# Patient Record
Sex: FEMALE | Race: WHITE
[De-identification: names, ages, dates, MRNs, and addresses within clinical notes are randomized per-mention and may not be internally consistent; named-entity substitution may affect disease eponyms.]

---

## 2018-03-28 ENCOUNTER — HOSPITAL ENCOUNTER (INPATIENT)
Dept: HOSPITAL 74 - JER | Age: 68
LOS: 8 days | Discharge: HOME HEALTH | DRG: 690 | End: 2018-04-05
Attending: INTERNAL MEDICINE | Admitting: INTERNAL MEDICINE
Payer: COMMERCIAL

## 2018-03-28 VITALS — BODY MASS INDEX: 21.9 KG/M2

## 2018-03-28 DIAGNOSIS — G30.9: ICD-10-CM

## 2018-03-28 DIAGNOSIS — E86.0: ICD-10-CM

## 2018-03-28 DIAGNOSIS — B95.1: ICD-10-CM

## 2018-03-28 DIAGNOSIS — Z87.891: ICD-10-CM

## 2018-03-28 DIAGNOSIS — Z85.72: ICD-10-CM

## 2018-03-28 DIAGNOSIS — E78.5: ICD-10-CM

## 2018-03-28 DIAGNOSIS — N17.9: ICD-10-CM

## 2018-03-28 DIAGNOSIS — N13.5: ICD-10-CM

## 2018-03-28 DIAGNOSIS — L02.224: ICD-10-CM

## 2018-03-28 DIAGNOSIS — F32.9: ICD-10-CM

## 2018-03-28 DIAGNOSIS — N39.0: Primary | ICD-10-CM

## 2018-03-28 DIAGNOSIS — R63.4: ICD-10-CM

## 2018-03-28 DIAGNOSIS — E11.9: ICD-10-CM

## 2018-03-28 DIAGNOSIS — I25.10: ICD-10-CM

## 2018-03-28 DIAGNOSIS — F02.80: ICD-10-CM

## 2018-03-28 DIAGNOSIS — G47.00: ICD-10-CM

## 2018-03-28 DIAGNOSIS — Z95.5: ICD-10-CM

## 2018-03-28 DIAGNOSIS — Z88.0: ICD-10-CM

## 2018-03-28 DIAGNOSIS — Q62.11: ICD-10-CM

## 2018-03-28 DIAGNOSIS — I10: ICD-10-CM

## 2018-03-28 DIAGNOSIS — R21: ICD-10-CM

## 2018-03-28 LAB
ALBUMIN SERPL-MCNC: 3.6 G/DL (ref 3.4–5)
ALP SERPL-CCNC: 99 U/L (ref 45–117)
ALT SERPL-CCNC: 19 U/L (ref 12–78)
ANION GAP SERPL CALC-SCNC: 12 MMOL/L (ref 8–16)
APPEARANCE UR: (no result)
AST SERPL-CCNC: 15 U/L (ref 15–37)
BACTERIA #/AREA URNS HPF: (no result) /HPF
BASOPHILS # BLD: 0.5 % (ref 0–2)
BILIRUB SERPL-MCNC: 0.5 MG/DL (ref 0.2–1)
BILIRUB UR STRIP.AUTO-MCNC: NEGATIVE MG/DL
BUN SERPL-MCNC: 16 MG/DL (ref 7–18)
CALCIUM SERPL-MCNC: 8.8 MG/DL (ref 8.5–10.1)
CHLORIDE SERPL-SCNC: 101 MMOL/L (ref 98–107)
CO2 SERPL-SCNC: 26 MMOL/L (ref 21–32)
COLOR UR: YELLOW
CREAT SERPL-MCNC: 0.8 MG/DL (ref 0.55–1.02)
DEPRECATED RDW RBC AUTO: 15.2 % (ref 11.6–15.6)
EOSINOPHIL # BLD: 1.9 % (ref 0–4.5)
EPITH CASTS URNS QL MICRO: (no result) /HPF
GLUCOSE SERPL-MCNC: 269 MG/DL (ref 74–106)
HCT VFR BLD CALC: 34.6 % (ref 32.4–45.2)
HGB BLD-MCNC: 11.7 GM/DL (ref 10.7–15.3)
KETONES UR QL STRIP: NEGATIVE
LEUKOCYTE ESTERASE UR QL STRIP.AUTO: (no result)
LYMPHOCYTES # BLD: 27.1 % (ref 8–40)
MCH RBC QN AUTO: 27.6 PG (ref 25.7–33.7)
MCHC RBC AUTO-ENTMCNC: 33.7 G/DL (ref 32–36)
MCV RBC: 82 FL (ref 80–96)
MONOCYTES # BLD AUTO: 7.9 % (ref 3.8–10.2)
MUCOUS THREADS URNS QL MICRO: (no result)
NEUTROPHILS # BLD: 62.6 % (ref 42.8–82.8)
NITRITE UR QL STRIP: POSITIVE
PH UR: 5 [PH] (ref 5–8)
PLATELET # BLD AUTO: 163 K/MM3 (ref 134–434)
PMV BLD: 8 FL (ref 7.5–11.1)
POTASSIUM SERPLBLD-SCNC: 4 MMOL/L (ref 3.5–5.1)
PROT SERPL-MCNC: 7.3 G/DL (ref 6.4–8.2)
PROT UR QL STRIP: (no result)
PROT UR QL STRIP: NEGATIVE
RBC # BLD AUTO: 4.22 M/MM3 (ref 3.6–5.2)
RBC # UR STRIP: (no result) /UL
SODIUM SERPL-SCNC: 139 MMOL/L (ref 136–145)
SP GR UR: 1.01 (ref 1–1.03)
UROBILINOGEN UR STRIP-MCNC: NEGATIVE MG/DL (ref 0.2–1)
WBC # BLD AUTO: 5.4 K/MM3 (ref 4–10)

## 2018-03-28 RX ADMIN — DONEPEZIL HYDROCHLORIDE SCH MG: 10 TABLET, FILM COATED ORAL at 21:46

## 2018-03-28 RX ADMIN — ATORVASTATIN CALCIUM SCH MG: 10 TABLET, FILM COATED ORAL at 23:07

## 2018-03-28 RX ADMIN — SODIUM CHLORIDE SCH MLS/HR: 9 INJECTION, SOLUTION INTRAVENOUS at 21:46

## 2018-03-28 RX ADMIN — AZTREONAM SCH MLS/HR: 1 INJECTION, POWDER, LYOPHILIZED, FOR SOLUTION INTRAMUSCULAR; INTRAVENOUS at 21:47

## 2018-03-28 RX ADMIN — NYSTATIN SCH APPLIC: 100000 POWDER TOPICAL at 21:47

## 2018-03-28 RX ADMIN — INSULIN ASPART SCH: 100 INJECTION, SOLUTION INTRAVENOUS; SUBCUTANEOUS at 23:20

## 2018-03-28 NOTE — PDOC
History of Present Illness





- General


History Source: Family, Old Records


Exam Limitations: Dementia





- History of Present Illness


Initial Comments: 





03/28/18 18:06


The patient is a 68 year old female presenting with her family, with a 

significant past medical history of DM, CAD s/p cardiac stents, hyperlipidemia, 

HTN (on metoprolol), alzhimers, non-hodgkins lymphoma s/p chemo 2013, s/p stent 

in L ureter 1/18, urinary incontinence, who presents to the emergency 

department complaining of persistent hematuria despite multiple outpatient oral 

antibiotics (Cipro and Avelox). The patient was sent for admission and IV 

antibiotics. Family notes that the patient had a low grade fever at 99.9 degree 

F, dry cough, rash on her groin (evaluated by PMD and prescribed steroid cream) 

and boil on her vagina.





Allergies: Penicillin 


Past surgical history: Kidney Stent placed 


Social History: No alcohol, tobacco or drug use reported 


PMD: Dr. Ziegler.


Urologist: Dr. Cervantes 








<Brant Sena - Last Filed: 03/28/18 18:06>





<Maria Esther Lam - Last Filed: 03/28/18 19:50>





- General


Chief Complaint: Hematuria


Stated Complaint: PCP SENT/HEMATURIA HAS BLADDER STENT


Time Seen by Provider: 03/28/18 14:35





Past History





<Brant Sena - Last Filed: 03/28/18 18:06>





- Past Medical History


Cancer: Yes (NON-HODGKINS)


COPD: No


Diabetes: Yes


Hypercholesterolemia: Yes


Other medical history: Alzheimer's disease





- Surgical History


Abdominal Surgery:  (Kidney stent)





- Suicide/Smoking/Psychosocial Hx


Smoking History: Never smoked


Have you smoked in the past 12 months: No


Number of Cigarettes Smoked Daily: 0


Information on smoking cessation initiated: No


Hx Alcohol Use: No


Drug/Substance Use Hx: No


Substance Use Type: None





<Maria Esther Lam - Last Filed: 03/28/18 19:50>





- Past Medical History


Allergies/Adverse Reactions: 


 Allergies











Allergy/AdvReac Type Severity Reaction Status Date / Time


 


Penicillins Allergy   Verified 03/28/18 14:39











Home Medications: 


Ambulatory Orders





Donepezil HCl [Aricept -] 5 mg PO DAILY 09/10/14 


Clopidogrel Bisulfate [Plavix -] 75 mg PO DAILY 09/25/14 


Metformin HCl 850 mg PO DAILY 09/25/14 


Metoprolol Tartrate [Lopressor -] 25 mg PO DAILY 09/25/14 











**Review of Systems





- Review of Systems


Able to Perform ROS?: No


Comments:: 





03/28/18 18:09


ROS unavailable due to patient medical condition. 





<Brant Sena - Last Filed: 03/28/18 18:06>





*Physical Exam





- Vital Signs


 Last Vital Signs











Temp Pulse Resp BP Pulse Ox


 


 98.9 F   76   19   118/70   99 


 


 03/28/18 14:35  03/28/18 14:35  03/28/18 14:35  03/28/18 14:35  03/28/18 14:35














- Physical Exam


Comments: 





03/28/18 18:10


GENERAL: Awake, alert, and fully oriented, in no acute distress


HEAD: No signs of trauma, normocephalic, atraumatic 


EYES: PERRLA, EOMI, sclera anicteric, conjunctiva clear


ENT: Auricles normal inspection, hearing grossly normal, nares patent, 

oropharynx clear without


exudates. Moist mucosa


NECK: Normal ROM, supple, no lymphadenopathy, JVD, or masses


LUNGS: No distress, speaks full sentences, clear to auscultation bilaterally 


HEART: Regular rate and rhythm, normal S1 and S2, no murmurs, rubs or gallops, 

peripheral pulses normal and equal bilaterally. 


ABDOMEN: Soft, nontender, normoactive bowel sounds.  No guarding, no rebound.  

No masses


EXTREMITIES : Normal inspection, Normal range of motion, no edema.  No clubbing 

or cyanosis. 


NEUROLOGICAL: Cranial nerves II through XII grossly intact.  Normal speech, 

normal gait, no focal sensorimotor deficits 


SKIN: Warm, Dry, normal turgor, no rashes or lesions noted








<Brant Sena - Last Filed: 03/28/18 18:06>





- Vital Signs


 Last Vital Signs











Temp Pulse Resp BP Pulse Ox


 


 98.9 F   76   19   118/70   99 


 


 03/28/18 14:35  03/28/18 14:35  03/28/18 14:35  03/28/18 14:35  03/28/18 14:35














<Maria Esther Lam - Last Filed: 03/28/18 19:50>





**Heart Score/ECG Review





- ECG Intrepretation


Comment:: 





03/28/18 19:27


sinus at 74, L axis, nl interval, t wave inversions avl, poor r wave progression





<Maria Esther Lam - Last Filed: 03/28/18 19:50>





ED Treatment Course





- LABORATORY


CBC & Chemistry Diagram: 


 03/28/18 14:45





 03/28/18 14:45





- ADDITIONAL ORDERS


Additional order review: 


 Laboratory  Results











  03/28/18





  14:45


 


Sodium  139


 


Potassium  4.0


 


Chloride  101


 


Carbon Dioxide  26


 


Anion Gap  12


 


BUN  16


 


Creatinine  0.8


 


Creat Clearance w eGFR  > 60


 


Random Glucose  269 H


 


Calcium  8.8


 


Total Bilirubin  0.5


 


AST  15


 


ALT  19


 


Alkaline Phosphatase  99


 


Total Protein  7.3


 


Albumin  3.6








 











  03/28/18





  14:45


 


RBC  4.22


 


MCV  82.0


 


MCHC  33.7


 


RDW  15.2  D


 


MPV  8.0


 


Neutrophils %  62.6


 


Lymphocytes %  27.1  D


 


Monocytes %  7.9


 


Eosinophils %  1.9  D


 


Basophils %  0.5














<Brant Sena - Last Filed: 03/28/18 18:06>





- LABORATORY


CBC & Chemistry Diagram: 


 03/28/18 14:45





 03/28/18 14:45





- ADDITIONAL ORDERS


Additional order review: 


 Laboratory  Results











  03/28/18





  14:45


 


Sodium  139


 


Potassium  4.0


 


Chloride  101


 


Carbon Dioxide  26


 


Anion Gap  12


 


BUN  16


 


Creatinine  0.8


 


Creat Clearance w eGFR  > 60


 


Random Glucose  269 H


 


Calcium  8.8


 


Total Bilirubin  0.5


 


AST  15


 


ALT  19


 


Alkaline Phosphatase  99


 


Total Protein  7.3


 


Albumin  3.6








 











  03/28/18





  14:45


 


RBC  4.22


 


MCV  82.0


 


MCHC  33.7


 


RDW  15.2  D


 


MPV  8.0


 


Neutrophils %  62.6


 


Lymphocytes %  27.1  D


 


Monocytes %  7.9


 


Eosinophils %  1.9  D


 


Basophils %  0.5














<Maria Esther Lam - Last Filed: 03/28/18 19:50>





Medical Decision Making





- Medical Decision Making





03/28/18 17:41


a/p: 69yo pleasantly demented female presents to the ED with her family for 

eval of persistent hematuria/uti that has failed 2 outpt abx treatments


-has been seen by DR. Reuben Younger - finished avelox on friday


-also with cough since last night


-no fevers


-will check labs, ua, ucx, ekg, cxr


-sent by PMD for admission


-Dr. Baer is urology


-will discuss with ID and Urology pending labs


-has a stent to ureter


-hematuria since thursday again after stopping hematuria for a week while on 

avelox





03/28/18 19:34


pt with yeast infection to groin area


has been treating with topical nystatin


will give fluconazole





03/28/18 19:35


pt with UTI on labs


has been on outpt abx x 2


will discuss with ID and UROLOGY


will be admitted to [facility] for IV abx


case discussed with Dr. Meza who accepts pt to service


03/28/18 19:49


case discussed with Dr. Blackmon who recommends Merrem therapy IV for the UTI





<Maria Esther Lam - Last Filed: 03/28/18 19:50>





*DC/Admit/Observation/Transfer





- Attestations


Scribe Attestion: 





03/28/18 18:10


Documentation prepared by Brant Sena, acting as medical scribe for 

Maria Esther Lam DO





<Brant Sena - Last Filed: 03/28/18 18:06>





- Discharge Dispostion


Admit: Yes





- Attestations


Physician Attestion: 





03/28/18 19:28








I, Dr. Maria Esther Lam DO, attest that this document has been prepared under 

my direction and personally reviewed by me in its entirety.   I further attest, 

that it accurately reflects all work, treatment, procedures and medical decision

-making performed by me.  





<Maria Esther Lam - Last Filed: 03/28/18 19:50>


Diagnosis at time of Disposition: 


 UTI (urinary tract infection)








- Discharge Dispostion


Condition at time of disposition: Fair





- Referrals


Referrals: 


Precious Ziegler [Primary Care Provider] - 





- Patient Instructions





- Post Discharge Activity

## 2018-03-28 NOTE — PDOC
Rapid Medical Evaluation


Chief Complaint: Hematuria


Time Seen by Provider: 03/28/18 14:35


Medical Evaluation: 


 Allergies











Allergy/AdvReac Type Severity Reaction Status Date / Time


 


Penicillins Allergy   Verified 09/25/14 23:05











03/28/18 14:35


I have performed a brief in-person evaluation of this patient.





The patient presents with a chief complaint of: persistent hematuria despite 

multiple abx. Sent in for admission for IV abx.  is Dr Quick. PMD is Dr Ziegler. H/o DM, CAD, Alzheimer, [place] lymphoma, s/p chemo 2013, s/p stent in L 

ureter 1/18, urinary incontinence





Pertinent physical exam findings:stable





I have ordered the following:labs





The patient will proceed to the ED for further evaluation.

## 2018-03-28 NOTE — PN
Teaching Attending Note


Name of Resident: Twila Lay





ATTENDING PHYSICIAN STATEMENT





I saw and evaluated the patient.


I reviewed the resident's note and discussed the case with the resident.


I agree with the resident's findings and plan as documented.








SUBJECTIVE:


67 yo F with pmhx. of DM, CAD s/p stents, hld, htn (on Metoprolol), htn, 

Alzheimers, Non-Hodgkins Lymphoma (s/p chemo 13'), L. Ureteral stent 1/18, 

urinary incontinence, who presents with hematouria. Family states she was on 

Cipro/Moxi prior. Pt. is non-verbal at baseline. As per family she has a groin 

rash and boil on labia. 





OBJECTIVE:


Physical:


Vs:


 Vital Signs











 Period  Temp  Pulse  Resp  BP Sys/Silver  Pulse Ox


 


 Last 24 Hr  98.9 F  76  19  118/70  99








GEN: NAD, Resting in bed, AA0X0


HEENT: NCAT, PERRL, Throat without erythema or exudates


CARD: RRR S1, S2


RESP: CTAB


ABD: Bsx4, NTD to palpation


EXT: - C/C/E


: Groin with erythmatous rash, boil difficult to examine, due to positioning.





 CBCD











WBC  5.4 K/mm3 (4.0-10.0)  D 03/28/18  14:45    


 


RBC  4.22 M/mm3 (3.60-5.2)   03/28/18  14:45    


 


Hgb  11.7 GM/dL (10.7-15.3)   03/28/18  14:45    


 


Hct  34.6 % (32.4-45.2)   03/28/18  14:45    


 


MCV  82.0 fl (80-96)   03/28/18  14:45    


 


MCHC  33.7 g/dl (32.0-36.0)   03/28/18  14:45    


 


RDW  15.2 % (11.6-15.6)  D 03/28/18  14:45    


 


Plt Count  163 K/MM3 (134-434)   03/28/18  14:45    


 


MPV  8.0 fl (7.5-11.1)   03/28/18  14:45    








 CMP











Sodium  139 mmol/L (136-145)   03/28/18  14:45    


 


Potassium  4.0 mmol/L (3.5-5.1)   03/28/18  14:45    


 


Chloride  101 mmol/L ()   03/28/18  14:45    


 


Carbon Dioxide  26 mmol/L (21-32)   03/28/18  14:45    


 


Anion Gap  12  (8-16)   03/28/18  14:45    


 


BUN  16 mg/dL (7-18)   03/28/18  14:45    


 


Creatinine  0.8 mg/dL (0.55-1.02)   03/28/18  14:45    


 


Creat Clearance w eGFR  > 60  (>60)   03/28/18  14:45    


 


Random Glucose  269 mg/dL ()  H  03/28/18  14:45    


 


Calcium  8.8 mg/dL (8.5-10.1)   03/28/18  14:45    


 


Total Bilirubin  0.5 mg/dL (0.2-1.0)   03/28/18  14:45    


 


AST  15 U/L (15-37)   03/28/18  14:45    


 


ALT  19 U/L (12-78)   03/28/18  14:45    


 


Alkaline Phosphatase  99 U/L ()   03/28/18  14:45    


 


Total Protein  7.3 g/dl (6.4-8.2)   03/28/18  14:45    


 


Albumin  3.6 g/dl (3.4-5.0)   03/28/18  14:45    











 Urine Test Results











Urine Color  Yellow   03/28/18  18:20    


 


Urine Appearance  Turbid   03/28/18  18:20    


 


Urine pH  5.0  (5.0-8.0)   03/28/18  18:20    


 


Ur Specific Gravity  1.011  (1.001-1.035)   03/28/18  18:20    


 


Urine Protein  2+  (NEGATIVE)  H  03/28/18  18:20    


 


Urine Glucose (UA)  Negative  (NEGATIVE)   03/28/18  18:20    


 


Urine Ketones  Negative  (NEGATIVE)   03/28/18  18:20    


 


Urine Blood  3+  (NEGATIVE)  H  03/28/18  18:20    


 


Urine Nitrite  Positive  (NEGATIVE)   03/28/18  18:20    


 


Urine Bilirubin  Negative  (<2.0 mg/dL)   03/28/18  18:20    


 


Ur Leukocyte Esterase  3+  (NEGATIVE)  H  03/28/18  18:20    


 


Ur Epithelial Cells  Rare /HPF (FEW)   03/28/18  18:20    


 


Urine Bacteria  Few /hpf (NONE SEEN)   03/28/18  18:20    


 


Urine Mucus  Rare   03/28/18  18:20    





Ambulatory Orders





Donepezil HCl [Aricept -] 5 mg PO DAILY 09/10/14 


Clopidogrel Bisulfate [Plavix -] 75 mg PO DAILY 09/25/14 


Metformin HCl 850 mg PO DAILY 09/25/14 


Metoprolol Tartrate [Lopressor -] 25 mg PO DAILY 09/25/14 














ASSESSMENT AND PLAN:


67 yo F with pmhx. of DM, CAD s/p stents, hld, htn (on Metoprolol), htn, 

Alzheimers, Non-Hodgkins Lymphoma (s/p chemo 13'), L. Ureteral stent 1/18, 

urinary incontinence, who presents with hematouria, being admitted for UTI, not 

responsing to abx.





1.) UTI


- UCx


- IVF gentle


- ID consult


- Aztreonam





2.) Rash- Candida/boil


- Monitor boil- if draining or worsens gyn


- Nystatin powder





3.) L. Utretral stent


- US


- Gu Consult





3.)  DM


- FS


- RAISS


- Hold Metformin





4.) CAD s/p Stents


- Hold Plavix if procedure tomorrow





5.) Dementia


- C/W Aricept





6.) Dvt Ppx


- Scds





Place in Meds sx

## 2018-03-28 NOTE — HP
CHIEF COMPLAINT: worsening hematuria x 1 week 





PCP: Dr. Benavides





HISTORY OF PRESENT ILLNESS:





69 y/o F with PMH HTN, HLD, DM, CAD s/p stent, Alzheimer's dz, NHL s/p chemo (

2013; currently on no agents), recent sepsis 2/2 blocked L ureteral stent (

[facility]), s/p L ureteral stent placement (1/2018), urinary 

incontinence, who presents to the ED c/o worsening hematuria over the past 

week. As per daughter, in January, pt had a L ureteral stent placed. It was 

supposed to be removed in February. Ever since its placement, pt has had 

intermittent hematuria. She saw her urologist, Dr. Baer and was 

subsequently found to have a positive urine cx, and was placed on PO cipro (1 

wk course), and moxi (3 wk course) by Dr. Blackmon (ID). However, pt's hematuria 

continued, with its frequency increasing to daily this wk. Pt also with vague 

abdominal discomfort during urination during this time, as well as groin rash (

over the past two weeks), and labial boil (over the past 3 days). She has also 

had a productive cough, low grade temp (99.8F Tmax), rigors, and 15 pound 

weight loss over the last three months. Daughter denies appetite changes, night 

sweats, HA, SOB, or changes in bowel function. 





Pt is taken care of at home by family members. She does not have a health aid. 





ER course was notable for:


(1) Meropenem IVPB 1 g x 1 


(2) Diflucan 150mg x 1 


(3) UA 3+ leuk esterase, 2203 WBCs


(4) 





Recent Travel: none 





PAST MEDICAL HISTORY: as above 





PAST SURGICAL HISTORY:  s/p L ureteral stent placement (1/2018), CAD s/p stent, 

lung bx (NHL went to lung), breast bx 





Social History: retired; lives at home with family. Used to work for [employer]

, past [place] 


Smoking: started at age 13.  smoked for 40-50 yrs, 1-1.5 ppd. stopped 1 yr ago 


Alcohol: socially in past 


Drugs: denies





Family History: father- leukemia, lung CA- sister, brothers- DM, mother- stroke 





Allergies





Penicillins Allergy (Verified 03/28/18 14:39) - rash, hives 


 








HOME MEDICATIONS:


 Home Medications











 Medication  Instructions  Recorded


 


Donepezil HCl [Aricept -] 5 mg PO DAILY 09/10/14


 


Clopidogrel Bisulfate [Plavix -] 75 mg PO DAILY 09/25/14


 


Metformin HCl 850 mg PO DAILY 09/25/14


 


Metoprolol Tartrate [Lopressor -] 25 mg PO DAILY 09/25/14


 


Pravastatin Sodium 10 mg PO DAILY 03/28/18


 


Trazodone HCl DAILY 03/28/18








REVIEW OF SYSTEMS


CONSTITUTIONAL: +weight change


Absent:  fever, chills, diaphoresis, generalized weakness, malaise, loss of 

appetite


HEENT: 


Absent:  rhinorrhea, nasal congestion, throat pain, throat swelling, difficulty 

swallowing, mouth swelling, ear pain, eye pain, visual changes


CARDIOVASCULAR: 


Absent: chest pain, syncope, palpitations, irregular heart rate, lightheadedness

, peripheral edema


RESPIRATORY: 


Absent: cough, shortness of breath, dyspnea with exertion, orthopnea, wheezing, 

stridor, hemoptysis


GASTROINTESTINAL:


Absent: abdominal pain, abdominal distension, nausea, vomiting, diarrhea, 

constipation, melena, hematochezia


GENITOURINARY: +hematuria 


Absent: dysuria, frequency, urgency, hesitancy, flank pain, genital pain


MUSCULOSKELETAL: 


Absent: myalgia, arthralgia, joint swelling, back pain, neck pain


SKIN: +groin rash 


Absent: itching, pallor


HEMATOLOGIC/IMMUNOLOGIC: 


Absent: easy bleeding, easy bruising, lymphadenopathy, frequent infections


ENDOCRINE:


Absent: unexplained weight gain, unexplained weight loss, heat intolerance, 

cold intolerance


NEUROLOGIC: +dementia, urinary incontinence 


Absent: headache, focal weakness or paresthesias, dizziness, unsteady gait, 

seizure, mental status changes, bladder or bowel incontinence


PSYCHIATRIC: 


Absent: anxiety, depression, suicidal or homicidal ideation, hallucinations.








PHYSICAL EXAMINATION


 Vital Signs











  03/28/18





  14:35


 


Temperature 98.9 F


 


Pulse Rate 76


 


Respiratory 19





Rate 


 


Blood Pressure 118/70


 


O2 Sat by Pulse 99





Oximetry (%) 











GENERAL: Sitting comfortably. With dementia, mostly non-verbal. AAO x 0. Cries 

out, however in no acute distress.


HEAD: Normal with no signs of trauma.


EYES: Pupils equal, round and reactive to light, extraocular movements intact, 

sclera anicteric, conjunctiva clear. 


EARS, NOSE, THROAT: Ears normal, nares patent, oropharynx clear without 

exudates. Moist mucous membranes.


NECK: Normal range of motion, supple 


LUNGS: Breath sounds equal. No accessory muscle use. Unable to adequately 

assess as pt with poor inspiratory effort, or response to commands 


HEART: Regular rate and rhythm, normal S1 and S2 without murmur, rub or gallop.


ABDOMEN: Soft, +mild TTP suprapubic area, not distended, normoactive bowel 

sounds, no guarding, no rebound, no masses.  


LOWER EXTREMITIES: 2+ posterior tibial pulses, warm, well-perfused. No calf 

tenderness. No peripheral edema. 


NEUROLOGICAL:  Cranial nerves II-XII appear to be grossly intact. Unable to 

adequately respond to commands 


PSYCHIATRIC: with dementia 


 Laboratory Results - last 24 hr











  03/28/18 03/28/18 03/28/18





  14:45 14:45 18:20


 


WBC  5.4  D  


 


RBC  4.22  


 


Hgb  11.7  


 


Hct  34.6  


 


MCV  82.0  


 


MCH  27.6  D  


 


MCHC  33.7  


 


RDW  15.2  D  


 


Plt Count  163  


 


MPV  8.0  


 


Neutrophils %  62.6  


 


Lymphocytes %  27.1  D  


 


Monocytes %  7.9  


 


Eosinophils %  1.9  D  


 


Basophils %  0.5  


 


Sodium   139 


 


Potassium   4.0 


 


Chloride   101 


 


Carbon Dioxide   26 


 


Anion Gap   12 


 


BUN   16 


 


Creatinine   0.8 


 


Creat Clearance w eGFR   > 60 


 


Random Glucose   269 H 


 


Calcium   8.8 


 


Total Bilirubin   0.5 


 


AST   15 


 


ALT   19 


 


Alkaline Phosphatase   99 


 


Total Protein   7.3 


 


Albumin   3.6 


 


Urine Color    Yellow


 


Urine Appearance    Turbid


 


Urine pH    5.0


 


Ur Specific [place]    1.011


 


Urine Protein    2+ H


 


Urine Glucose (UA)    Negative


 


Urine Ketones    Negative


 


Urine Blood    3+ H


 


Urine Nitrite    Positive


 


Urine Bilirubin    Negative


 


Urine Urobilinogen    Negative


 


Ur Leukocyte Esterase    3+ H


 


Urine WBC (Auto)    2203


 


Urine RBC (Auto)    734


 


Ur Epithelial Cells    Rare


 


Urine Bacteria    Few


 


Urine Mucus    Rare





TESTS





MICRO: Ucx - pending





EKG: NSR, L axis deviation, TWI avL, poor R wave progression. Qtc 463ms





CXR: without infiltrates, no pleural effusion 





ASSESSMENT/PLAN:





69 y/o F with PMH HTN, HLD, DM, CAD s/p stent, Alzheimer's dz, NHL s/p chemo (

2013; currently on no agents), recent sepsis 2/2 blocked L ureteral stent (

[facility]), s/p L ureteral stent placement (1/2018), urinary 

incontinence, who presents to the ED c/o worsening hematuria over the past 

week. Pt admitted to med-surg for resistant UTI.





#Resistant UTI, failed oral abx


-Without sepsis criteria in ED, afebrile, without white count. However low 

grade temps at home


-pt immunocompromised - with NHL, DM. increased risk infection


-UA: 3+ blood, +nitrite, 3+ leuk esterase, 2203 WBCs


-Increased frequency hematuria


-Received meropenem IVPB 1g x 1 in ED


-Will start on Azactam 1g IVPB q8h tomorrow. Pen allergic-rash


-ID consult-Dr. Bates


-Urology consult- Dr. Quick


-gentle IVF


-F/u blood cx, urine cx 





#Hx L ureteral stent


-Uro consult- Dr. Quick


-F/u renal sono- assess stent placement. 


As per ED staff, pt agitated with renal sono





#Yeast infection in groin area


-Received diflucan 150mg x 1 in ED 


-Continue nystatin powder use daily





#R labial boil


-Recommend gyn f/u if does not heal or abnormal d/c





#Hx 15 lb weight loss 


-without B sx, however sig weight loss


-15 lbs over last 3 months


-Recommend outpatient f/u


-Also has smoking hx


-?r/o bladder malignancy





#CAD s/p stent


-Hold plavix 75mg PO qd in case of procedure





#HTN- controlled


-Continue metoprolol succinate 50mg qd





#HLD


-Continue pravastatin 10mg PO qHS





#DM


-will hold home metformin


-ISS ACHS


-BGM





#Alzheimer's dz


-Continue aricept 10mg PO HS





#Insomnia


-Continue ambien 5mg PO qHS PRN





#Depression/pseudodementia


-Will hold Trazodone 50 PO qd


-Qtc 463ms borderline elevated


-Continue to monitor 





#F/E/N


-gentle fluids IV NS 75 cc/hr


-Continue to monitor electrolytes


-NPO until speech&swallow- avoid aspiration d/t dementia





#PPX


DVT: SCD's, will avoid a/c d/t hematuria


GI: on protonix at home, will continue





#Dispo


Monitoring on med-surg 























Visit type





- Emergency Visit


Emergency Visit: Yes


ED Registration Date: 03/28/18


Care time: The patient presented to the Emergency Department on the above date 

and was hospitalized for further evaluation of their emergent condition.





- New Patient


This patient is new to me today: Yes


Date on this admission: 03/29/18





- Critical Care


Critical Care patient: No





Hospitalist Screening





- Colonoscopy Questionnaire


Colonoscopy Questionnaire: 





Colonoscopy Questionnaire








-   Patient:


50 - 75 years old and never had a screening colonoscopy: Unknown


History of colon or rectal polyps, or CA: Unknown


History of IBD, Crohn's disease or UC: Unknown


History of abdominal radiation therapy as a child: Unknown





-   Relative:


1 with colon or rectal CA, or polyps at age 60 or younger: Unknown


Colon or rectal CA diagnosed at age 45 or younger: Unknown


Multiple relatives with colon or rectal CA: Unknown





-   Outcome:


Screening Result: Negative Screen

## 2018-03-29 LAB
ANION GAP SERPL CALC-SCNC: 14 MMOL/L (ref 8–16)
BASOPHILS # BLD: 0.5 % (ref 0–2)
BUN SERPL-MCNC: 13 MG/DL (ref 7–18)
CALCIUM SERPL-MCNC: 8.1 MG/DL (ref 8.5–10.1)
CHLORIDE SERPL-SCNC: 99 MMOL/L (ref 98–107)
CO2 SERPL-SCNC: 25 MMOL/L (ref 21–32)
CREAT SERPL-MCNC: 0.7 MG/DL (ref 0.55–1.02)
DEPRECATED RDW RBC AUTO: 15.3 % (ref 11.6–15.6)
EOSINOPHIL # BLD: 1.2 % (ref 0–4.5)
GLUCOSE SERPL-MCNC: 144 MG/DL (ref 74–106)
HCT VFR BLD CALC: 33.2 % (ref 32.4–45.2)
HGB BLD-MCNC: 11.4 GM/DL (ref 10.7–15.3)
LYMPHOCYTES # BLD: 20.5 % (ref 8–40)
MAGNESIUM SERPL-MCNC: 1.8 MG/DL (ref 1.8–2.4)
MCH RBC QN AUTO: 27.8 PG (ref 25.7–33.7)
MCHC RBC AUTO-ENTMCNC: 34.3 G/DL (ref 32–36)
MCV RBC: 80.9 FL (ref 80–96)
MONOCYTES # BLD AUTO: 7.9 % (ref 3.8–10.2)
NEUTROPHILS # BLD: 69.9 % (ref 42.8–82.8)
PHOSPHATE SERPL-MCNC: 3 MG/DL (ref 2.5–4.9)
PLATELET # BLD AUTO: 158 K/MM3 (ref 134–434)
PMV BLD: 8.4 FL (ref 7.5–11.1)
POTASSIUM SERPLBLD-SCNC: 3.6 MMOL/L (ref 3.5–5.1)
RBC # BLD AUTO: 4.11 M/MM3 (ref 3.6–5.2)
SODIUM SERPL-SCNC: 138 MMOL/L (ref 136–145)
WBC # BLD AUTO: 7.6 K/MM3 (ref 4–10)

## 2018-03-29 RX ADMIN — AZTREONAM SCH MLS/HR: 1 INJECTION, POWDER, LYOPHILIZED, FOR SOLUTION INTRAMUSCULAR; INTRAVENOUS at 05:09

## 2018-03-29 RX ADMIN — NYSTATIN SCH APPLIC: 100000 POWDER TOPICAL at 12:38

## 2018-03-29 RX ADMIN — PANTOPRAZOLE SODIUM SCH MG: 40 INJECTION, POWDER, FOR SOLUTION INTRAVENOUS at 12:37

## 2018-03-29 RX ADMIN — CLINDAMYCIN PHOSPHATE SCH MLS/HR: 600 INJECTION, SOLUTION INTRAVENOUS at 18:28

## 2018-03-29 RX ADMIN — SODIUM CHLORIDE SCH MLS/HR: 9 INJECTION, SOLUTION INTRAVENOUS at 21:59

## 2018-03-29 RX ADMIN — ATORVASTATIN CALCIUM SCH MG: 10 TABLET, FILM COATED ORAL at 21:59

## 2018-03-29 RX ADMIN — INSULIN ASPART SCH UNITS: 100 INJECTION, SOLUTION INTRAVENOUS; SUBCUTANEOUS at 18:28

## 2018-03-29 RX ADMIN — INSULIN ASPART SCH UNITS: 100 INJECTION, SOLUTION INTRAVENOUS; SUBCUTANEOUS at 21:59

## 2018-03-29 RX ADMIN — SODIUM CHLORIDE SCH MLS/HR: 9 INJECTION, SOLUTION INTRAVENOUS at 08:41

## 2018-03-29 RX ADMIN — CLINDAMYCIN PHOSPHATE SCH MLS/HR: 600 INJECTION, SOLUTION INTRAVENOUS at 12:37

## 2018-03-29 RX ADMIN — INSULIN ASPART SCH: 100 INJECTION, SOLUTION INTRAVENOUS; SUBCUTANEOUS at 12:20

## 2018-03-29 RX ADMIN — INSULIN ASPART SCH: 100 INJECTION, SOLUTION INTRAVENOUS; SUBCUTANEOUS at 08:41

## 2018-03-29 RX ADMIN — DONEPEZIL HYDROCHLORIDE SCH: 10 TABLET, FILM COATED ORAL at 15:44

## 2018-03-29 RX ADMIN — AZTREONAM SCH: 1 INJECTION, POWDER, LYOPHILIZED, FOR SOLUTION INTRAMUSCULAR; INTRAVENOUS at 20:01

## 2018-03-29 RX ADMIN — SODIUM CHLORIDE SCH: 9 INJECTION, SOLUTION INTRAVENOUS at 21:52

## 2018-03-29 NOTE — CONSULT
Admitting History and Physical





- Primary Care Physician


PCP: Johny Sethi





- Admission


History of Present Illness: 





67 yo F with pmhx. of DM, CAD s/p stents, hld, htn (on Metoprolol), htn, 

Alzheimers, Non-Hodgkins Lymphoma (s/p chemo 13'), L. Ureteral stent 1/18, 

urinary incontinence, who presents with hematouria. Family states she was on 

Cipro/Moxi prior. Pt. is mostly non-verbal at baseline. She speaks occasionally.








Pt is on a cut up, regular diet and thin liquid without difficulty.


History Source: Family Member, Medical Record


Limitations to Obtaining History: Clinical Condition, Dementia





- Past Medical History


Cardiovascular: Yes: HTN, Hyperlipdemia


Heme/Onc: Yes: Other (Non-hodgkins lymphoma s/p chemo last 3/2014)





- Smoking History


Smoking history: Never smoked


Have you smoked in the past 12 months: No


Aproximately how many cigarettes per day: 0





- Alcohol/Substance Use


Hx Alcohol Use: No


History of Substance Use: reports: None





- Social History


ADL: Independent


History of Recent Travel: No





History





- Admission


Reason For Visit: UTI





- Diagnostics


X-ray: Report Reviewed





- General


Mental Status: Awake and Alert, Flat Affect


Attention: Distractible, Moderate Impairment


Ability to Follow Directions: Poor





Speech Evaluation





- Communication


Primary Language: Chilean


Secondary Language: ENGLISH


Communication: Yes: Simple Responses (rare verbalizations "hello")





- Speech Production


Able to Make Needs Known: Yes: Severely Impaired


Intelligibility: Yes: WNL





- Speech Characteristics


Voice Loudness: Normal


Voice Pitch: Yes: Normal


Voice Phonatory-based Quality: Yes: Normal


Speech Clarity: < 100%


Nasal Resonance: Normal


Articulation: Yes: Precise





- Language/Auditory Comprehension


Observation: Able to respond to yes/no queries: No





- Language/Verbal Expression


Able to Respond to Simple Queries: Yes: Severely Impaired


Able to Communicate Wants and Needs: Yes: Severely Impaired


Functional Communication Status: Yes: Severely Impaired





- Memory/Perception


Long term Memory: Yes: Severely Impaired


Short Term Memory: Yes: Severely Impaired





- Swallow Evaluation/Bedside Assessment


Current Nutritional Intake: NPO


Oral Secretions: Yes: WFL


Dentition: Yes: Dental Appliance Upper, Dental Appliance Lower


Facial Symmetry at Rest: Facial Droop Left


Facial Symmetry on Retraction: Symmetrical


Lingual Movement: Symmetric


Lingual Speed of Movement: Normal


Laryngeal Movement: Able to Palpate


Labial Seal: WFL


Chewing: WFL


Oral Prep Time: WFL


A-P Transit: WFL


Pocketing: None


Timing of Swallow: WFL


Coughing/Throat Clear: No


Change in Voice: No





Recommendations





- Speech Evaluation, Impression/Plan


Impression: Rare verbalizations with normal intelligibility. Profound cognitive 

deficits. Swallowing overtly intact.





- Disposition


Discharge to: Home with Assist





- Dysphagia Impressions/Plan


Swallowing Skills: WFL


*Silent aspiration: cannot be R/O at bedside





- Recommendations


Diet Consistency: Regular (cut up by family)


Liquids: Thin Liquids

## 2018-03-29 NOTE — PN
<Grabiel Sung - Last Filed: 03/29/18 16:13>


Physical Exam: 


SUBJECTIVE: Patient seen and examined


No acute events overnight. Patient nonverbal.   





OBJECTIVE:





 Vital Signs











 Period  Temp  Pulse  Resp  BP Sys/Silver  Pulse Ox


 


 Last 24 Hr  98.2 F-98.9 F  72-79  18-19  118-169/70-95  96-99











GENERAL: Sitting comfortably. Non-verbal. AAO x 0. Cries out, however in no 

acute distress.


HEAD: Normal with no signs of trauma.


EYES: Pupils equal, round and reactive to light, extraocular movements intact, 

sclera anicteric, conjunctiva clear. 


EARS, NOSE, THROAT: Oropharynx clear without exudates. Dry mucous membranes


NECK: Normal range of motion, supple 


LUNGS: Breath sounds equal. No accessory muscle use. Unable to adequately 

assess as pt with poor inspiratory effort, or response to commands 


HEART: Regular rate and rhythm, normal S1 and S2 without murmur, rub or gallop.


ABDOMEN: Soft, nontender, not distended, normoactive bowel sounds, no guarding, 

no rebound, no masses.  


LOWER EXTREMITIES: 2+ posterior tibial pulses, warm, well-perfused. No calf 

tenderness. No peripheral edema. 


NEUROLOGICAL:  Unable to adequately respond to commands 


PSYCHIATRIC: with dementia 














 Laboratory Results - last 24 hr











  03/28/18 03/28/18 03/28/18





  14:45 14:45 18:20


 


WBC  5.4  D  


 


RBC  4.22  


 


Hgb  11.7  


 


Hct  34.6  


 


MCV  82.0  


 


MCH  27.6  D  


 


MCHC  33.7  


 


RDW  15.2  D  


 


Plt Count  163  


 


MPV  8.0  


 


Neutrophils %  62.6  


 


Lymphocytes %  27.1  D  


 


Monocytes %  7.9  


 


Eosinophils %  1.9  D  


 


Basophils %  0.5  


 


Sodium   139 


 


Potassium   4.0 


 


Chloride   101 


 


Carbon Dioxide   26 


 


Anion Gap   12 


 


BUN   16 


 


Creatinine   0.8 


 


Creat Clearance w eGFR   > 60 


 


POC Glucometer   


 


Random Glucose   269 H 


 


Calcium   8.8 


 


Phosphorus   


 


Magnesium   


 


Total Bilirubin   0.5 


 


AST   15 


 


ALT   19 


 


Alkaline Phosphatase   99 


 


Total Protein   7.3 


 


Albumin   3.6 


 


Urine Color    Yellow


 


Urine Appearance    Turbid


 


Urine pH    5.0


 


Ur Specific [place]    1.011


 


Urine Protein    2+ H


 


Urine Glucose (UA)    Negative


 


Urine Ketones    Negative


 


Urine Blood    3+ H


 


Urine Nitrite    Positive


 


Urine Bilirubin    Negative


 


Urine Urobilinogen    Negative


 


Ur Leukocyte Esterase    3+ H


 


Urine WBC (Auto)    2203


 


Urine RBC (Auto)    734


 


Ur Epithelial Cells    Rare


 


Urine Bacteria    Few


 


Urine Mucus    Rare














  03/28/18 03/29/18 03/29/18





  23:15 07:50 07:50


 


WBC   7.6  D 


 


RBC   4.11 


 


Hgb   11.4 


 


Hct   33.2 


 


MCV   80.9 


 


MCH   27.8 


 


MCHC   34.3 


 


RDW   15.3 


 


Plt Count   158 


 


MPV   8.4 


 


Neutrophils %   69.9 


 


Lymphocytes %   20.5  D 


 


Monocytes %   7.9 


 


Eosinophils %   1.2 


 


Basophils %   0.5 


 


Sodium    138


 


Potassium    3.6


 


Chloride    99


 


Carbon Dioxide    25


 


Anion Gap    14


 


BUN    13


 


Creatinine    0.7


 


Creat Clearance w eGFR   


 


POC Glucometer  120.99108  


 


Random Glucose    144 H


 


Calcium    8.1 L


 


Phosphorus    3.0


 


Magnesium    1.8


 


Total Bilirubin   


 


AST   


 


ALT   


 


Alkaline Phosphatase   


 


Total Protein   


 


Albumin   


 


Urine Color   


 


Urine Appearance   


 


Urine pH   


 


Ur Specific [place]   


 


Urine Protein   


 


Urine Glucose (UA)   


 


Urine Ketones   


 


Urine Blood   


 


Urine Nitrite   


 


Urine Bilirubin   


 


Urine Urobilinogen   


 


Ur Leukocyte Esterase   


 


Urine WBC (Auto)   


 


Urine RBC (Auto)   


 


Ur Epithelial Cells   


 


Urine Bacteria   


 


Urine Mucus   














  03/29/18





  08:37


 


WBC 


 


RBC 


 


Hgb 


 


Hct 


 


MCV 


 


MCH 


 


MCHC 


 


RDW 


 


Plt Count 


 


MPV 


 


Neutrophils % 


 


Lymphocytes % 


 


Monocytes % 


 


Eosinophils % 


 


Basophils % 


 


Sodium 


 


Potassium 


 


Chloride 


 


Carbon Dioxide 


 


Anion Gap 


 


BUN 


 


Creatinine 


 


Creat Clearance w eGFR 


 


POC Glucometer  156


 


Random Glucose 


 


Calcium 


 


Phosphorus 


 


Magnesium 


 


Total Bilirubin 


 


AST 


 


ALT 


 


Alkaline Phosphatase 


 


Total Protein 


 


Albumin 


 


Urine Color 


 


Urine Appearance 


 


Urine pH 


 


Ur Specific [place] 


 


Urine Protein 


 


Urine Glucose (UA) 


 


Urine Ketones 


 


Urine Blood 


 


Urine Nitrite 


 


Urine Bilirubin 


 


Urine Urobilinogen 


 


Ur Leukocyte Esterase 


 


Urine WBC (Auto) 


 


Urine RBC (Auto) 


 


Ur Epithelial Cells 


 


Urine Bacteria 


 


Urine Mucus 








Active Medications











Generic Name Dose Route Start Last Admin





  Trade Name Nicolle  PRN Reason Stop Dose Admin


 


Atorvastatin Calcium  10 mg  03/28/18 22:00  03/28/18 23:07





  Lipitor -  PO   10 mg





  HS FESTUS   Administration


 


Donepezil HCl  10 mg  03/28/18 20:30  03/28/18 21:46





  Aricept -  PO   10 mg





  DAILY FESTUS   Administration


 


Sodium Chloride  1,000 mls @ 75 mls/hr  03/28/18 20:30  03/29/18 08:41





  Normal Saline -  IV   75 mls/hr





  ASDIR FESTUS   Administration


 


Aztreonam 1 gm/ Dextrose  50 mls @ 100 mls/hr  03/28/18 20:30  





  IVPB   





  Q8H FESTUS   





  Protocol   


 


Clindamycin Phosphate  600 mg in 50 mls @ 100 mls/hr  03/29/18 11:00  





  Cleocin 600 Mg Premix Ivpb -  IVPB   





  Q8H-IV FESTUS   


 


Insulin Aspart  1 vial  03/28/18 22:00  03/29/18 08:41





  Novolog Vial Sliding Scale -  SQ   Not Given





  ACHS FESTUS   





  Protocol   


 


Metoprolol Succinate  50 mg  03/28/18 20:45  03/28/18 21:46





  Toprol Xl -  PO   50 mg





  DAILY FESTUS   Administration


 


Nystatin  1 applic  03/28/18 20:45  03/28/18 21:47





  Nystop Powder -  TP   1 applic





  DAILY FESTUS   Administration


 


Pantoprazole Sodium  40 mg  03/29/18 10:00  





  Protonix Iv  IVPUSH   





  DAILY FESTUS   


 


Zolpidem Tartrate  5 mg  03/28/18 20:35  03/28/18 23:19





  Ambien -  PO   5 mg





  HS PRN   Administration





  INSOMNIA   











ASSESSMENT/PLAN:


68 y.o. F with pmh HTN, HLD, DM, CAD s/p stent, Alzheimer's dz, NHL s/p chemo (

2013; currently on no agents), recent sepsis 2/2 blocked L ureteral stent (

[facility]), s/p L ureteral stent placement (1/2018), urinary 

incontinence, who presented with worsening hematuria and resistant UTI





#Resistant UTI, failed oral abx with hx of l ureteral stent


-UA: 3+ blood, +nitrite, 3+ leuk esterase, 2203 WBCs


-Increased frequency hematuria


-Received meropenem IVPB 1g x 1 in ED


-Continue Azactam, started on clindamycin


-ID consult-Dr. Bates


-Urology consult- Dr. Quick


-gentle IVF


-F/u blood cx, urine cx 





#Yeast infection in groin area


-Received diflucan 150mg x 1 in ED 


-Continue nystatin powder use daily





#Hx 15 lb weight loss 


-without B sx, however sig weight loss


-15 lbs over last 3 months


-Recommend outpatient f/u





#CAD s/p stent


-Hold plavix 75mg PO qd in case of procedure





#HTN- controlled


-Continue metoprolol succinate 50mg qd





#HLD


-Continue pravastatin 10mg PO qHS





#DM


-will hold home metformin


-ISS ACHS


-BGM





#Alzheimer's dz


-Continue aricept 10mg PO HS





#Insomnia


-Continue ambien 5mg PO qHS PRN





#Depression/pseudodementia


-Will hold Trazodone 50 PO qd


-Qtc 463ms borderline elevated


-Continue to monitor 





#F/E/N


-gentle fluids IV NS 75 cc/hr


-Continue to monitor electrolytes


-Diabetic/sodium diet





#PPX


DVT: SCD's, 


GI: Protonix





#Dispo


M/S





Visit type





- Emergency Visit


Emergency Visit: Yes


ED Registration Date: 03/28/18


Care time: The patient presented to the Emergency Department on the above date 

and was hospitalized for further evaluation of their emergent condition.





- New Patient


This patient is new to me today: Yes


Date on this admission: 03/29/18





- Critical Care


Critical Care patient: No





<AlonsoludmiladavidJohny - Last Filed: 03/29/18 18:36>


Physical Exam: 


 Patient has seen and examined. Patient has 5 yr hx of Alzhemeir's Dementia. As 

per Patient's daughter, she takes care of her at home. Patient had the stent 

placement 2 months ago but continued to have infection and was placed on long 

term po antibiotics. Admitted for acute UTI on IV Azactam and clindamycin since 

patient is allergic to PCN.





 Vital Signs











Temperature  98.2 F   03/29/18 13:41


 


Pulse Rate  74   03/29/18 13:41


 


Respiratory Rate  18   03/29/18 13:41


 


Blood Pressure  149/79   03/29/18 13:41


 


O2 Sat by Pulse Oximetry (%)  96   03/29/18 14:24








 CBCD











WBC  7.6 K/mm3 (4.0-10.0)  D 03/29/18  07:50    


 


RBC  4.11 M/mm3 (3.60-5.2)   03/29/18  07:50    


 


Hgb  11.4 GM/dL (10.7-15.3)   03/29/18  07:50    


 


Hct  33.2 % (32.4-45.2)   03/29/18  07:50    


 


MCV  80.9 fl (80-96)   03/29/18  07:50    


 


MCHC  34.3 g/dl (32.0-36.0)   03/29/18  07:50    


 


RDW  15.3 % (11.6-15.6)   03/29/18  07:50    


 


Plt Count  158 K/MM3 (134-434)   03/29/18  07:50    


 


MPV  8.4 fl (7.5-11.1)   03/29/18  07:50    








 CMP











Sodium  138 mmol/L (136-145)   03/29/18  07:50    


 


Potassium  3.6 mmol/L (3.5-5.1)   03/29/18  07:50    


 


Chloride  99 mmol/L ()   03/29/18  07:50    


 


Carbon Dioxide  25 mmol/L (21-32)   03/29/18  07:50    


 


Anion Gap  14  (8-16)   03/29/18  07:50    


 


BUN  13 mg/dL (7-18)   03/29/18  07:50    


 


Creatinine  0.7 mg/dL (0.55-1.02)   03/29/18  07:50    


 


Creat Clearance w eGFR  > 60  (>60)   03/28/18  14:45    


 


Random Glucose  144 mg/dL ()  H  03/29/18  07:50    


 


Calcium  8.1 mg/dL (8.5-10.1)  L  03/29/18  07:50    


 


Total Bilirubin  0.5 mg/dL (0.2-1.0)   03/28/18  14:45    


 


AST  15 U/L (15-37)   03/28/18  14:45    


 


ALT  19 U/L (12-78)   03/28/18  14:45    


 


Alkaline Phosphatase  99 U/L ()   03/28/18  14:45    


 


Total Protein  7.3 g/dl (6.4-8.2)   03/28/18  14:45    


 


Albumin  3.6 g/dl (3.4-5.0)   03/28/18  14:45    








 Current Medications











Generic Name Dose Route Start Last Admin





  Trade Name Freq  PRN Reason Stop Dose Admin


 


Atorvastatin Calcium  10 mg  03/28/18 22:00  03/28/18 23:07





  Lipitor -  PO   10 mg





  HS FESTUS   Administration


 


Donepezil HCl  10 mg  03/28/18 20:30  03/29/18 15:44





  Aricept -  PO   Not Given





  DAILY FESTUS   


 


Sodium Chloride  1,000 mls @ 75 mls/hr  03/28/18 20:30  03/29/18 08:41





  Normal Saline -  IV   75 mls/hr





  ASDIR FESTUS   Administration


 


Aztreonam 1 gm/ Dextrose  50 mls @ 100 mls/hr  03/28/18 20:30  





  IVPB   





  Q8H FESTUS   





  Protocol   


 


Clindamycin Phosphate  600 mg in 50 mls @ 100 mls/hr  03/29/18 11:00  03/29/18 

12:37





  Cleocin 600 Mg Premix Ivpb -  IVPB   100 mls/hr





  Q8H-IV FESTUS   Administration


 


Insulin Aspart  1 vial  03/28/18 22:00  03/29/18 12:20





  Novolog Vial Sliding Scale -  SQ   Not Given





  ACHS FESTUS   





  Protocol   


 


Metoprolol Succinate  50 mg  03/28/18 20:45  03/29/18 15:45





  Toprol Xl -  PO   Not Given





  DAILY FESTUS   


 


Nystatin  1 applic  03/28/18 20:45  03/29/18 12:38





  Nystop Powder -  TP   1 applic





  DAILY FESTUS   Administration


 


Pantoprazole Sodium  40 mg  03/29/18 10:00  03/29/18 12:37





  Protonix Iv  IVPUSH   40 mg





  DAILY FESTUS   Administration


 


Zolpidem Tartrate  5 mg  03/28/18 20:35  03/28/18 23:19





  Ambien -  PO   5 mg





  HS PRN   Administration





  INSOMNIA   








 Home Medications











 Medication  Instructions  Recorded


 


Metformin HCl 1,000 mg PO BIDAC 09/25/14


 


Metoprolol Tartrate [Lopressor -] 50 mg PO DAILY 09/25/14


 


Pravastatin Sodium 10 mg PO HS 03/28/18


 


Trazodone HCl 50 mg PO HS 03/28/18


 


Amlodipine Besylate [Norvasc -] 5 mg PO DAILY 03/29/18


 


Donepezil HCl 10 mg PO HS 03/29/18


 


Insulin Glargine,Hum.rec.anlog 35 unit SQ DAILY 03/29/18





[Lantus Solostar]  


 


Pantoprazole Sodium [Protonix] 40 mg PO DAILY 03/29/18


 


Zolpidem Tartrate [Ambien] 10 mg PO HS 03/29/18

## 2018-03-29 NOTE — EKG
Test Reason : 

Blood Pressure : ***/*** mmHG

Vent. Rate : 074 BPM     Atrial Rate : 074 BPM

   P-R Int : 172 ms          QRS Dur : 088 ms

    QT Int : 418 ms       P-R-T Axes : 062 -35 089 degrees

   QTc Int : 463 ms

 

*** POOR DATA QUALITY, INTERPRETATION MAY BE ADVERSELY AFFECTED

NORMAL SINUS RHYTHM

LEFT AXIS DEVIATION

MODERATE VOLTAGE CRITERIA FOR LVH, MAY BE NORMAL VARIANT

ABNORMAL ECG

WHEN COMPARED WITH ECG OF 26-SEP-2014 06:07,

NO SIGNIFICANT CHANGE WAS FOUND

Confirmed by MAEVE LARIOS MD ([id number]) on 3/29/2018 11:38:47 AM

 

Referred By:             Confirmed By:MAEVE LARIOS MD

## 2018-03-29 NOTE — CON.ID
Consult


Consult Specialty:: Infectious Diseases


Reason for Consultation:: Persistent UTI





- History of Present Illness


Chief Complaint: Hematuria


History of Present Illness: 





Ms. Trevizo is a 67 y/o F with PMH HTN, HLD, DM, CAD s/p stent, Alzheimer's dz, 

NHL s/p chemo (2013; currently on no agents), recent sepsis 2/2 blocked L 

ureteral stent ([facility]), s/p L ureteral stent placement (1/2018), 

urinary incontinence, who presents to the ED c/o worsening hematuria over the 

past week. As per daughter, in January, pt had a L ureteral stent placed. It 

was supposed to be removed in February. Ever since its placement, pt has had 

intermittent hematuria. She saw her urologist, Dr. Baer and was 

subsequently found to have a positive urine cx with pyuria (Beta Strep)and was 

placed on PO Cipro for a week. However, she had persistence of her UTI with (+) 

c/s and a longer course of Rx with Moxifloxacin was given. I had spoken to Dr. Quick regarding postponement of stent removal in lieu of persistent UTI. A 

few days ago, her daughter, reported increased hematuria and upon speaking with 

her PMD, Dr. Aponte, the decision was made to admit her. She has been having 

intermittent fever or chills.





- History Source


History Provided By: Family Member


Limitations to Obtaining History: No Limitations





- Past Medical History


CNS: Yes: Dementia


Cardio/Vascular: Yes: HTN, Hyperlipdemia





- Alcohol/Substance Use


Hx Alcohol Use: No


History of Substance Use: reports: None





- Smoking History


Smoking history: Never smoked


Have you smoked in the past 12 months: No


Aproximately how many cigarettes per day: 0





- Social History


Usual Living Arrangement: With Child


ADL: Independent


History of Recent Travel: No





Home Medications





- Allergies


Allergies/Adverse Reactions: 


 Allergies











Allergy/AdvReac Type Severity Reaction Status Date / Time


 


Penicillins Allergy   Verified 03/28/18 14:39














- Home Medications


Home Medications: 


Ambulatory Orders





Donepezil HCl [Aricept -] 5 mg PO DAILY 09/10/14 


Clopidogrel Bisulfate [Plavix -] 75 mg PO DAILY 09/25/14 


Metformin HCl 850 mg PO DAILY 09/25/14 


Metoprolol Tartrate [Lopressor -] 25 mg PO DAILY 09/25/14 


Pravastatin Sodium 10 mg PO DAILY 03/28/18 


Trazodone HCl DAILY 03/28/18 











Review of Systems


Unable to obtain ROS, reason: Dementia





Physical Exam


Vital Signs: 


 Vital Signs











Temperature  98.2 F   03/29/18 08:55


 


Pulse Rate  74   03/29/18 08:55


 


Respiratory Rate  18   03/29/18 09:00


 


Blood Pressure  149/79   03/29/18 08:55


 


O2 Sat by Pulse Oximetry (%)  96   03/29/18 09:00











Constitutional: Yes: Well Nourished


Eyes: Yes: Conjunctiva Clear


Neck: Yes: Supple


Cardiovascular: Yes: Regular Rate and Rhythm


Respiratory: Yes: Regular


Gastrointestinal: Yes: Normal Bowel Sounds


Neurological: Yes: Alert, Confusion


Labs: 


 CBC, BMP





 03/29/18 07:50 





 03/29/18 07:50 











Imaging





- Results


Chest X-ray: Report Reviewed





Assessment/Plan





Pt with Hematuria, UTI


PCN Allergy.





Await final cultures


Allergy evaluation for PCN allergy


Continue Azacatam 2 gr q 8


Add Clinda 600 mg q 8

## 2018-03-30 LAB
ANION GAP SERPL CALC-SCNC: 13 MMOL/L (ref 8–16)
BASOPHILS # BLD: 0.6 % (ref 0–2)
BUN SERPL-MCNC: 10 MG/DL (ref 7–18)
CALCIUM SERPL-MCNC: 8.4 MG/DL (ref 8.5–10.1)
CHLORIDE SERPL-SCNC: 104 MMOL/L (ref 98–107)
CO2 SERPL-SCNC: 23 MMOL/L (ref 21–32)
CREAT SERPL-MCNC: 0.7 MG/DL (ref 0.55–1.02)
DEPRECATED RDW RBC AUTO: 15.5 % (ref 11.6–15.6)
EOSINOPHIL # BLD: 2.1 % (ref 0–4.5)
GLUCOSE SERPL-MCNC: 180 MG/DL (ref 74–106)
HCT VFR BLD CALC: 33 % (ref 32.4–45.2)
HGB BLD-MCNC: 11.4 GM/DL (ref 10.7–15.3)
LYMPHOCYTES # BLD: 23.9 % (ref 8–40)
MCH RBC QN AUTO: 27.7 PG (ref 25.7–33.7)
MCHC RBC AUTO-ENTMCNC: 34.6 G/DL (ref 32–36)
MCV RBC: 80.2 FL (ref 80–96)
MONOCYTES # BLD AUTO: 9.3 % (ref 3.8–10.2)
NEUTROPHILS # BLD: 64.1 % (ref 42.8–82.8)
PLATELET # BLD AUTO: 164 K/MM3 (ref 134–434)
PMV BLD: 8.6 FL (ref 7.5–11.1)
POTASSIUM SERPLBLD-SCNC: 3.5 MMOL/L (ref 3.5–5.1)
RBC # BLD AUTO: 4.11 M/MM3 (ref 3.6–5.2)
SODIUM SERPL-SCNC: 140 MMOL/L (ref 136–145)
WBC # BLD AUTO: 5.1 K/MM3 (ref 4–10)

## 2018-03-30 RX ADMIN — INSULIN ASPART SCH UNITS: 100 INJECTION, SOLUTION INTRAVENOUS; SUBCUTANEOUS at 12:07

## 2018-03-30 RX ADMIN — ATORVASTATIN CALCIUM SCH MG: 10 TABLET, FILM COATED ORAL at 21:19

## 2018-03-30 RX ADMIN — INSULIN ASPART SCH UNITS: 100 INJECTION, SOLUTION INTRAVENOUS; SUBCUTANEOUS at 17:28

## 2018-03-30 RX ADMIN — CLINDAMYCIN PHOSPHATE SCH MLS/HR: 600 INJECTION, SOLUTION INTRAVENOUS at 10:36

## 2018-03-30 RX ADMIN — INSULIN ASPART SCH UNITS: 100 INJECTION, SOLUTION INTRAVENOUS; SUBCUTANEOUS at 21:19

## 2018-03-30 RX ADMIN — CLINDAMYCIN PHOSPHATE SCH MLS/HR: 600 INJECTION, SOLUTION INTRAVENOUS at 01:34

## 2018-03-30 RX ADMIN — INSULIN ASPART SCH UNITS: 100 INJECTION, SOLUTION INTRAVENOUS; SUBCUTANEOUS at 06:35

## 2018-03-30 RX ADMIN — DONEPEZIL HYDROCHLORIDE SCH MG: 10 TABLET, FILM COATED ORAL at 10:35

## 2018-03-30 RX ADMIN — PANTOPRAZOLE SODIUM SCH MG: 40 INJECTION, POWDER, FOR SOLUTION INTRAVENOUS at 10:35

## 2018-03-30 RX ADMIN — GUAIFENESIN PRN ML: 100 SOLUTION ORAL at 17:21

## 2018-03-30 RX ADMIN — CLINDAMYCIN PHOSPHATE SCH MLS/HR: 600 INJECTION, SOLUTION INTRAVENOUS at 17:23

## 2018-03-30 RX ADMIN — ZOLPIDEM TARTRATE PRN MG: 5 TABLET, COATED ORAL at 01:12

## 2018-03-30 RX ADMIN — NYSTATIN SCH APPLIC: 100000 POWDER TOPICAL at 10:36

## 2018-03-30 NOTE — CON.GU
Consult


Consult Specialty:: 


Referred by:: medicine


Reason for Consultation:: left hydronephrosis





- History of Present Illness


Chief Complaint: left hydronephrosis


History of Present Illness: 





68 year old female with a history of a left UPJ obstruction and recurrent UTIs.

  She reportedly had urosepsis at [facility] last year and a ureteral stent 

was placed. Attempts at cystoscopy and removal of the stent have been thwarted 

due to presistently positive urine culture.  She is admitted to [facility] for IV abx 

as per ID





- History Source


History Provided By: Medical Record


Limitations to Obtaining History: Dementia





- Past Medical History


CNS: Yes: Dementia


Cardio/Vascular: Yes: HTN, Hyperlipdemia


Renal/: Yes: UTI, Other (UPJ obstruction)





- Alcohol/Substance Use


Hx Alcohol Use: No


History of Substance Use: reports: None





- Smoking History


Smoking history: Former smoker


Have you smoked in the past 12 months: Yes


Aproximately how many cigarettes per day: 0


If you are a former smoker, when did you quit?: 6 months ago





- Social History


Usual Living Arrangement: With Child


ADL: Independent


History of Recent Travel: No





Home Medications





- Allergies


Allergies/Adverse Reactions: 


 Allergies











Allergy/AdvReac Type Severity Reaction Status Date / Time


 


Penicillins Allergy   Verified 03/28/18 14:39














- Home Medications


Home Medications: 


Ambulatory Orders





Metformin HCl 1,000 mg PO BIDAC 09/25/14 


Metoprolol Tartrate [Lopressor -] 50 mg PO DAILY 09/25/14 


Pravastatin Sodium 10 mg PO HS 03/28/18 


Trazodone HCl 50 mg PO HS 03/28/18 


Amlodipine Besylate [Norvasc -] 5 mg PO DAILY 03/29/18 


Donepezil HCl 10 mg PO HS 03/29/18 


Insulin Glargine,Hum.rec.anlog [Lantus Solostar] 35 unit SQ DAILY 03/29/18 


Pantoprazole Sodium [Protonix] 40 mg PO DAILY 03/29/18 


Zolpidem Tartrate [Ambien] 10 mg PO HS 03/29/18 











Review of Systems


Unable to obtain ROS, reason: dementia. non verbal, alh





Physical Exam-


Vital Signs: 


 Vital Signs











Temperature  98.1 F   03/29/18 18:56


 


Pulse Rate  82   03/29/18 18:56


 


Respiratory Rate  18   03/29/18 21:00


 


Blood Pressure  163/93   03/29/18 18:56


 


O2 Sat by Pulse Oximetry (%)  96   03/29/18 21:00











Renal/: No: CVA Tenderness - Left, CVA Tenderness - Right


Labs: 


 CBC, BMP





 03/30/18 07:00 





 03/30/18 07:00 











Problem List





- Problems


(1) UPJ obstruction, congenital


Assessment/Plan: 


once patient has a negative urine culture, can proceed with elective removal of 

stent. 


Code(s): Q62.11 - CONGENITAL OCCLUSION OF URETEROPELVIC JUNCTION   





(2) UTI (urinary tract infection)


Code(s): N39.0 - URINARY TRACT INFECTION, SITE NOT SPECIFIED

## 2018-03-30 NOTE — PN
<Grabiel Sung - Last Filed: 03/30/18 13:48>


Physical Exam: 


SUBJECTIVE: Patient seen and examined


No acute events overnight. Patient sleeping comfortably this AM.





OBJECTIVE:





 Vital Signs











 Period  Temp  Pulse  Resp  BP Sys/Silver  Pulse Ox


 


 Last 24 Hr  98.1 F-98.2 F  74-82  18-18  149-163/79-93  96-96











GENERAL: Comfortable. Non-verbal. AAO x 0. Cries out, however in no acute 

distress.


HEAD: Normal with no signs of trauma.


EYES: Pupils equal, round and reactive to light, extraocular movements intact, 

sclera anicteric, conjunctiva clear. 


EARS, NOSE, THROAT: Oropharynx clear without exudates. Dry mucous membranes


NECK: Normal range of motion, supple 


LUNGS: Breath sounds equal. No accessory muscle use. Unable to adequately 

assess as pt with poor inspiratory effort, or response to commands 


HEART: Regular rate and rhythm, normal S1 and S2 without murmur, rub or gallop.


ABDOMEN: Soft, nontender, not distended, normoactive bowel sounds, no guarding, 

no rebound, no masses.  


LOWER EXTREMITIES: 2+ posterior tibial pulses, warm, well-perfused. No calf 

tenderness. No peripheral edema. 


NEUROLOGICAL:  Unable to adequately respond to commands 


PSYCHIATRIC: with dementia 

















 Laboratory Results - last 24 hr











  03/29/18 03/29/18 03/29/18





  11:29 17:55 21:48


 


WBC   


 


RBC   


 


Hgb   


 


Hct   


 


MCV   


 


MCH   


 


MCHC   


 


RDW   


 


Plt Count   


 


MPV   


 


Neutrophils %   


 


Lymphocytes %   


 


Monocytes %   


 


Eosinophils %   


 


Basophils %   


 


Sodium   


 


Potassium   


 


Chloride   


 


Carbon Dioxide   


 


Anion Gap   


 


BUN   


 


Creatinine   


 


POC Glucometer  179  229  285


 


Random Glucose   


 


Calcium   














  03/30/18 03/30/18 03/30/18





  06:05 07:00 07:00


 


WBC   5.1  D 


 


RBC   4.11 


 


Hgb   11.4 


 


Hct   33.0 


 


MCV   80.2 


 


MCH   27.7 


 


MCHC   34.6 


 


RDW   15.5 


 


Plt Count   164 


 


MPV   8.6 


 


Neutrophils %   64.1 


 


Lymphocytes %   23.9 


 


Monocytes %   9.3 


 


Eosinophils %   2.1 


 


Basophils %   0.6 


 


Sodium    140


 


Potassium    3.5


 


Chloride    104


 


Carbon Dioxide    23


 


Anion Gap    13


 


BUN    10


 


Creatinine    0.7


 


POC Glucometer  191  


 


Random Glucose    180 H


 


Calcium    8.4 L








Active Medications











Generic Name Dose Route Start Last Admin





  Trade Name Freq  PRN Reason Stop Dose Admin


 


Atorvastatin Calcium  10 mg  03/28/18 22:00  03/29/18 21:59





  Lipitor -  PO   10 mg





  HS FESTUS   Administration


 


Donepezil HCl  10 mg  03/28/18 20:30  03/30/18 10:35





  Aricept -  PO   10 mg





  DAILY FESTUS   Administration


 


Sodium Chloride  1,000 mls @ 75 mls/hr  03/28/18 20:30  03/29/18 21:59





  Normal Saline -  IV   75 mls/hr





  ASDIR FESTUS   Administration


 


Clindamycin Phosphate  600 mg in 50 mls @ 100 mls/hr  03/29/18 11:00  03/30/18 

10:36





  Cleocin 600 Mg Premix Ivpb -  IVPB   100 mls/hr





  Q8H-IV FESTUS   Administration


 


Insulin Aspart  1 vial  03/28/18 22:00  03/30/18 06:35





  Novolog Vial Sliding Scale -  SQ   2 units





  ACHS FESTUS   Administration





  Protocol   


 


Metoprolol Succinate  50 mg  03/28/18 20:45  03/30/18 10:35





  Toprol Xl -  PO   50 mg





  DAILY FESTUS   Administration


 


Nystatin  1 applic  03/28/18 20:45  03/30/18 10:36





  Nystop Powder -  TP   1 applic





  DAILY FESTUS   Administration


 


Pantoprazole Sodium  40 mg  03/29/18 10:00  03/30/18 10:35





  Protonix Iv  IVPUSH   40 mg





  DAILY FESTUS   Administration


 


Zolpidem Tartrate  5 mg  03/29/18 20:09  03/30/18 01:12





  Ambien -  PO   5 mg





  HS PRN   Administration





  INSOMNIA   











ASSESSMENT/PLAN:


68 y.o. F with pmh HTN, HLD, DM, CAD s/p stent, Alzheimer's dz, NHL s/p chemo (

2013; currently on no agents), recent sepsis 2/2 blocked L ureteral stent (

[facility]), s/p L ureteral stent placement (1/2018), urinary 

incontinence, who presented with worsening hematuria and resistant UTI





#Resistant UTI, failed oral abx with hx of l ureteral stent


-UA: 3+ blood, +nitrite, 3+ leuk esterase, 2203 WBCs


-Increased frequency hematuria


-Received meropenem IVPB 1g x 1 in ED


-Continue Azactam and clindamycin, Patient has penicillin allergy


-ID consult-Dr. Bates


-Urology consult- Dr. Quick, will proceed with elective removal of stent 

after culture is negative


-gentle IVF @ 75 cc/hr


-F/u blood cx, urine cx 





#Yeast infection in groin area


-Received diflucan 150mg x 1 in ED 


-Continue nystatin powder use daily





#Hx 15 lb weight loss 


-without B sx, however sig weight loss


-15 lbs over last 3 months


-Recommend outpatient f/u





#CAD s/p stent


-Hold plavix 75mg PO qd in case of procedure





#HTN- controlled


-Continue metoprolol succinate 50mg qd





#HLD


-Continue pravastatin 10mg PO qHS





#DM


-will hold home metformin


-ISS ACHS


-BGM ACHS





#Alzheimer's dz


-Continue aricept 10mg PO HS





#Insomnia


-Continue ambien 5mg PO qHS PRN





#Depression/pseudodementia


-Will hold Trazodone 50 PO qd


-Qtc 463ms borderline elevated


-Continue to monitor 





#F/E/N


-gentle fluids IV NS 75 cc/hr


-Continue to monitor electrolytes


-Diabetic/sodium diet





#PPX


DVT: SCD's


GI: Protonix





#Dispo


M/S





Visit type





- Emergency Visit


Emergency Visit: Yes


ED Registration Date: 03/28/18


Care time: The patient presented to the Emergency Department on the above date 

and was hospitalized for further evaluation of their emergent condition.





- New Patient


This patient is new to me today: No





- Critical Care


Critical Care patient: No





<Johny Sethi - Last Filed: 03/30/18 18:51>


Physical Exam: 


Patient has no  fever or chills, daughter at bedside. Continue IV antibiotic 

for now. Urology on Board and ID on board.

## 2018-03-31 LAB
ANION GAP SERPL CALC-SCNC: 15 MMOL/L (ref 8–16)
BASOPHILS # BLD: 0.5 % (ref 0–2)
BUN SERPL-MCNC: 12 MG/DL (ref 7–18)
CALCIUM SERPL-MCNC: 8.3 MG/DL (ref 8.5–10.1)
CHLORIDE SERPL-SCNC: 103 MMOL/L (ref 98–107)
CO2 SERPL-SCNC: 21 MMOL/L (ref 21–32)
CREAT SERPL-MCNC: 0.9 MG/DL (ref 0.55–1.02)
DEPRECATED RDW RBC AUTO: 15.4 % (ref 11.6–15.6)
EOSINOPHIL # BLD: 3.3 % (ref 0–4.5)
GLUCOSE SERPL-MCNC: 209 MG/DL (ref 74–106)
HCT VFR BLD CALC: 33.4 % (ref 32.4–45.2)
HGB BLD-MCNC: 11.4 GM/DL (ref 10.7–15.3)
LYMPHOCYTES # BLD: 21.3 % (ref 8–40)
MCH RBC QN AUTO: 27.4 PG (ref 25.7–33.7)
MCHC RBC AUTO-ENTMCNC: 34 G/DL (ref 32–36)
MCV RBC: 80.5 FL (ref 80–96)
MONOCYTES # BLD AUTO: 8.5 % (ref 3.8–10.2)
NEUTROPHILS # BLD: 66.4 % (ref 42.8–82.8)
PLATELET # BLD AUTO: 154 K/MM3 (ref 134–434)
PMV BLD: 7.9 FL (ref 7.5–11.1)
POTASSIUM SERPLBLD-SCNC: 3.4 MMOL/L (ref 3.5–5.1)
RBC # BLD AUTO: 4.15 M/MM3 (ref 3.6–5.2)
SODIUM SERPL-SCNC: 139 MMOL/L (ref 136–145)
WBC # BLD AUTO: 5.4 K/MM3 (ref 4–10)

## 2018-03-31 RX ADMIN — NYSTATIN SCH APPLIC: 100000 POWDER TOPICAL at 11:06

## 2018-03-31 RX ADMIN — CLINDAMYCIN PHOSPHATE SCH MLS/HR: 600 INJECTION, SOLUTION INTRAVENOUS at 18:16

## 2018-03-31 RX ADMIN — INSULIN ASPART SCH UNITS: 100 INJECTION, SOLUTION INTRAVENOUS; SUBCUTANEOUS at 18:16

## 2018-03-31 RX ADMIN — DONEPEZIL HYDROCHLORIDE SCH MG: 10 TABLET, FILM COATED ORAL at 11:00

## 2018-03-31 RX ADMIN — CLINDAMYCIN PHOSPHATE SCH MLS/HR: 600 INJECTION, SOLUTION INTRAVENOUS at 01:35

## 2018-03-31 RX ADMIN — ZOLPIDEM TARTRATE PRN MG: 5 TABLET, COATED ORAL at 00:11

## 2018-03-31 RX ADMIN — ATORVASTATIN CALCIUM SCH MG: 10 TABLET, FILM COATED ORAL at 21:34

## 2018-03-31 RX ADMIN — INSULIN ASPART SCH UNITS: 100 INJECTION, SOLUTION INTRAVENOUS; SUBCUTANEOUS at 12:21

## 2018-03-31 RX ADMIN — GUAIFENESIN PRN ML: 100 SOLUTION ORAL at 16:11

## 2018-03-31 RX ADMIN — ZOLPIDEM TARTRATE PRN MG: 5 TABLET, COATED ORAL at 23:18

## 2018-03-31 RX ADMIN — INSULIN ASPART SCH UNITS: 100 INJECTION, SOLUTION INTRAVENOUS; SUBCUTANEOUS at 06:41

## 2018-03-31 RX ADMIN — INSULIN ASPART SCH UNITS: 100 INJECTION, SOLUTION INTRAVENOUS; SUBCUTANEOUS at 21:34

## 2018-03-31 RX ADMIN — PANTOPRAZOLE SODIUM SCH MG: 40 INJECTION, POWDER, FOR SOLUTION INTRAVENOUS at 11:10

## 2018-03-31 RX ADMIN — CLINDAMYCIN PHOSPHATE SCH MLS/HR: 600 INJECTION, SOLUTION INTRAVENOUS at 11:01

## 2018-03-31 NOTE — PN
Physical Exam: 


SUBJECTIVE: Patient seen and examined


Patient is comfortable with no acute distress. as per daughter patient is on 

Ambien 10mg po qhs. 





OBJECTIVE:








 Vital Signs











Temperature  97.7 F   03/31/18 18:23


 


Pulse Rate  71   03/31/18 18:23


 


Respiratory Rate  20   03/31/18 18:23


 


Blood Pressure  158/87   03/31/18 18:23


 


O2 Sat by Pulse Oximetry (%)  98   03/30/18 21:00








 





GENERAL: The patient is awake, but does not communicate just stares at you.


HEAD: Normal with no signs of trauma.


EYES: PERRL, extraocular movements intact, sclera anicteric, conjunctiva clear. 

No ptosis. 


ENT: Ears normal, nares patent, oropharynx clear without exudates, moist mucous 

membranes.


NECK: Trachea midline, full range of motion, supple. 


LUNGS: Breath sounds equal, clear to auscultation bilaterally, no wheezes, no 

crackles, no accessory muscle use. 


HEART: Regular rate and rhythm, S1, S2 without murmur, rub or gallop.


ABDOMEN: Soft, nontender, nondistended, normoactive bowel sounds, no guarding, 

no rebound, no hepatosplenomegaly, no masses.


EXTREMITIES: 2+ pulses, warm, well-perfused, no edema. 


NEUROLOGICAL: Cranial nerves II through XII grossly intact.  gait is stable 

with the help of the daughter .


SKIN: Warm, dry, normal turgor, no rashes or lesions noted





 CBCD











WBC  5.4 K/mm3 (4.0-10.0)   03/31/18  07:00    


 


RBC  4.15 M/mm3 (3.60-5.2)   03/31/18  07:00    


 


Hgb  11.4 GM/dL (10.7-15.3)   03/31/18  07:00    


 


Hct  33.4 % (32.4-45.2)   03/31/18  07:00    


 


MCV  80.5 fl (80-96)   03/31/18  07:00    


 


MCHC  34.0 g/dl (32.0-36.0)   03/31/18  07:00    


 


RDW  15.4 % (11.6-15.6)   03/31/18  07:00    


 


Plt Count  154 K/MM3 (134-434)   03/31/18  07:00    


 


MPV  7.9 fl (7.5-11.1)   03/31/18  07:00    








 CMP











Sodium  139 mmol/L (136-145)   03/31/18  07:00    


 


Potassium  3.4 mmol/L (3.5-5.1)  L  03/31/18  07:00    


 


Chloride  103 mmol/L ()   03/31/18  07:00    


 


Carbon Dioxide  21 mmol/L (21-32)   03/31/18  07:00    


 


Anion Gap  15  (8-16)   03/31/18  07:00    


 


BUN  12 mg/dL (7-18)   03/31/18  07:00    


 


Creatinine  0.9 mg/dL (0.55-1.02)   03/31/18  07:00    


 


Creat Clearance w eGFR  > 60  (>60)   03/28/18  14:45    


 


Random Glucose  209 mg/dL ()  H  03/31/18  07:00    


 


Calcium  8.3 mg/dL (8.5-10.1)  L  03/31/18  07:00    


 


Total Bilirubin  0.5 mg/dL (0.2-1.0)   03/28/18  14:45    


 


AST  15 U/L (15-37)   03/28/18  14:45    


 


ALT  19 U/L (12-78)   03/28/18  14:45    


 


Alkaline Phosphatase  99 U/L ()   03/28/18  14:45    


 


Total Protein  7.3 g/dl (6.4-8.2)   03/28/18  14:45    


 


Albumin  3.6 g/dl (3.4-5.0)   03/28/18  14:45    








 Current Medications











Generic Name Dose Route Start Last Admin





  Trade Name Freq  PRN Reason Stop Dose Admin


 


Atorvastatin Calcium  10 mg  03/28/18 22:00  03/30/18 21:19





  Lipitor -  PO   10 mg





  HS FESTUS   Administration


 


Donepezil HCl  10 mg  03/28/18 20:30  03/31/18 11:00





  Aricept -  PO   10 mg





  DAILY FESTUS   Administration


 


Guaifenesin  10 ml  03/30/18 12:15  03/31/18 16:11





  Robitussin -  PO   10 ml





  Q8H PRN   Administration





  COUGH   


 


Clindamycin Phosphate  600 mg in 50 mls @ 100 mls/hr  03/29/18 11:00  03/31/18 

18:16





  Cleocin 600 Mg Premix Ivpb -  IVPB   100 mls/hr





  Q8H-IV FESTUS   Administration


 


Insulin Aspart  1 vial  03/28/18 22:00  03/31/18 18:16





  Novolog Vial Sliding Scale -  SQ   8 units





  ACHS FESTUS   Administration





  Protocol   


 


Metoprolol Succinate  50 mg  03/28/18 20:45  03/31/18 11:00





  Toprol Xl -  PO   50 mg





  DAILY FESTUS   Administration


 


Nystatin  1 applic  03/28/18 20:45  03/31/18 11:06





  Nystop Powder -  TP   1 applic





  DAILY FESTUS   Administration


 


Pantoprazole Sodium  40 mg  03/29/18 10:00  03/31/18 11:10





  Protonix Iv  IVPUSH   40 mg





  DAILY FESTUS   Administration


 


Zolpidem Tartrate  10 mg  03/31/18 19:27  





  Ambien -  PO   





  HS PRN   





  INSOMNIA   








 Home Medications











 Medication  Instructions  Recorded


 


Metformin HCl 1,000 mg PO BIDAC 09/25/14


 


Metoprolol Tartrate [Lopressor -] 50 mg PO DAILY 09/25/14


 


Pravastatin Sodium 10 mg PO HS 03/28/18


 


Trazodone HCl 50 mg PO HS 03/28/18


 


Amlodipine Besylate [Norvasc -] 5 mg PO DAILY 03/29/18


 


Donepezil HCl 10 mg PO HS 03/29/18


 


Insulin Glargine,Hum.rec.anlog 35 unit SQ DAILY 03/29/18





[Lantus Solostar]  


 


Pantoprazole Sodium [Protonix] 40 mg PO DAILY 03/29/18


 


Zolpidem Tartrate [Ambien] 10 mg PO HS 03/29/18








ASSESSMENT/PLAN:


68 y.o. F with pmh HTN, HLD, DM, CAD s/p stent, Alzheimer's dz, NHL s/p chemo (

2013; currently on no agents), recent sepsis 2/2 blocked L ureteral stent (

[facility]), s/p L ureteral stent placement (1/2018), urinary 

incontinence, who presented with worsening hematuria and resistant UTI





#Resistant UTI, failed oral abx with hx of l ureteral stent, on IV antibiotic 

continue as per ID.


-Urology consult- Dr. Quick, will proceed with elective removal of stent 

after culture is negative





#Yeast infection in groin area s/p Received diflucan 150mg x 1 in ED, on 

nystatin powder use daily





#CAD s/p stent , plavix is on hold, once the culture is negative, uretral stent 

will be removed . 





#HTN- controlled Continue metoprolol succinate 50mg qd





#HLD Continue pravastatin 10mg PO qHS





#DM will hold home metformin; ISS ACHS; BGM ACHS





#Alzheimer's dz Continue aricept 10mg PO HS





#Insomnia Continue ambien 5mg PO qHS PRN





#Depression/pseudodementia Will hold Trazodone 50 PO qd, Qt 463ms borderline 

elevated , Continue to monitor 





#PPX


DVT: SCD's


GI: Protonix











 





Visit type





- Emergency Visit


Emergency Visit: Yes


ED Registration Date: 03/28/18


Care time: The patient presented to the Emergency Department on the above date 

and was hospitalized for further evaluation of their emergent condition.





- New Patient


This patient is new to me today: No





- Critical Care


Critical Care patient: No





- Discharge Referral


Referred to [facility] No

## 2018-03-31 NOTE — PN
Progress Note, Physician


Chief Complaint: 





Fever


History of Present Illness: 





Ms. Trevizo is a 69 y/o F with PMH HTN, HLD, DM, CAD s/p stent, Alzheimer's dz, 

NHL s/p chemo (2013; currently on no agents), recent sepsis 2/2 blocked L 

ureteral stent ([facility]), s/p L ureteral stent placement (1/2018), 

urinary incontinence, who presents to the ED c/o worsening hematuria over the 

past week. As per daughter, in January, pt had a L ureteral stent placed. It 

was supposed to be removed in February. Ever since its placement, pt has had 

intermittent hematuria. She saw her urologist, Dr. Baer and was 

subsequently found to have a positive urine cx with pyuria (Beta Strep)and was 

placed on PO Cipro for a week. However, she had persistence of her UTI with (+) 

c/s and a longer course of Rx with Moxifloxacin was given. Hospital course 

significant for (+) Strep in the urine ( same organism isolated as outpatient )





- Current Medication List


Current Medications: 


Active Medications





Atorvastatin Calcium (Lipitor -)  10 mg PO HS [facility]


   Last Admin: 03/30/18 21:19 Dose:  10 mg


Donepezil HCl (Aricept -)  10 mg PO DAILY FESTUS


   Last Admin: 03/31/18 11:00 Dose:  10 mg


Guaifenesin (Robitussin -)  10 ml PO Q8H PRN


   PRN Reason: COUGH


   Last Admin: 03/30/18 17:21 Dose:  10 ml


Clindamycin Phosphate (Cleocin 600 Mg Premix Ivpb -)  600 mg in 50 mls @ 100 mls

/hr IVPB Q8H-IV FESTUS


   Last Admin: 03/31/18 11:01 Dose:  100 mls/hr


Insulin Aspart (Novolog Vial Sliding Scale -)  1 vial SQ ACHS FESTUS


   PRN Reason: Protocol


   Last Admin: 03/31/18 06:41 Dose:  4 units


Metoprolol Succinate (Toprol Xl -)  50 mg PO DAILY [facility]


   Last Admin: 03/31/18 11:00 Dose:  50 mg


Nystatin (Nystop Powder -)  1 applic TP DAILY [facility]


   Last Admin: 03/31/18 11:06 Dose:  1 applic


Pantoprazole Sodium (Protonix Iv)  40 mg IVPUSH DAILY [facility]


   Last Admin: 03/31/18 11:10 Dose:  40 mg


Zolpidem Tartrate (Ambien -)  5 mg PO HS PRN


   PRN Reason: INSOMNIA


   Last Admin: 03/31/18 00:11 Dose:  5 mg











- Objective


Vital Signs: 


 Vital Signs











Temperature  98.7 F   03/31/18 09:45


 


Pulse Rate  66   03/31/18 09:45


 


Respiratory Rate  18   03/31/18 09:45


 


Blood Pressure  134/75   03/31/18 09:45


 


O2 Sat by Pulse Oximetry (%)  98   03/30/18 21:00











Constitutional: Yes: Well Nourished, Calm


Cardiovascular: Yes: Regular Rate and Rhythm


Respiratory: Yes: CTA Bilaterally


Gastrointestinal: Yes: Normal Bowel Sounds, Soft


Labs: 


 CBC, BMP





 03/31/18 07:00 





 03/31/18 07:00 











Assessment/Plan





Pt with Hematuria, UTI


PCN Allergy.





Continue Clinda 


DC Azactam


Repeat UA and c/s tomorrow

## 2018-04-01 RX ADMIN — ATORVASTATIN CALCIUM SCH MG: 10 TABLET, FILM COATED ORAL at 22:02

## 2018-04-01 RX ADMIN — GUAIFENESIN PRN ML: 100 SOLUTION ORAL at 12:41

## 2018-04-01 RX ADMIN — INSULIN ASPART SCH UNITS: 100 INJECTION, SOLUTION INTRAVENOUS; SUBCUTANEOUS at 17:24

## 2018-04-01 RX ADMIN — INSULIN ASPART SCH UNITS: 100 INJECTION, SOLUTION INTRAVENOUS; SUBCUTANEOUS at 12:41

## 2018-04-01 RX ADMIN — INSULIN ASPART SCH UNITS: 100 INJECTION, SOLUTION INTRAVENOUS; SUBCUTANEOUS at 22:02

## 2018-04-01 RX ADMIN — DONEPEZIL HYDROCHLORIDE SCH MG: 10 TABLET, FILM COATED ORAL at 11:11

## 2018-04-01 RX ADMIN — INSULIN ASPART SCH UNITS: 100 INJECTION, SOLUTION INTRAVENOUS; SUBCUTANEOUS at 06:29

## 2018-04-01 RX ADMIN — CLINDAMYCIN PHOSPHATE SCH MLS/HR: 600 INJECTION, SOLUTION INTRAVENOUS at 11:11

## 2018-04-01 RX ADMIN — CLINDAMYCIN PHOSPHATE SCH MLS/HR: 600 INJECTION, SOLUTION INTRAVENOUS at 19:05

## 2018-04-01 RX ADMIN — PANTOPRAZOLE SODIUM SCH MG: 40 INJECTION, POWDER, FOR SOLUTION INTRAVENOUS at 11:12

## 2018-04-01 RX ADMIN — ZOLPIDEM TARTRATE PRN MG: 5 TABLET, COATED ORAL at 23:14

## 2018-04-01 RX ADMIN — NYSTATIN SCH APPLIC: 100000 POWDER TOPICAL at 11:12

## 2018-04-01 RX ADMIN — CLINDAMYCIN PHOSPHATE SCH MLS/HR: 600 INJECTION, SOLUTION INTRAVENOUS at 01:51

## 2018-04-01 NOTE — PN
<Jeanie Weeks - Last Filed: 04/01/18 17:34>


Physical Exam: 


SUBJECTIVE: Patient seen and examined, family at bedside. No complaints; 








OBJECTIVE:





 Vital Signs











 Period  Temp  Pulse  Resp  BP Sys/Silver  Pulse Ox


 


 Last 24 Hr  97.4 F-98.0 F  66-73  14-20  137-184/70-95  93-95











GENERAL: baseline dementia; did not speak today;  


LUNGS: Breath sounds equal, clear to auscultation bilaterally, no wheezes, no 

crackles, no 


accessory muscle use. 


HEART: Regular rate and rhythm, S1, S2 without murmur, rub or gallop.


ABDOMEN: Soft, nontender, nondistended, normoactive bowel sounds, no guarding, 

no 


rebound, no hepatosplenomegaly, no masses.


EXTREMITIES: 2+ pulses, warm, well-perfused, no edema. 











 Laboratory Results - last 24 hr











  03/31/18 03/31/18 04/01/18





  17:25 21:31 06:26


 


POC Glucometer  328  282  229














  04/01/18





  11:20


 


POC Glucometer  358








Active Medications











Generic Name Dose Route Start Last Admin





  Trade Name Freq  PRN Reason Stop Dose Admin


 


Atorvastatin Calcium  10 mg  03/28/18 22:00  03/31/18 21:34





  Lipitor -  PO   10 mg





  HS FESTUS   Administration


 


Donepezil HCl  10 mg  03/28/18 20:30  04/01/18 11:11





  Aricept -  PO   10 mg





  DAILY FESTUS   Administration


 


Guaifenesin  10 ml  03/30/18 12:15  04/01/18 12:41





  Robitussin -  PO   10 ml





  Q8H PRN   Administration





  COUGH   


 


Clindamycin Phosphate  600 mg in 50 mls @ 100 mls/hr  03/29/18 11:00  04/01/18 

11:11





  Cleocin 600 Mg Premix Ivpb -  IVPB   100 mls/hr





  Q8H-IV FESTUS   Administration


 


Insulin Aspart  1 vial  03/28/18 22:00  04/01/18 17:24





  Novolog Vial Sliding Scale -  SQ   8 units





  ACHS FESTUS   Administration





  Protocol   


 


Metoprolol Succinate  50 mg  03/28/18 20:45  04/01/18 11:12





  Toprol Xl -  PO   50 mg





  DAILY FESTUS   Administration


 


Nystatin  1 applic  03/28/18 20:45  04/01/18 11:12





  Nystop Powder -  TP   1 applic





  DAILY FESTUS   Administration


 


Pantoprazole Sodium  40 mg  03/29/18 10:00  04/01/18 11:12





  Protonix Iv  IVPUSH   40 mg





  DAILY FESTUS   Administration


 


Zolpidem Tartrate  5 mg  03/31/18 19:27  03/31/18 23:18





  Ambien -  PO   5 mg





  HS PRN   Administration





  INSOMNIA   











ASSESSMENT/PLAN:


This is a 68 year old female with a medical history alzheimer's, NHL s/p chemo, 

DM, HTN, HLD, CAD s/p stents, hx recurrent UTIs s/p renal stent, who presents 

with repeat urinary tract infection and hematuria. 








#complicated resistant UTI with hematuria:


-currently on IV clindamycin 


-urine cx + for strep ag.


-will repeat ua and urine culture; if negative plan for stent removal


-appreciate ID and urology








#yeast infection groin


- nystatin powder qd


#Hx 15 lb weight loss 


-without B sx, however sig weight loss


-15 lbs over last 3 months


-Recommend outpatient f/u





#CAD s/p stent


-plavix currently held for potential procedure





#HTN- controlled


-metoprolol succinate 50mg qd





#HLD


-pravastatin 10mg PO qHS





#DM


-bgm ACHS; insulin ss





#Alzheimer's dz


-aricept 10mg PO HS








#Depression/pseudodementia


-hold Trazodone 50 PO qd due to qtc borderline








Disposition: pending urine culture and stent removal














Visit type





- Emergency Visit


Emergency Visit: Yes


ED Registration Date: 03/28/18


Care time: The patient presented to the Emergency Department on the above date 

and was hospitalized for further evaluation of their emergent condition.





- New Patient


This patient is new to me today: Yes


Date on this admission: 04/01/18





- Critical Care


Critical Care patient: No





<Johny Sethi - Last Filed: 04/01/18 19:33>


Physical Exam: 


Agree with the above.

## 2018-04-02 LAB
ANION GAP SERPL CALC-SCNC: 9 MMOL/L (ref 8–16)
BASOPHILS # BLD: 0.5 % (ref 0–2)
BUN SERPL-MCNC: 16 MG/DL (ref 7–18)
CALCIUM SERPL-MCNC: 8.6 MG/DL (ref 8.5–10.1)
CHLORIDE SERPL-SCNC: 104 MMOL/L (ref 98–107)
CO2 SERPL-SCNC: 25 MMOL/L (ref 21–32)
CREAT SERPL-MCNC: 0.8 MG/DL (ref 0.55–1.02)
DEPRECATED RDW RBC AUTO: 15.3 % (ref 11.6–15.6)
EOSINOPHIL # BLD: 2.8 % (ref 0–4.5)
GLUCOSE SERPL-MCNC: 273 MG/DL (ref 74–106)
HCT VFR BLD CALC: 32 % (ref 32.4–45.2)
HGB BLD-MCNC: 11 GM/DL (ref 10.7–15.3)
LYMPHOCYTES # BLD: 31.8 % (ref 8–40)
MCH RBC QN AUTO: 27.5 PG (ref 25.7–33.7)
MCHC RBC AUTO-ENTMCNC: 34.3 G/DL (ref 32–36)
MCV RBC: 80.1 FL (ref 80–96)
MONOCYTES # BLD AUTO: 7.9 % (ref 3.8–10.2)
NEUTROPHILS # BLD: 57 % (ref 42.8–82.8)
PLATELET # BLD AUTO: 188 K/MM3 (ref 134–434)
PMV BLD: 7.7 FL (ref 7.5–11.1)
POTASSIUM SERPLBLD-SCNC: 3.6 MMOL/L (ref 3.5–5.1)
RBC # BLD AUTO: 3.99 M/MM3 (ref 3.6–5.2)
SODIUM SERPL-SCNC: 138 MMOL/L (ref 136–145)
WBC # BLD AUTO: 5.3 K/MM3 (ref 4–10)

## 2018-04-02 RX ADMIN — CLINDAMYCIN PHOSPHATE SCH MLS/HR: 600 INJECTION, SOLUTION INTRAVENOUS at 01:57

## 2018-04-02 RX ADMIN — INSULIN ASPART SCH UNITS: 100 INJECTION, SOLUTION INTRAVENOUS; SUBCUTANEOUS at 12:28

## 2018-04-02 RX ADMIN — ATORVASTATIN CALCIUM SCH MG: 10 TABLET, FILM COATED ORAL at 22:19

## 2018-04-02 RX ADMIN — CLINDAMYCIN PHOSPHATE SCH MLS/HR: 600 INJECTION, SOLUTION INTRAVENOUS at 10:25

## 2018-04-02 RX ADMIN — DONEPEZIL HYDROCHLORIDE SCH MG: 10 TABLET, FILM COATED ORAL at 10:25

## 2018-04-02 RX ADMIN — ZOLPIDEM TARTRATE PRN MG: 5 TABLET, COATED ORAL at 23:16

## 2018-04-02 RX ADMIN — INSULIN ASPART SCH UNITS: 100 INJECTION, SOLUTION INTRAVENOUS; SUBCUTANEOUS at 22:20

## 2018-04-02 RX ADMIN — INSULIN ASPART SCH UNITS: 100 INJECTION, SOLUTION INTRAVENOUS; SUBCUTANEOUS at 17:49

## 2018-04-02 RX ADMIN — POLYETHYLENE GLYCOL 3350 SCH: 17 POWDER, FOR SOLUTION ORAL at 22:20

## 2018-04-02 RX ADMIN — INSULIN ASPART SCH UNITS: 100 INJECTION, SOLUTION INTRAVENOUS; SUBCUTANEOUS at 06:40

## 2018-04-02 RX ADMIN — NYSTATIN SCH APPLIC: 100000 POWDER TOPICAL at 10:25

## 2018-04-02 RX ADMIN — CLINDAMYCIN PHOSPHATE SCH MLS/HR: 600 INJECTION, SOLUTION INTRAVENOUS at 17:49

## 2018-04-02 RX ADMIN — PANTOPRAZOLE SODIUM SCH MG: 40 INJECTION, POWDER, FOR SOLUTION INTRAVENOUS at 10:26

## 2018-04-02 RX ADMIN — POLYETHYLENE GLYCOL 3350 SCH: 17 POWDER, FOR SOLUTION ORAL at 14:41

## 2018-04-02 NOTE — PN
Progress Note (short form)





- Note


Progress Note: 





afebrile


on abx for uti


will plan for cysto/stent removal in am

## 2018-04-02 NOTE — PN
<Grabiel uSng - Last Filed: 04/02/18 15:37>


Physical Exam: 


SUBJECTIVE: Patient seen and examined


No acute events overnight. Patient not speaking. Family stating she has mild 

cough that is new.





OBJECTIVE:





 Vital Signs











 Period  Temp  Pulse  Resp  BP Sys/Silver  Pulse Ox


 


 Last 24 Hr  97.7 F-98.2 F  63-68  15-18  146-184/78-98  











GENERAL: baseline dementia; did not speak today;  


LUNGS: Breath sounds equal, clear to auscultation bilaterally, no wheezes, no 

crackles, no 


accessory muscle use. 


HEART: Regular rate and rhythm, S1, S2 without murmur, rub or gallop.


ABDOMEN: Soft, nontender, nondistended, normoactive bowel sounds, no guarding, 

no 


rebound, no hepatosplenomegaly, no masses.


EXTREMITIES: 2+ pulses, warm, well-perfused, no edema.














 Laboratory Results - last 24 hr











  04/01/18 04/01/18 04/02/18





  17:23 21:07 06:13


 


WBC   


 


RBC   


 


Hgb   


 


Hct   


 


MCV   


 


MCH   


 


MCHC   


 


RDW   


 


Plt Count   


 


MPV   


 


Neutrophils %   


 


Lymphocytes %   


 


Monocytes %   


 


Eosinophils %   


 


Basophils %   


 


Sodium   


 


Potassium   


 


Chloride   


 


Carbon Dioxide   


 


Anion Gap   


 


BUN   


 


Creatinine   


 


POC Glucometer  318  370  297


 


Random Glucose   


 


Calcium   














  04/02/18 04/02/18 04/02/18





  07:46 07:46 11:55


 


WBC  5.3  


 


RBC  3.99  


 


Hgb  11.0  


 


Hct  32.0 L  


 


MCV  80.1  


 


MCH  27.5  


 


MCHC  34.3  


 


RDW  15.3  


 


Plt Count  188  D  


 


MPV  7.7  


 


Neutrophils %  57.0  


 


Lymphocytes %  31.8  D  


 


Monocytes %  7.9  


 


Eosinophils %  2.8  


 


Basophils %  0.5  


 


Sodium   138 


 


Potassium   3.6 


 


Chloride   104 


 


Carbon Dioxide   25 


 


Anion Gap   9 


 


BUN   16 


 


Creatinine   0.8 


 


POC Glucometer    401


 


Random Glucose   273 H 


 


Calcium   8.6 








Active Medications











Generic Name Dose Route Start Last Admin





  Trade Name Freq  PRN Reason Stop Dose Admin


 


Atorvastatin Calcium  10 mg  03/28/18 22:00  04/01/18 22:02





  Lipitor -  PO   10 mg





  HS FESTUS   Administration


 


Donepezil HCl  10 mg  03/28/18 20:30  04/02/18 10:25





  Aricept -  PO   10 mg





  DAILY FESTUS   Administration


 


Guaifenesin  10 ml  03/30/18 12:15  04/01/18 12:41





  Robitussin -  PO   10 ml





  Q8H PRN   Administration





  COUGH   


 


Clindamycin Phosphate  600 mg in 50 mls @ 100 mls/hr  03/29/18 11:00  04/02/18 

10:25





  Cleocin 600 Mg Premix Ivpb -  IVPB   100 mls/hr





  Q8H-IV FESTUS   Administration


 


Insulin Aspart  1 vial  03/28/18 22:00  04/02/18 12:28





  Novolog Vial Sliding Scale -  SQ   10 units





  ACHS FESTUS   Administration





  Protocol   


 


Insulin Detemir  20 units  04/02/18 22:00  





  Levemir Vial  SQ   





  HS FESTUS   


 


Metoprolol Succinate  50 mg  03/28/18 20:45  04/02/18 10:25





  Toprol Xl -  PO   50 mg





  DAILY FESTUS   Administration


 


Nystatin  1 applic  03/28/18 20:45  04/02/18 10:25





  Nystop Powder -  TP   1 applic





  DAILY FESTUS   Administration


 


Pantoprazole Sodium  40 mg  03/29/18 10:00  04/02/18 10:26





  Protonix Iv  IVPUSH   40 mg





  DAILY FESTUS   Administration


 


Polyethylene Glycol  17 gm  04/02/18 13:45  04/02/18 14:41





  Miralax (For Daily Use) -  PO   Not Given





  BID FESTUS   


 


Zolpidem Tartrate  5 mg  03/31/18 19:27  04/01/18 23:14





  Ambien -  PO   5 mg





  HS PRN   Administration





  INSOMNIA   











ASSESSMENT/PLAN:


This is a 68 year old female with a medical history alzheimer's, NHL s/p chemo, 

DM, HTN, HLD, CAD s/p stents, hx recurrent UTIs s/p renal stent, who presents 

with repeat urinary tract infection and hematuria. 








#complicated resistant UTI with hematuria:


-currently on IV clindamycin 600 mg q8h 


-urine cx + for strep ag.


-will repeat UCX +, call placed to urology regarding stent removal.. awaiting 

call back


-appreciate ID and urology





#New cough


-CXR pending, will continue to monitor





#yeast infection groin, improving


- nystatin powder qd





#Hx 15 lb weight loss 


-without B sx


-15 lbs over last 3 months


-Recommend outpatient f/u





#CAD s/p stent


-plavix currently held for potential procedure





#HTN- controlled


-metoprolol succinate 50mg qd





#HLD


-pravastatin 10mg PO qHS





#DM


-bgm ACHS; insulin ss





#Alzheimer's dz


-aricept 10mg PO HS





#Depression/pseudodementia


-hold Trazodone 50 PO qd due to qtc borderline








Disposition: pending stent removal





Visit type





- Emergency Visit


Emergency Visit: Yes


ED Registration Date: 03/28/18


Care time: The patient presented to the Emergency Department on the above date 

and was hospitalized for further evaluation of their emergent condition.





- New Patient


This patient is new to me today: No





- Critical Care


Critical Care patient: No





<Johny Sethi - Last Filed: 04/02/18 16:57>


Physical Exam: 


 Patient is comfortable, patient's family stating that the patient is coughing. 

No cough noted, will get CXr to r/o any infiltrate.





Continue antibiotic as per iD, urology on the case .  urinary stent removal in 

am/cysto.


BP is elevated will add Norvasc 5mg  to her regimen . 





 Vital Signs











Temperature  97.7 F   04/02/18 15:20


 


Pulse Rate  63   04/02/18 15:20


 


Respiratory Rate  18   04/02/18 15:20


 


Blood Pressure  184/83   04/02/18 15:20


 


O2 Sat by Pulse Oximetry (%)  93 L  04/01/18 09:00








 CBCD











WBC  5.3 K/mm3 (4.0-10.0)   04/02/18  07:46    


 


RBC  3.99 M/mm3 (3.60-5.2)   04/02/18  07:46    


 


Hgb  11.0 GM/dL (10.7-15.3)   04/02/18  07:46    


 


Hct  32.0 % (32.4-45.2)  L  04/02/18  07:46    


 


MCV  80.1 fl (80-96)   04/02/18  07:46    


 


MCHC  34.3 g/dl (32.0-36.0)   04/02/18  07:46    


 


RDW  15.3 % (11.6-15.6)   04/02/18  07:46    


 


Plt Count  188 K/MM3 (134-434)  D 04/02/18  07:46    


 


MPV  7.7 fl (7.5-11.1)   04/02/18  07:46    








 CMP











Sodium  138 mmol/L (136-145)   04/02/18  07:46    


 


Potassium  3.6 mmol/L (3.5-5.1)   04/02/18  07:46    


 


Chloride  104 mmol/L ()   04/02/18  07:46    


 


Carbon Dioxide  25 mmol/L (21-32)   04/02/18  07:46    


 


Anion Gap  9  (8-16)   04/02/18  07:46    


 


BUN  16 mg/dL (7-18)   04/02/18  07:46    


 


Creatinine  0.8 mg/dL (0.55-1.02)   04/02/18  07:46    


 


Creat Clearance w eGFR  > 60  (>60)   03/28/18  14:45    


 


Random Glucose  273 mg/dL ()  H  04/02/18  07:46    


 


Calcium  8.6 mg/dL (8.5-10.1)   04/02/18  07:46    


 


Total Bilirubin  0.5 mg/dL (0.2-1.0)   03/28/18  14:45    


 


AST  15 U/L (15-37)   03/28/18  14:45    


 


ALT  19 U/L (12-78)   03/28/18  14:45    


 


Alkaline Phosphatase  99 U/L ()   03/28/18  14:45    


 


Total Protein  7.3 g/dl (6.4-8.2)   03/28/18  14:45    


 


Albumin  3.6 g/dl (3.4-5.0)   03/28/18  14:45    








 Current Medications











Generic Name Dose Route Start Last Admin





  Trade Name Freq  PRN Reason Stop Dose Admin


 


Atorvastatin Calcium  10 mg  03/28/18 22:00  04/01/18 22:02





  Lipitor -  PO   10 mg





  HS FESTUS   Administration


 


Donepezil HCl  10 mg  03/28/18 20:30  04/02/18 10:25





  Aricept -  PO   10 mg





  DAILY FESTUS   Administration


 


Guaifenesin  10 ml  03/30/18 12:15  04/01/18 12:41





  Robitussin -  PO   10 ml





  Q8H PRN   Administration





  COUGH   


 


Clindamycin Phosphate  600 mg in 50 mls @ 100 mls/hr  03/29/18 11:00  04/02/18 

10:25





  Cleocin 600 Mg Premix Ivpb -  IVPB   100 mls/hr





  Q8H-IV FESTUS   Administration


 


Insulin Aspart  1 vial  03/28/18 22:00  04/02/18 12:28





  Novolog Vial Sliding Scale -  SQ   10 units





  ACHS FESTUS   Administration





  Protocol   


 


Insulin Detemir  20 units  04/02/18 22:00  





  Levemir Vial  SQ   





  HS [facility]   


 


Metoprolol Succinate  50 mg  03/28/18 20:45  04/02/18 10:25





  Toprol Xl -  PO   50 mg





  DAILY FESTUS   Administration


 


Nystatin  1 applic  03/28/18 20:45  04/02/18 10:25





  Nystop Powder -  TP   1 applic





  DAILY FESTUS   Administration


 


Pantoprazole Sodium  40 mg  03/29/18 10:00  04/02/18 10:26





  Protonix Iv  IVPUSH   40 mg





  DAILY FESTUS   Administration


 


Polyethylene Glycol  17 gm  04/02/18 13:45  04/02/18 14:41





  Miralax (For Daily Use) -  PO   Not Given





  BID FESTUS   


 


Zolpidem Tartrate  5 mg  03/31/18 19:27  04/01/18 23:14





  Ambien -  PO   5 mg





  HS PRN   Administration





  INSOMNIA   








 Home Medications











 Medication  Instructions  Recorded


 


Metformin HCl 1,000 mg PO BIDAC 09/25/14


 


Metoprolol Tartrate [Lopressor -] 50 mg PO DAILY 09/25/14


 


Pravastatin Sodium 10 mg PO HS 03/28/18


 


Trazodone HCl 50 mg PO HS 03/28/18


 


Amlodipine Besylate [Norvasc -] 5 mg PO DAILY 03/29/18


 


Donepezil HCl 10 mg PO HS 03/29/18


 


Insulin Glargine,Hum.rec.anlog 35 unit SQ DAILY 03/29/18





[Lantus Solostar]  


 


Pantoprazole Sodium [Protonix] 40 mg PO DAILY 03/29/18


 


Zolpidem Tartrate [Ambien] 10 mg PO HS 03/29/18

## 2018-04-02 NOTE — PN
Progress Note, Physician


History of Present Illness: 





Ms. Trevizo is a 67 y/o F with PMH HTN, HLD, DM, CAD s/p stent, Alzheimer's dz, 

NHL s/p chemo (2013; currently on no agents), recent sepsis 2/2 blocked L 

ureteral stent ([facility]), s/p L ureteral stent placement (1/2018), 

urinary incontinence, who presents to the ED c/o worsening hematuria over the 

past week. As per daughter, in January, pt had a L ureteral stent placed. It 

was supposed to be removed in February. Ever since its placement, pt has had 

intermittent hematuria. She saw her urologist, Dr. Baer and was 

subsequently found to have a positive urine cx with pyuria (Beta Strep)and was 

placed on PO Cipro for a week. However, she had persistence of her UTI with (+) 

c/s and a longer course of Rx with Moxifloxacin was given. Hospital course 

significant for (+) Strep in the urine ( same organism isolated as outpatient )

. Repeat C/S - CoNS.





- Current Medication List


Current Medications: 


Active Medications





Amlodipine Besylate (Norvasc -)  5 mg PO DAILY [facility]


   Last Admin: 04/02/18 17:49 Dose:  5 mg


Atorvastatin Calcium (Lipitor -)  10 mg PO HS [facility]


   Last Admin: 04/01/18 22:02 Dose:  10 mg


Donepezil HCl (Aricept -)  10 mg PO DAILY [facility]


   Last Admin: 04/02/18 10:25 Dose:  10 mg


Guaifenesin (Robitussin -)  10 ml PO Q8H PRN


   PRN Reason: COUGH


   Last Admin: 04/01/18 12:41 Dose:  10 ml


Clindamycin Phosphate (Cleocin 600 Mg Premix Ivpb -)  600 mg in 50 mls @ 100 mls

/hr IVPB Q8H-IV FESTUS


   Last Admin: 04/02/18 17:49 Dose:  100 mls/hr


Insulin Aspart (Novolog Vial Sliding Scale -)  1 vial SQ ACHS FESTUS


   PRN Reason: Protocol


   Last Admin: 04/02/18 17:49 Dose:  10 units


Insulin Detemir (Levemir Vial)  20 units SQ HS [facility]


Metoprolol Succinate (Toprol Xl -)  50 mg PO DAILY [facility]


   Last Admin: 04/02/18 10:25 Dose:  50 mg


Nystatin (Nystop Powder -)  1 applic TP DAILY [facility]


   Last Admin: 04/02/18 10:25 Dose:  1 applic


Pantoprazole Sodium (Protonix Iv)  40 mg IVPUSH DAILY [facility]


   Last Admin: 04/02/18 10:26 Dose:  40 mg


Polyethylene Glycol (Miralax (For Daily Use) -)  17 gm PO BID [facility]


   Last Admin: 04/02/18 14:41 Dose:  Not Given


Zolpidem Tartrate (Ambien -)  5 mg PO HS PRN


   PRN Reason: INSOMNIA


   Last Admin: 04/01/18 23:14 Dose:  5 mg











- Objective


Vital Signs: 


 Vital Signs











Temperature  97.7 F   04/02/18 15:20


 


Pulse Rate  63   04/02/18 15:20


 


Respiratory Rate  18   04/02/18 15:20


 


Blood Pressure  184/83   04/02/18 15:20


 


O2 Sat by Pulse Oximetry (%)  94 L  04/02/18 09:00











Constitutional: Yes: No Distress


Cardiovascular: Yes: Regular Rate and Rhythm


Respiratory: Yes: CTA Bilaterally


Gastrointestinal: Yes: Normal Bowel Sounds, Soft.  No: Tenderness


Labs: 


 CBC, BMP





 04/02/18 07:46 





 04/02/18 07:46 











Assessment/Plan





Pt with Hematuria, UTI


PCN Allergy.





Continue Clinda 


Repeat Urine c/s noted - CoNS likely contamination.


Appears Strep has been cleared.


OK for stent removal.

## 2018-04-03 LAB
ANION GAP SERPL CALC-SCNC: 9 MMOL/L (ref 8–16)
BASOPHILS # BLD: 0.4 % (ref 0–2)
BUN SERPL-MCNC: 14 MG/DL (ref 7–18)
CALCIUM SERPL-MCNC: 8.4 MG/DL (ref 8.5–10.1)
CHLORIDE SERPL-SCNC: 106 MMOL/L (ref 98–107)
CO2 SERPL-SCNC: 25 MMOL/L (ref 21–32)
CREAT SERPL-MCNC: 0.8 MG/DL (ref 0.55–1.02)
DEPRECATED RDW RBC AUTO: 15.3 % (ref 11.6–15.6)
EOSINOPHIL # BLD: 2.1 % (ref 0–4.5)
GLUCOSE SERPL-MCNC: 169 MG/DL (ref 74–106)
HCT VFR BLD CALC: 33.8 % (ref 32.4–45.2)
HGB BLD-MCNC: 11.7 GM/DL (ref 10.7–15.3)
LYMPHOCYTES # BLD: 26.4 % (ref 8–40)
MCH RBC QN AUTO: 27.4 PG (ref 25.7–33.7)
MCHC RBC AUTO-ENTMCNC: 34.5 G/DL (ref 32–36)
MCV RBC: 79.4 FL (ref 80–96)
MONOCYTES # BLD AUTO: 7.8 % (ref 3.8–10.2)
NEUTROPHILS # BLD: 63.3 % (ref 42.8–82.8)
PLATELET # BLD AUTO: 208 K/MM3 (ref 134–434)
PMV BLD: 7.6 FL (ref 7.5–11.1)
POTASSIUM SERPLBLD-SCNC: 3.5 MMOL/L (ref 3.5–5.1)
RBC # BLD AUTO: 4.26 M/MM3 (ref 3.6–5.2)
SODIUM SERPL-SCNC: 140 MMOL/L (ref 136–145)
WBC # BLD AUTO: 7.1 K/MM3 (ref 4–10)

## 2018-04-03 PROCEDURE — 0TP98DZ REMOVAL OF INTRALUMINAL DEVICE FROM URETER, VIA NATURAL OR ARTIFICIAL OPENING ENDOSCOPIC: ICD-10-PCS | Performed by: UROLOGY

## 2018-04-03 RX ADMIN — INSULIN ASPART SCH: 100 INJECTION, SOLUTION INTRAVENOUS; SUBCUTANEOUS at 06:47

## 2018-04-03 RX ADMIN — ZOLPIDEM TARTRATE PRN MG: 5 TABLET, COATED ORAL at 21:27

## 2018-04-03 RX ADMIN — POLYETHYLENE GLYCOL 3350 SCH GM: 17 POWDER, FOR SOLUTION ORAL at 21:34

## 2018-04-03 RX ADMIN — CLINDAMYCIN PHOSPHATE SCH MLS/HR: 600 INJECTION, SOLUTION INTRAVENOUS at 09:44

## 2018-04-03 RX ADMIN — NYSTATIN SCH APPLIC: 100000 POWDER TOPICAL at 11:34

## 2018-04-03 RX ADMIN — INSULIN ASPART SCH UNITS: 100 INJECTION, SOLUTION INTRAVENOUS; SUBCUTANEOUS at 16:37

## 2018-04-03 RX ADMIN — PANTOPRAZOLE SODIUM SCH MG: 40 INJECTION, POWDER, FOR SOLUTION INTRAVENOUS at 11:34

## 2018-04-03 RX ADMIN — INSULIN DETEMIR SCH UNITS: 100 INJECTION, SOLUTION SUBCUTANEOUS at 21:26

## 2018-04-03 RX ADMIN — INSULIN ASPART SCH UNITS: 100 INJECTION, SOLUTION INTRAVENOUS; SUBCUTANEOUS at 11:37

## 2018-04-03 RX ADMIN — SODIUM CHLORIDE, POTASSIUM CHLORIDE, SODIUM LACTATE AND CALCIUM CHLORIDE SCH MLS/HR: 600; 310; 30; 20 INJECTION, SOLUTION INTRAVENOUS at 15:04

## 2018-04-03 RX ADMIN — POLYETHYLENE GLYCOL 3350 SCH GM: 17 POWDER, FOR SOLUTION ORAL at 10:26

## 2018-04-03 RX ADMIN — AMLODIPINE BESYLATE SCH MG: 5 TABLET ORAL at 09:44

## 2018-04-03 RX ADMIN — ATORVASTATIN CALCIUM SCH MG: 10 TABLET, FILM COATED ORAL at 21:27

## 2018-04-03 RX ADMIN — CLINDAMYCIN PHOSPHATE SCH MLS/HR: 600 INJECTION, SOLUTION INTRAVENOUS at 01:58

## 2018-04-03 RX ADMIN — DONEPEZIL HYDROCHLORIDE SCH MG: 10 TABLET, FILM COATED ORAL at 10:29

## 2018-04-03 RX ADMIN — INSULIN ASPART SCH UNITS: 100 INJECTION, SOLUTION INTRAVENOUS; SUBCUTANEOUS at 21:29

## 2018-04-03 RX ADMIN — CLINDAMYCIN PHOSPHATE SCH MLS/HR: 600 INJECTION, SOLUTION INTRAVENOUS at 17:40

## 2018-04-03 RX ADMIN — SODIUM CHLORIDE, POTASSIUM CHLORIDE, SODIUM LACTATE AND CALCIUM CHLORIDE SCH: 600; 310; 30; 20 INJECTION, SOLUTION INTRAVENOUS at 10:27

## 2018-04-03 NOTE — PN
Progress Note, SLP





- Note


Progress Note: 





 Selected Entries











  04/02/18 04/02/18 04/02/18





  11:00 15:20 18:46


 


Breakfast 75%  


 


Lunch  50% 


 


Supper   75%


 


Temperature   














  04/03/18 04/03/18 04/03/18





  02:00 06:00 08:07


 


Breakfast   


 


Lunch   


 


Supper   


 


Temperature 97.0 F L 97.5 F L 97.6 F














  04/03/18





  09:05


 


Breakfast 


 


Lunch 


 


Supper 


 


Temperature 98.3 F








 Laboratory Tests











  04/03/18





  06:20


 


WBC  7.1  D








CXR (-)





Tolerating diet.

## 2018-04-03 NOTE — PN
Physical Exam: 


SUBJECTIVE: Patient seen and examined


Patient to the OR today for stent removal.





OBJECTIVE:





 Vital Signs











 Period  Temp  Pulse  Resp  BP Sys/Silver  Pulse Ox


 


 Last 24 Hr  97.0 F-97.7 F  62-76  14-18  115-184/58-91  95-98











GENERAL: baseline dementia; did not speak today;  


LUNGS: Breath sounds equal, clear to auscultation bilaterally, no wheezes, no 

crackles, no 


accessory muscle use. 


HEART: Regular rate and rhythm, S1, S2 without murmur, rub or gallop.


ABDOMEN: Soft, nontender, nondistended, normoactive bowel sounds, no guarding, 

no 


rebound, no hepatosplenomegaly, no masses.


EXTREMITIES: 2+ pulses, warm, well-perfused, no edema.

















 Laboratory Results - last 24 hr











  04/02/18 04/02/18 04/02/18





  07:46 11:55 17:07


 


WBC   


 


RBC   


 


Hgb   


 


Hct   


 


MCV   


 


MCH   


 


MCHC   


 


RDW   


 


Plt Count   


 


MPV   


 


Neutrophils %   


 


Lymphocytes %   


 


Monocytes %   


 


Eosinophils %   


 


Basophils %   


 


Sodium  138  


 


Potassium  3.6  


 


Chloride  104  


 


Carbon Dioxide  25  


 


Anion Gap  9  


 


BUN  16  


 


Creatinine  0.8  


 


POC Glucometer   401  373


 


Random Glucose  273 H  


 


Calcium  8.6  














  04/02/18 04/03/18 04/03/18





  22:18 06:20 06:20


 


WBC   7.1  D 


 


RBC   4.26 


 


Hgb   11.7 


 


Hct   33.8 


 


MCV   79.4 L 


 


MCH   27.4 


 


MCHC   34.5 


 


RDW   15.3 


 


Plt Count   208 


 


MPV   7.6 


 


Neutrophils %   63.3 


 


Lymphocytes %   26.4 


 


Monocytes %   7.8 


 


Eosinophils %   2.1 


 


Basophils %   0.4 


 


Sodium    140


 


Potassium    3.5


 


Chloride    106


 


Carbon Dioxide    25


 


Anion Gap    9


 


BUN    14


 


Creatinine    0.8


 


POC Glucometer  387  


 


Random Glucose    169 H


 


Calcium    8.4 L














  04/03/18





  06:47


 


WBC 


 


RBC 


 


Hgb 


 


Hct 


 


MCV 


 


MCH 


 


MCHC 


 


RDW 


 


Plt Count 


 


MPV 


 


Neutrophils % 


 


Lymphocytes % 


 


Monocytes % 


 


Eosinophils % 


 


Basophils % 


 


Sodium 


 


Potassium 


 


Chloride 


 


Carbon Dioxide 


 


Anion Gap 


 


BUN 


 


Creatinine 


 


POC Glucometer  172


 


Random Glucose 


 


Calcium 








Active Medications











Generic Name Dose Route Start Last Admin





  Trade Name Freq  PRN Reason Stop Dose Admin


 


Amlodipine Besylate  5 mg  04/03/18 10:00  





  Norvasc -  PO   





  DAILY FESTUS   


 


Atorvastatin Calcium  10 mg  04/03/18 22:00  





  Lipitor -  PO   





  HS FESTUS   


 


Donepezil HCl  10 mg  04/03/18 10:00  





  Aricept -  PO   





  DAILY [facility]   


 


Guaifenesin  10 ml  04/03/18 08:28  





  Robitussin -  PO   





  Q8H PRN   





  COUGH   


 


Lactated Ringer's  1,000 mls @ 75 mls/hr  04/03/18 08:30  





  Lactated Ringers Solution  IV   





  ASDIR [facility]   


 


Clindamycin Phosphate  600 mg in 50 mls @ 100 mls/hr  04/03/18 10:00  





  Cleocin 600 Mg Premix Ivpb -  IVPB   





  Q8H-IV FESTUS   


 


Insulin Aspart  1 vial  04/03/18 11:00  





  Novolog Vial Sliding Scale -  SQ   





  [facility] FESTUS   





  Protocol   


 


Insulin Detemir  20 units  04/03/18 22:00  





  Levemir Vial  SQ   





  HS [facility]   


 


Metoprolol Succinate  50 mg  04/03/18 10:00  





  Toprol Xl -  PO   





  DAILY FESTUS   


 


Nystatin  1 applic  04/03/18 10:00  





  Nystop Powder -  TP   





  DAILY [facility]   


 


Ondansetron HCl  4 mg  04/03/18 08:22  





  Zofran Injection  IVPUSH   





  Q6H PRN   





  NAUSEA AND/OR VOMITING   


 


Pantoprazole Sodium  40 mg  04/03/18 10:00  





  Protonix Iv  IVPUSH   





  DAILY [facility]   


 


Polyethylene Glycol  17 gm  04/03/18 10:00  





  Miralax (For Daily Use) -  PO   





  BID [facility]   


 


Zolpidem Tartrate  5 mg  04/03/18 08:28  





  Ambien -  PO   





  HS PRN   





  INSOMNIA   











ASSESSMENT/PLAN:


This is a 68 year old female with a medical history alzheimer's, NHL s/p chemo, 

DM, HTN, HLD, CAD s/p stents, hx recurrent UTIs s/p renal stent, who presents 

with repeat urinary tract infection and hematuria. 





#complicated resistant UTI with hematuria:


-currently on IV clindamycin 600 mg q8h 


-urine cx + for strep ag.


-Stent removed today


-appreciate ID and urology





#yeast infection groin, improving


- nystatin powder qd





#Hx 15 lb weight loss 


-without B sx


-15 lbs over last 3 months


-Recommend outpatient f/u





#CAD s/p stent


-plavix on hold, will discuss restarting with urology





#HTN- controlled


-metoprolol succinate 50mg qd





#HLD


-pravastatin 10mg PO qHS





#DM


-bgm ACHS; insulin ss





#Alzheimer's dz


-aricept 10mg PO HS





#Depression/pseudodementia


-hold Trazodone 50 PO qd due to qtc borderline





Visit type





- Emergency Visit


Emergency Visit: Yes


ED Registration Date: 03/28/18


Care time: The patient presented to the Emergency Department on the above date 

and was hospitalized for further evaluation of their emergent condition.





- New Patient


This patient is new to me today: No





- Critical Care


Critical Care patient: No

## 2018-04-03 NOTE — OP
DATE OF OPERATION:  04/03/2018

 

PREOPERATIVE DIAGNOSIS:  Left ureteropelvic junction obstruction.

 

POSTOPERATIVE DIAGNOSIS:  Left ureteropelvic junction obstruction.

 

PROCEDURE:  Cystoscopy, left ureteral stent removal.

 

SURGEON:  Aleks Flynn MD

 

INDICATION:  Patient is a 68-year-old female with a history of left UPJ obstruction

status post ureteroscopy and dilation and stent placement in the past, had been

scheduled for stent removal, but it was thwarted by persistent positive urine

culture.  She is now admitted for IV antibiotics and, during hospitalization, plan to

remove stent.

 

DESCRIPTION OF PROCEDURE:  After informed consent was obtained from the ,

patient was brought to the OR and placed supine on the operating table.  After

cardiac monitoring administered, IV sedation was then given.  The vagina was prepped

and draped in standard surgical fashion.  The rigid cystoscope was inserted into the

urethra without difficulty.  The bladder was visually normal.  The stent was seen

emanating from the left ureteral orifice.  This was grasped with a grasper and taken

out in its entirety and sent to Pathology for analysis.  Patient was awoke from

anesthesia and transferred to the recovery room in stable condition.  There were no

complications.  Estimated blood loss was zero.

 

 

ALEKS FLYNN M.D.

 

[id number]

DD: 04/03/2018 08:10

DT: 04/03/2018 09:07

Job #:  [id number]

## 2018-04-03 NOTE — PN
Teaching Attending Note


Name of Resident: Grabiel Sung





ATTENDING PHYSICIAN STATEMENT





I saw and evaluated the patient.


I reviewed the resident's note and discussed the case with the resident.


I agree with the resident's findings and plan as documented.








SUBJECTIVE:


Patient is comfortable s/p urethral stent removal.





OBJECTIVE:





 Vital Signs











Temperature  98.3 F   04/03/18 09:20


 


Pulse Rate  86   04/03/18 09:20


 


Respiratory Rate  18   04/03/18 09:20


 


Blood Pressure  160/80   04/03/18 09:20


 


O2 Sat by Pulse Oximetry (%)  96   04/03/18 09:05








GENERAL: The patient is awake, but does not communicate just stares at you.


HEAD: Normal with no signs of trauma.


EYES: PERRL, extraocular movements intact, sclera anicteric, conjunctiva clear. 

No ptosis. 


ENT: Ears normal, nares patent, oropharynx clear without exudates, moist mucous 

membranes.


NECK: Trachea midline, full range of motion, supple. 


LUNGS: Breath sounds equal, clear to auscultation bilaterally, no wheezes, no 

crackles, no accessory muscle use. 


HEART: Regular rate and rhythm, S1, S2 without murmur, rub or gallop.


ABDOMEN: Soft, nontender, nondistended, normoactive bowel sounds, no guarding, 

no rebound, no hepatosplenomegaly, no masses.


EXTREMITIES: 2+ pulses, warm, well-perfused, no edema. 


NEUROLOGICAL: Cranial nerves II through XII grossly intact.  gait is stable 

with the help of the daughter .


SKIN: Warm, dry, normal turgor, no rashes or lesions noted


 CBCD











WBC  7.1 K/mm3 (4.0-10.0)  D 04/03/18  06:20    


 


RBC  4.26 M/mm3 (3.60-5.2)   04/03/18  06:20    


 


Hgb  11.7 GM/dL (10.7-15.3)   04/03/18  06:20    


 


Hct  33.8 % (32.4-45.2)   04/03/18  06:20    


 


MCV  79.4 fl (80-96)  L  04/03/18  06:20    


 


MCHC  34.5 g/dl (32.0-36.0)   04/03/18  06:20    


 


RDW  15.3 % (11.6-15.6)   04/03/18  06:20    


 


Plt Count  208 K/MM3 (134-434)   04/03/18  06:20    


 


MPV  7.6 fl (7.5-11.1)   04/03/18  06:20    








 CMP











Sodium  140 mmol/L (136-145)   04/03/18  06:20    


 


Potassium  3.5 mmol/L (3.5-5.1)   04/03/18  06:20    


 


Chloride  106 mmol/L ()   04/03/18  06:20    


 


Carbon Dioxide  25 mmol/L (21-32)   04/03/18  06:20    


 


Anion Gap  9  (8-16)   04/03/18  06:20    


 


BUN  14 mg/dL (7-18)   04/03/18  06:20    


 


Creatinine  0.8 mg/dL (0.55-1.02)   04/03/18  06:20    


 


Creat Clearance w eGFR  > 60  (>60)   03/28/18  14:45    


 


Random Glucose  169 mg/dL ()  H  04/03/18  06:20    


 


Calcium  8.4 mg/dL (8.5-10.1)  L  04/03/18  06:20    


 


Total Bilirubin  0.5 mg/dL (0.2-1.0)   03/28/18  14:45    


 


AST  15 U/L (15-37)   03/28/18  14:45    


 


ALT  19 U/L (12-78)   03/28/18  14:45    


 


Alkaline Phosphatase  99 U/L ()   03/28/18  14:45    


 


Total Protein  7.3 g/dl (6.4-8.2)   03/28/18  14:45    


 


Albumin  3.6 g/dl (3.4-5.0)   03/28/18  14:45    








 Current Medications











Generic Name Dose Route Start Last Admin





  Trade Name Freq  PRN Reason Stop Dose Admin


 


Amlodipine Besylate  5 mg  04/03/18 10:00  04/03/18 09:44





  Norvasc -  PO   5 mg





  DAILY FESTUS   Administration


 


Atorvastatin Calcium  10 mg  04/03/18 22:00  





  Lipitor -  PO   





  HS FESTUS   


 


Donepezil HCl  10 mg  04/03/18 10:00  04/03/18 10:29





  Aricept -  PO   10 mg





  DAILY FESTUS   Administration


 


Guaifenesin  10 ml  04/03/18 08:28  





  Robitussin -  PO   





  Q8H PRN   





  COUGH   


 


Lactated Ringer's  1,000 mls @ 75 mls/hr  04/03/18 08:30  04/03/18 10:27





  Lactated Ringers Solution  IV   Not Given





  ASDIR FESTUS   


 


Clindamycin Phosphate  600 mg in 50 mls @ 100 mls/hr  04/03/18 10:00  04/03/18 

09:44





  Cleocin 600 Mg Premix Ivpb -  IVPB   100 mls/hr





  Q8H-IV FESTUS   Administration


 


Insulin Aspart  1 vial  04/03/18 11:00  04/03/18 11:37





  Novolog Vial Sliding Scale -  SQ   8 units





  ACHS FESTUS   Administration





  Protocol   


 


Insulin Detemir  20 units  04/03/18 22:00  





  Levemir Vial  SQ   





  HS [facility]   


 


Metoprolol Succinate  50 mg  04/03/18 10:00  04/03/18 09:44





  Toprol Xl -  PO   50 mg





  DAILY FESTUS   Administration


 


Nystatin  1 applic  04/03/18 10:00  04/03/18 11:34





  Nystop Powder -  TP   1 applic





  DAILY FESTUS   Administration


 


Ondansetron HCl  4 mg  04/03/18 08:22  





  Zofran Injection  IVPUSH   





  Q6H PRN   





  NAUSEA AND/OR VOMITING   


 


Pantoprazole Sodium  40 mg  04/03/18 10:00  04/03/18 11:34





  Protonix Iv  IVPUSH   40 mg





  DAILY FESTUS   Administration


 


Polyethylene Glycol  17 gm  04/03/18 10:00  04/03/18 10:26





  Miralax (For Daily Use) -  PO   17 gm





  BID FESTUS   Administration


 


Zolpidem Tartrate  5 mg  04/03/18 08:28  





  Ambien -  PO   





  HS PRN   





  INSOMNIA   








 Home Medications











 Medication  Instructions  Recorded


 


Metformin HCl 1,000 mg PO BIDAC 09/25/14


 


Metoprolol Tartrate [Lopressor -] 50 mg PO DAILY 09/25/14


 


Pravastatin Sodium 10 mg PO HS 03/28/18


 


Trazodone HCl 50 mg PO HS 03/28/18


 


Amlodipine Besylate [Norvasc -] 5 mg PO DAILY 03/29/18


 


Donepezil HCl 10 mg PO HS 03/29/18


 


Insulin Glargine,Hum.rec.anlog 35 unit SQ DAILY 03/29/18





[Lantus Solostar]  


 


Pantoprazole Sodium [Protonix] 40 mg PO DAILY 03/29/18


 


Zolpidem Tartrate [Ambien] 10 mg PO HS 03/29/18








 Microbiology





03/28/18 09:25   Blood - Peripheral Venous   Blood Culture - Final


                            NO GROWTH AFTER 5 DAYS INCUBATION


03/28/18 09:25   Blood - Peripheral Venous   Blood Culture - Final


                            NO GROWTH AFTER 5 DAYS INCUBATION


04/01/18 12:30   Urine - Urine Clean Catch   Urine Culture - Preliminary


                            Staphylococcus Coagulase Neg ( Only 50-60K) 


03/28/18 18:20   Urine - Urine Clean Catch   Urine Culture - Final


                            Strep Agalactiae Group B 








ASSESSMENT AND PLAN:


68 y.o. F with pmh HTN, HLD, DM, CAD s/p stent, Alzheimer's dz, NHL s/p chemo (

2013; currently on no agents), recent sepsis 2/2 blocked L ureteral stent (

[facility]), s/p L ureteral stent placement (1/2018), urinary 

incontinence, who presented with worsening hematuria and resistant UTI





# POD #0 urethral stent removal. on IV antibiotic continue as per ID





# Strep Agalactiae Group B  with hx of Resistant UTI, failed outpatient oral 

abx on IV antibiotic Clindamycin as  per ID. Patient went to OR today (4/3/2018

) for stent removal by Dr. Quick. ID on the case. 





#Yeast infection in groin area continue  nystatin powder use daily





#CAD s/p stent , plavix is on hold, once the culture is negative, urethral 

stent will be removed , can start Plavix in am. 





#HTN- controlled Continue metoprolol succinate 50mg qd





#HLD Continue pravastatin 10mg PO qHS





#DM will hold home metformin; ISS ACHS; BGM ACHS





#Alzheimer's dz Continue aricept 10mg PO HS





#Insomnia Continue ambien 5mg PO qHS PRN





#Depression/pseudodementia Will hold Trazodone 50 PO qd, Qt 463ms borderline 

elevated , Continue to monitor

## 2018-04-03 NOTE — OP
Operative Note





- Note:


Operative Date: 04/03/18


Pre-Operative Diagnosis: left upj obstruction


Operation: cysto/stent removal


Post-Operative Diagnosis: Same as Pre-op


Surgeon: Marlo Quick


Anesthesia: General


Specimens Removed: stent


Estimated Blood Loss (mls): 0


Operative Report Dictated: Yes

## 2018-04-04 LAB
ANION GAP SERPL CALC-SCNC: 12 MMOL/L (ref 8–16)
BASOPHILS # BLD: 0.5 % (ref 0–2)
BUN SERPL-MCNC: 24 MG/DL (ref 7–18)
CALCIUM SERPL-MCNC: 8.7 MG/DL (ref 8.5–10.1)
CHLORIDE SERPL-SCNC: 99 MMOL/L (ref 98–107)
CO2 SERPL-SCNC: 27 MMOL/L (ref 21–32)
CREAT SERPL-MCNC: 1.2 MG/DL (ref 0.55–1.02)
DEPRECATED RDW RBC AUTO: 15.2 % (ref 11.6–15.6)
EOSINOPHIL # BLD: 1.7 % (ref 0–4.5)
GLUCOSE SERPL-MCNC: 198 MG/DL (ref 74–106)
HCT VFR BLD CALC: 31.5 % (ref 32.4–45.2)
HGB BLD-MCNC: 11 GM/DL (ref 10.7–15.3)
LYMPHOCYTES # BLD: 27.3 % (ref 8–40)
MCH RBC QN AUTO: 27.7 PG (ref 25.7–33.7)
MCHC RBC AUTO-ENTMCNC: 35 G/DL (ref 32–36)
MCV RBC: 79.2 FL (ref 80–96)
MONOCYTES # BLD AUTO: 7 % (ref 3.8–10.2)
NEUTROPHILS # BLD: 63.5 % (ref 42.8–82.8)
PLATELET # BLD AUTO: 196 K/MM3 (ref 134–434)
PMV BLD: 7.7 FL (ref 7.5–11.1)
POTASSIUM SERPLBLD-SCNC: 3.7 MMOL/L (ref 3.5–5.1)
RBC # BLD AUTO: 3.98 M/MM3 (ref 3.6–5.2)
SODIUM SERPL-SCNC: 138 MMOL/L (ref 136–145)
WBC # BLD AUTO: 6.7 K/MM3 (ref 4–10)

## 2018-04-04 RX ADMIN — DONEPEZIL HYDROCHLORIDE SCH MG: 10 TABLET, FILM COATED ORAL at 11:37

## 2018-04-04 RX ADMIN — ATORVASTATIN CALCIUM SCH MG: 10 TABLET, FILM COATED ORAL at 21:23

## 2018-04-04 RX ADMIN — INSULIN ASPART SCH UNITS: 100 INJECTION, SOLUTION INTRAVENOUS; SUBCUTANEOUS at 06:29

## 2018-04-04 RX ADMIN — SODIUM CHLORIDE SCH MLS/HR: 9 INJECTION, SOLUTION INTRAVENOUS at 12:33

## 2018-04-04 RX ADMIN — INSULIN ASPART SCH UNITS: 100 INJECTION, SOLUTION INTRAVENOUS; SUBCUTANEOUS at 21:22

## 2018-04-04 RX ADMIN — NYSTATIN SCH APPLIC: 100000 POWDER TOPICAL at 11:11

## 2018-04-04 RX ADMIN — CLINDAMYCIN PHOSPHATE SCH MLS/HR: 600 INJECTION, SOLUTION INTRAVENOUS at 11:07

## 2018-04-04 RX ADMIN — INSULIN ASPART SCH UNITS: 100 INJECTION, SOLUTION INTRAVENOUS; SUBCUTANEOUS at 16:34

## 2018-04-04 RX ADMIN — CLINDAMYCIN PHOSPHATE SCH MLS/HR: 600 INJECTION, SOLUTION INTRAVENOUS at 02:25

## 2018-04-04 RX ADMIN — AMLODIPINE BESYLATE SCH MG: 5 TABLET ORAL at 12:36

## 2018-04-04 RX ADMIN — PANTOPRAZOLE SODIUM SCH MG: 40 INJECTION, POWDER, FOR SOLUTION INTRAVENOUS at 13:39

## 2018-04-04 RX ADMIN — POLYETHYLENE GLYCOL 3350 SCH: 17 POWDER, FOR SOLUTION ORAL at 21:12

## 2018-04-04 RX ADMIN — SODIUM CHLORIDE, POTASSIUM CHLORIDE, SODIUM LACTATE AND CALCIUM CHLORIDE SCH: 600; 310; 30; 20 INJECTION, SOLUTION INTRAVENOUS at 17:49

## 2018-04-04 RX ADMIN — POLYETHYLENE GLYCOL 3350 SCH GM: 17 POWDER, FOR SOLUTION ORAL at 11:34

## 2018-04-04 RX ADMIN — INSULIN DETEMIR SCH UNITS: 100 INJECTION, SOLUTION SUBCUTANEOUS at 21:21

## 2018-04-04 RX ADMIN — ZOLPIDEM TARTRATE PRN MG: 5 TABLET, COATED ORAL at 23:27

## 2018-04-04 RX ADMIN — INSULIN ASPART SCH: 100 INJECTION, SOLUTION INTRAVENOUS; SUBCUTANEOUS at 11:40

## 2018-04-04 RX ADMIN — CLINDAMYCIN PHOSPHATE SCH MLS/HR: 600 INJECTION, SOLUTION INTRAVENOUS at 17:48

## 2018-04-04 NOTE — PN
Physical Exam: 


SUBJECTIVE: Patient seen and examined


No acute events overnight. Patient pod#1 from stent removal





OBJECTIVE:





 Vital Signs











 Period  Temp  Pulse  Resp  BP Sys/Silver  Pulse Ox


 


 Last 24 Hr  97.3 F-98.7 F  54-85  18-22  126-168/52-87  98











GENERAL: baseline dementia; did not speak today;  


LUNGS: Breath sounds equal, clear to auscultation bilaterally, no wheezes, no 

crackles, no 


accessory muscle use. 


HEART: Regular rate and rhythm, S1, S2 without murmur, rub or gallop.


ABDOMEN: Soft, nontender, nondistended, normoactive bowel sounds, no guarding, 

no 


rebound, no hepatosplenomegaly, no masses.


EXTREMITIES: 2+ pulses, warm, well-perfused, no edema.

















 Laboratory Results - last 24 hr











  04/03/18 04/03/18 04/04/18





  16:35 21:26 06:28


 


WBC   


 


RBC   


 


Hgb   


 


Hct   


 


MCV   


 


MCH   


 


MCHC   


 


RDW   


 


Plt Count   


 


MPV   


 


Neutrophils %   


 


Lymphocytes %   


 


Monocytes %   


 


Eosinophils %   


 


Basophils %   


 


Sodium   


 


Potassium   


 


Chloride   


 


Carbon Dioxide   


 


Anion Gap   


 


BUN   


 


Creatinine   


 


POC Glucometer  375  281  199


 


Random Glucose   


 


Calcium   














  04/04/18 04/04/18 04/04/18





  07:00 07:00 11:28


 


WBC  6.7  


 


RBC  3.98  


 


Hgb  11.0  


 


Hct  31.5 L  


 


MCV  79.2 L  


 


MCH  27.7  


 


MCHC  35.0  


 


RDW  15.2  


 


Plt Count  196  


 


MPV  7.7  


 


Neutrophils %  63.5  


 


Lymphocytes %  27.3  


 


Monocytes %  7.0  


 


Eosinophils %  1.7  


 


Basophils %  0.5  


 


Sodium   138 


 


Potassium   3.7 


 


Chloride   99 


 


Carbon Dioxide   27 


 


Anion Gap   12 


 


BUN   24 H 


 


Creatinine   1.2 H 


 


POC Glucometer    113


 


Random Glucose   198 H 


 


Calcium   8.7 








Active Medications











Generic Name Dose Route Start Last Admin





  Trade Name Freq  PRN Reason Stop Dose Admin


 


Acetaminophen  650 mg  04/03/18 15:19  





  Tylenol -  PO   





  Q4H PRN   





  PAIN   


 


Amlodipine Besylate  5 mg  04/03/18 10:00  04/04/18 12:36





  Norvasc -  PO   5 mg





  DAILY FESTUS   Administration


 


Atorvastatin Calcium  10 mg  04/03/18 22:00  04/03/18 21:27





  Lipitor -  PO   10 mg





  HS FESTUS   Administration


 


Donepezil HCl  10 mg  04/03/18 10:00  04/04/18 11:37





  Aricept -  PO   10 mg





  DAILY FESTUS   Administration


 


Guaifenesin  10 ml  04/03/18 08:28  04/03/18 21:28





  Robitussin -  PO   10 ml





  Q8H PRN   Administration





  COUGH   


 


Clindamycin Phosphate  600 mg in 50 mls @ 100 mls/hr  04/03/18 10:00  04/04/18 

11:07





  Cleocin 600 Mg Premix Ivpb -  IVPB   100 mls/hr





  Q8H-IV FESTUS   Administration


 


Sodium Chloride  1,000 mls @ 75 mls/hr  04/04/18 10:30  04/04/18 12:33





  Normal Saline -  IV   75 mls/hr





  ASDIR FESTUS   Administration


 


Insulin Aspart  1 vial  04/03/18 11:00  04/04/18 11:40





  Novolog Vial Sliding Scale -  SQ   Not Given





  ACHS FESTUS   





  Protocol   


 


Insulin Detemir  20 units  04/03/18 22:00  04/03/18 21:26





  Levemir Vial  SQ   20 units





  HS FESTUS   Administration


 


Metoprolol Succinate  50 mg  04/03/18 10:00  04/04/18 12:36





  Toprol Xl -  PO   50 mg





  DAILY FESTUS   Administration


 


Nystatin  1 applic  04/03/18 10:00  04/04/18 11:11





  Nystop Powder -  TP   1 applic





  DAILY FESTUS   Administration


 


Ondansetron HCl  4 mg  04/03/18 08:22  





  Zofran Injection  IVPUSH   





  Q6H PRN   





  NAUSEA AND/OR VOMITING   


 


Pantoprazole Sodium  40 mg  04/03/18 10:00  04/03/18 11:34





  Protonix Iv  IVPUSH   40 mg





  DAILY FESTUS   Administration


 


Polyethylene Glycol  17 gm  04/03/18 10:00  04/04/18 11:34





  Miralax (For Daily Use) -  PO   17 gm





  BID FESTUS   Administration


 


Zolpidem Tartrate  5 mg  04/03/18 08:28  04/03/18 21:27





  Ambien -  PO   5 mg





  HS PRN   Administration





  INSOMNIA   











ASSESSMENT/PLAN:


This is a 68 year old female with a medical history alzheimer's, NHL s/p chemo, 

DM, HTN, HLD, CAD s/p stents, hx recurrent UTIs s/p renal stent, who presents 

with repeat urinary tract infection and hematuria. 





#POD 1 s/p stent removal


-on IV clindamycin 600 mg q8h





#complicated resistant UTI with hematuria:


-currently on IV clindamycin 600 mg q8h , will determine length of abx per ID


-urine cx + for strep ag.


-Stent removed


-appreciate ID and urology





#yeast infection groin, improving


- nystatin powder QD





#Hx 15 lb weight loss 


-without B sx


-15 lbs over last 3 months


-Recommend outpatient f/u





#CAD s/p stent


-Will restart plavix





#HTN- controlled


-metoprolol succinate 50mg qd





#HLD


-pravastatin 10mg PO qHS





#DM


-bgm ACHS; insulin ss





#Alzheimer's dz


-aricept 10mg PO HS





#Depression/pseudodementia


-hold Trazodone 50 PO qd due to qtc borderline





Visit type





- Emergency Visit


Emergency Visit: Yes


ED Registration Date: 03/28/18


Care time: The patient presented to the Emergency Department on the above date 

and was hospitalized for further evaluation of their emergent condition.





- New Patient


This patient is new to me today: No





- Critical Care


Critical Care patient: No

## 2018-04-04 NOTE — PATH
Surgical Pathology Report



Patient Name:  BOLIVAR JAMES

Accession #:  [id number]

Med. Rec. #:  [id number]                                                        

   /Age/Gender:  1950 (Age: 68) / F

Account:  [id number]                                                          

             Location: [address] [place] [address]/[facility]

Taken:  4/3/2018

Received:  4/3/2018

Reported:  2018

Physicians:  Marlo Quick M.D.

  



Specimen(s) Received

 LEFT STENT 





Clinical History

UTI







Final Diagnosis

MEDICAL DEVICE, LEFT URETER, REMOVAL:

URETERAL STENT (GROSS ONLY).





***Electronically Signed***

Juan Carlos Tuttle M.D.





Gross Description

Received fresh labeled "left stent," is a 38 cm in length yellow-green, coiled

portion of tubing, consistent with a ureteral stent. No soft tissue is present.

No sections are submitted, gross only. 

/4/3/2018



saudi/4/3/2018

## 2018-04-04 NOTE — PN
Progress Note, SLP





- Note


Progress Note: 





 Selected Entries











  04/03/18 04/03/18 04/03/18





  02:00 06:00 08:07


 


Temperature 97.0 F L 97.5 F L 97.6 F














  04/03/18 04/03/18 04/03/18





  09:05 09:20 14:10


 


Temperature 98.3 F 98.3 F 97.4 F L














  04/03/18 04/03/18 04/04/18





  18:00 22:00 02:00


 


Temperature 97.6 F 98.5 F 98.7 F














  04/04/18





  06:00


 


Temperature 98.3 F








 Laboratory Tests











  04/04/18





  07:00


 


WBC  6.7








 Meals are cut up and fed by family; d/w daughter- pt is eating well without 

difficulty


 Tolerating  % meals, 





No further f/u indicated at this time.

## 2018-04-04 NOTE — PN
Progress Note, Physician


History of Present Illness: 





Admittyed with hematuria and persistent UTI - Streptococcus.


She was started on IV Clindamycin with resultant clearing od Strep in the urine.


The CoNS isolated is a contaminant. Her stents were successfully removed 

yesterday.





- Current Medication List


Current Medications: 


Active Medications





Acetaminophen (Tylenol -)  650 mg PO Q4H PRN


   PRN Reason: PAIN


Amlodipine Besylate (Norvasc -)  5 mg PO DAILY [facility]


   Last Admin: 04/04/18 12:36 Dose:  5 mg


Atorvastatin Calcium (Lipitor -)  10 mg PO HS [facility]


   Last Admin: 04/03/18 21:27 Dose:  10 mg


Donepezil HCl (Aricept -)  10 mg PO DAILY [facility]


   Last Admin: 04/04/18 11:37 Dose:  10 mg


Guaifenesin (Robitussin -)  10 ml PO Q8H PRN


   PRN Reason: COUGH


   Last Admin: 04/03/18 21:28 Dose:  10 ml


Clindamycin Phosphate (Cleocin 600 Mg Premix Ivpb -)  600 mg in 50 mls @ 100 mls

/hr IVPB Q8H-IV [facility]


   Last Admin: 04/04/18 11:07 Dose:  100 mls/hr


Sodium Chloride (Normal Saline -)  1,000 mls @ 75 mls/hr IV ASDIR [facility]


   Last Admin: 04/04/18 12:33 Dose:  75 mls/hr


Insulin Aspart (Novolog Vial Sliding Scale -)  1 vial SQ ACHS [facility]


   PRN Reason: Protocol


   Last Admin: 04/04/18 11:40 Dose:  Not Given


Insulin Detemir (Levemir Vial)  20 units SQ HS [facility]


   Last Admin: 04/03/18 21:26 Dose:  20 units


Metoprolol Succinate (Toprol Xl -)  50 mg PO DAILY [facility]


   Last Admin: 04/04/18 12:36 Dose:  50 mg


Nystatin (Nystop Powder -)  1 applic TP DAILY [facility]


   Last Admin: 04/04/18 11:11 Dose:  1 applic


Ondansetron HCl (Zofran Injection)  4 mg IVPUSH Q6H PRN


   PRN Reason: NAUSEA AND/OR VOMITING


Pantoprazole Sodium (Protonix Iv)  40 mg IVPUSH DAILY [facility]


   Last Admin: 04/04/18 13:39 Dose:  40 mg


Polyethylene Glycol (Miralax (For Daily Use) -)  17 gm PO BID [facility]


   Last Admin: 04/04/18 11:34 Dose:  17 gm


Zolpidem Tartrate (Ambien -)  5 mg PO HS PRN


   PRN Reason: INSOMNIA


   Last Admin: 04/03/18 21:27 Dose:  5 mg











- Objective


Vital Signs: 


 Vital Signs











Temperature  97.3 F L  04/04/18 10:00


 


Pulse Rate  72   04/04/18 12:44


 


Respiratory Rate  18   04/04/18 12:44


 


Blood Pressure  159/73   04/04/18 12:44


 


O2 Sat by Pulse Oximetry (%)  98   04/04/18 09:00











Constitutional: Yes: Well Nourished


Neck: Yes: Supple


Cardiovascular: Yes: Regular Rate and Rhythm


Respiratory: Yes: Regular, CTA Bilaterally


Gastrointestinal: Yes: Normal Bowel Sounds, Soft.  No: Tenderness


Labs: 


 CBC, BMP





 04/04/18 07:00 





 04/04/18 07:00 











Assessment/Plan





Pt with Hematuria, UTI


PCN Allergy.








Can DC Clinda in AM.


Repeat UA and c/s in one week.

## 2018-04-04 NOTE — PN
Progress Note, Physician


Chief Complaint: 





s/p removal ureteral stents, general anesthesia


History of Present Illness: 





post op day one





- Current Medication List


Current Medications: 


Active Medications





Acetaminophen (Tylenol -)  650 mg PO Q4H PRN


   PRN Reason: PAIN


Amlodipine Besylate (Norvasc -)  5 mg PO DAILY [facility]


   Last Admin: 04/04/18 12:36 Dose:  5 mg


Atorvastatin Calcium (Lipitor -)  10 mg PO HS [facility]


   Last Admin: 04/03/18 21:27 Dose:  10 mg


Donepezil HCl (Aricept -)  10 mg PO DAILY [facility]


   Last Admin: 04/04/18 11:37 Dose:  10 mg


Guaifenesin (Robitussin -)  10 ml PO Q8H PRN


   PRN Reason: COUGH


   Last Admin: 04/03/18 21:28 Dose:  10 ml


Clindamycin Phosphate (Cleocin 600 Mg Premix Ivpb -)  600 mg in 50 mls @ 100 mls

/hr IVPB Q8H-IV [facility]


   Last Admin: 04/04/18 11:07 Dose:  100 mls/hr


Sodium Chloride (Normal Saline -)  1,000 mls @ 75 mls/hr IV ASDIR [facility]


   Last Admin: 04/04/18 12:33 Dose:  75 mls/hr


Insulin Aspart (Novolog Vial Sliding Scale -)  1 vial SQ ACHS [facility]


   PRN Reason: Protocol


   Last Admin: 04/04/18 11:40 Dose:  Not Given


Insulin Detemir (Levemir Vial)  20 units SQ HS [facility]


   Last Admin: 04/03/18 21:26 Dose:  20 units


Metoprolol Succinate (Toprol Xl -)  50 mg PO DAILY [facility]


   Last Admin: 04/04/18 12:36 Dose:  50 mg


Nystatin (Nystop Powder -)  1 applic TP DAILY [facility]


   Last Admin: 04/04/18 11:11 Dose:  1 applic


Ondansetron HCl (Zofran Injection)  4 mg IVPUSH Q6H PRN


   PRN Reason: NAUSEA AND/OR VOMITING


Pantoprazole Sodium (Protonix Iv)  40 mg IVPUSH DAILY [facility]


   Last Admin: 04/04/18 13:39 Dose:  40 mg


Polyethylene Glycol (Miralax (For Daily Use) -)  17 gm PO BID [facility]


   Last Admin: 04/04/18 11:34 Dose:  17 gm


Zolpidem Tartrate (Ambien -)  5 mg PO HS PRN


   PRN Reason: INSOMNIA


   Last Admin: 04/03/18 21:27 Dose:  5 mg











- Objective


Vital Signs: 


 Vital Signs











Temperature  97.3 F L  04/04/18 10:00


 


Pulse Rate  72   04/04/18 12:44


 


Respiratory Rate  18   04/04/18 12:44


 


Blood Pressure  159/73   04/04/18 12:44


 


O2 Sat by Pulse Oximetry (%)  98   04/03/18 21:00











Constitutional: Yes: Well Nourished


Cardiovascular: Yes: WNL


Respiratory: Yes: WNL


Gastrointestinal: Yes: WNL


Labs: 


 CBC, BMP





 04/04/18 07:00 





 04/04/18 07:00 











Assessment/Plan





patient non verbal, no apparent complaint, no nausea or vomiting overnight.  

Dept of anesthesia will sign off care at this time

## 2018-04-04 NOTE — PN
Teaching Attending Note


Name of Resident: Grabiel Sung





ATTENDING PHYSICIAN STATEMENT





I saw and evaluated the patient.


I reviewed the resident's note and discussed the case with the resident.


I agree with the resident's findings and plan as documented.








SUBJECTIVE: Patient appears comfortable.








OBJECTIVE:


 Vital Signs











 Period  Temp  Pulse  Resp  BP Sys/Silver  Pulse Ox


 


 Last 24 Hr  97.3 F-98.7 F  54-85  18-22  126-168/52-87  98








HEART: S1S2, RRR


LUNGS: Clear


ABDOMEN: Soft, non-distended, normal BS


EXTREMITIES: No edema








 Laboratory Results - last 24 hr











  04/03/18 04/03/18 04/04/18





  16:35 21:26 06:28


 


WBC   


 


RBC   


 


Hgb   


 


Hct   


 


MCV   


 


MCH   


 


MCHC   


 


RDW   


 


Plt Count   


 


MPV   


 


Neutrophils %   


 


Lymphocytes %   


 


Monocytes %   


 


Eosinophils %   


 


Basophils %   


 


Sodium   


 


Potassium   


 


Chloride   


 


Carbon Dioxide   


 


Anion Gap   


 


BUN   


 


Creatinine   


 


POC Glucometer  375  281  199


 


Random Glucose   


 


Calcium   














  04/04/18 04/04/18 04/04/18





  07:00 07:00 11:28


 


WBC  6.7  


 


RBC  3.98  


 


Hgb  11.0  


 


Hct  31.5 L  


 


MCV  79.2 L  


 


MCH  27.7  


 


MCHC  35.0  


 


RDW  15.2  


 


Plt Count  196  


 


MPV  7.7  


 


Neutrophils %  63.5  


 


Lymphocytes %  27.3  


 


Monocytes %  7.0  


 


Eosinophils %  1.7  


 


Basophils %  0.5  


 


Sodium   138 


 


Potassium   3.7 


 


Chloride   99 


 


Carbon Dioxide   27 


 


Anion Gap   12 


 


BUN   24 H 


 


Creatinine   1.2 H 


 


POC Glucometer    113


 


Random Glucose   198 H 


 


Calcium   8.7 








 Current Medications











Generic Name Dose Route Start Last Admin





  Trade Name Freq  PRN Reason Stop Dose Admin


 


Acetaminophen  650 mg  04/03/18 15:19  





  Tylenol -  PO   





  Q4H PRN   





  PAIN   


 


Amlodipine Besylate  5 mg  04/03/18 10:00  04/04/18 12:36





  Norvasc -  PO   5 mg





  DAILY FESTUS   Administration


 


Atorvastatin Calcium  10 mg  04/03/18 22:00  04/03/18 21:27





  Lipitor -  PO   10 mg





  HS FESTUS   Administration


 


Donepezil HCl  10 mg  04/03/18 10:00  04/04/18 11:37





  Aricept -  PO   10 mg





  DAILY FESTUS   Administration


 


Guaifenesin  10 ml  04/03/18 08:28  04/03/18 21:28





  Robitussin -  PO   10 ml





  Q8H PRN   Administration





  COUGH   


 


Clindamycin Phosphate  600 mg in 50 mls @ 100 mls/hr  04/03/18 10:00  04/04/18 

11:07





  Cleocin 600 Mg Premix Ivpb -  IVPB   100 mls/hr





  Q8H-IV FESTUS   Administration


 


Sodium Chloride  1,000 mls @ 75 mls/hr  04/04/18 10:30  04/04/18 12:33





  Normal Saline -  IV   75 mls/hr





  ASDIR FESTUS   Administration


 


Insulin Aspart  1 vial  04/03/18 11:00  04/04/18 11:40





  Novolog Vial Sliding Scale -  SQ   Not Given





  ACHS FESTUS   





  Protocol   


 


Insulin Detemir  20 units  04/03/18 22:00  04/03/18 21:26





  Levemir Vial  SQ   20 units





  HS FESTUS   Administration


 


Metoprolol Succinate  50 mg  04/03/18 10:00  04/04/18 12:36





  Toprol Xl -  PO   50 mg





  DAILY FESTUS   Administration


 


Nystatin  1 applic  04/03/18 10:00  04/04/18 11:11





  Nystop Powder -  TP   1 applic





  DAILY FESTUS   Administration


 


Ondansetron HCl  4 mg  04/03/18 08:22  





  Zofran Injection  IVPUSH   





  Q6H PRN   





  NAUSEA AND/OR VOMITING   


 


Pantoprazole Sodium  40 mg  04/03/18 10:00  04/04/18 13:39





  Protonix Iv  IVPUSH   40 mg





  DAILY FESTUS   Administration


 


Polyethylene Glycol  17 gm  04/03/18 10:00  04/04/18 11:34





  Miralax (For Daily Use) -  PO   17 gm





  BID FESTUS   Administration


 


Zolpidem Tartrate  5 mg  04/03/18 08:28  04/03/18 21:27





  Ambien -  PO   5 mg





  HS PRN   Administration





  INSOMNIA   














ASSESSMENT AND PLAN:


This is a 68 year old woman with a history of HTN, hyperlipidemia, type 2 DM, 

CAD, history of stent, Alzheimer dementia, NHL who presented to the ED with 

hematuria.





1. Left UPJ obstruction


   - s/p stent removal 4/3


2. Group B Strep UTI


   - Continue Clindamycin


3. CAD, history of stent


   - Continue Toprol XL, Lipitor


   - Plavix held for procedure - will restart


4. HTN


   - Continue Norvasc, Toprol XL


5. Hyperlipidemia


   - Continue Lipitor


6. Type 2 DM


   - Continue Levemir, Novolog sliding scale


   - Metformin held


7. Alzheimer dementia


   - Continue Aricept 


   - Trazodone held secondary to borderline QTc

## 2018-04-05 VITALS — TEMPERATURE: 97.8 F | HEART RATE: 70 BPM | SYSTOLIC BLOOD PRESSURE: 163 MMHG | DIASTOLIC BLOOD PRESSURE: 88 MMHG

## 2018-04-05 LAB
ANION GAP SERPL CALC-SCNC: 13 MMOL/L (ref 8–16)
BUN SERPL-MCNC: 17 MG/DL (ref 7–18)
CALCIUM SERPL-MCNC: 8.4 MG/DL (ref 8.5–10.1)
CHLORIDE SERPL-SCNC: 103 MMOL/L (ref 98–107)
CO2 SERPL-SCNC: 26 MMOL/L (ref 21–32)
CREAT SERPL-MCNC: 0.9 MG/DL (ref 0.55–1.02)
GLUCOSE SERPL-MCNC: 121 MG/DL (ref 74–106)
POTASSIUM SERPLBLD-SCNC: 3.8 MMOL/L (ref 3.5–5.1)
SODIUM SERPL-SCNC: 142 MMOL/L (ref 136–145)

## 2018-04-05 RX ADMIN — NYSTATIN SCH APPLIC: 100000 POWDER TOPICAL at 09:42

## 2018-04-05 RX ADMIN — INSULIN ASPART SCH UNITS: 100 INJECTION, SOLUTION INTRAVENOUS; SUBCUTANEOUS at 11:22

## 2018-04-05 RX ADMIN — SODIUM CHLORIDE SCH: 9 INJECTION, SOLUTION INTRAVENOUS at 11:22

## 2018-04-05 RX ADMIN — SODIUM CHLORIDE SCH MLS/HR: 9 INJECTION, SOLUTION INTRAVENOUS at 01:32

## 2018-04-05 RX ADMIN — DONEPEZIL HYDROCHLORIDE SCH MG: 10 TABLET, FILM COATED ORAL at 09:41

## 2018-04-05 RX ADMIN — AMLODIPINE BESYLATE SCH MG: 5 TABLET ORAL at 09:41

## 2018-04-05 RX ADMIN — INSULIN ASPART SCH UNITS: 100 INJECTION, SOLUTION INTRAVENOUS; SUBCUTANEOUS at 06:03

## 2018-04-05 RX ADMIN — POLYETHYLENE GLYCOL 3350 SCH: 17 POWDER, FOR SOLUTION ORAL at 09:44

## 2018-04-05 RX ADMIN — CLINDAMYCIN PHOSPHATE SCH MLS/HR: 600 INJECTION, SOLUTION INTRAVENOUS at 01:32

## 2018-04-05 RX ADMIN — PANTOPRAZOLE SODIUM SCH: 40 INJECTION, POWDER, FOR SOLUTION INTRAVENOUS at 09:45

## 2018-04-05 NOTE — EKG
Test Reason : 

Blood Pressure : ***/*** mmHG

Vent. Rate : 065 BPM     Atrial Rate : 065 BPM

   P-R Int : 182 ms          QRS Dur : 090 ms

    QT Int : 472 ms       P-R-T Axes : 047 -49 094 degrees

   QTc Int : 490 ms

 

NORMAL SINUS RHYTHM

LEFT AXIS DEVIATION

MODERATE VOLTAGE CRITERIA FOR LVH, MAY BE NORMAL VARIANT

CANNOT RULE OUT SEPTAL INFARCT , AGE UNDETERMINED

T WAVE ABNORMALITY, CONSIDER LATERAL ISCHEMIA

ABNORMAL ECG

WHEN COMPARED WITH ECG OF 28-MAR-2018 19:18,

MINIMAL CRITERIA FOR SEPTAL INFARCT ARE NOW PRESENT

T WAVE INVERSION MORE EVIDENT IN LATERAL LEADS

Confirmed by MAEVE LARIOS MD ([id number]) on 4/5/2018 1:23:09 PM

 

Referred By:             Confirmed By:MAEVE LARIOS MD

## 2018-04-05 NOTE — PN
Teaching Attending Note


Name of Resident: Grabiel Sung





ATTENDING PHYSICIAN STATEMENT





I saw and evaluated the patient.


I reviewed the resident's note and discussed the case with the resident.


I agree with the resident's findings and plan as documented.








SUBJECTIVE: Patient appears comfortable. She is awake and alert but non-verbal.








OBJECTIVE:


 Vital Signs











 Period  Temp  Pulse  Resp  BP Sys/Silver  Pulse Ox


 


 Last 24 Hr  97.1 F-97.9 F  54-79  18-20  112-163/55-88  98








HEART: S1S2, RRR


LUNGS: Clear


ABDOMEN: Soft, non-distended, normal BS


EXTREMITIES: No edema





 Laboratory Results - last 24 hr











  04/04/18 04/04/18 04/05/18





  16:16 21:19 06:00


 


Sodium   


 


Potassium   


 


Chloride   


 


Carbon Dioxide   


 


Anion Gap   


 


BUN   


 


Creatinine   


 


POC Glucometer  336  357  169


 


Random Glucose   


 


Calcium   














  04/05/18 04/05/18





  08:00 10:56


 


Sodium  142 


 


Potassium  3.8 


 


Chloride  103 


 


Carbon Dioxide  26 


 


Anion Gap  13 


 


BUN  17 


 


Creatinine  0.9 


 


POC Glucometer   218


 


Random Glucose  121 H 


 


Calcium  8.4 L 








 Current Medications











Generic Name Dose Route Start Last Admin





  Trade Name Freq  PRN Reason Stop Dose Admin


 


Acetaminophen  650 mg  04/03/18 15:19  04/05/18 00:53





  Tylenol -  PO   650 mg





  Q4H PRN   Administration





  PAIN   


 


Amlodipine Besylate  5 mg  04/03/18 10:00  04/05/18 09:41





  Norvasc -  PO   5 mg





  DAILY FESTUS   Administration


 


Atorvastatin Calcium  10 mg  04/03/18 22:00  04/04/18 21:23





  Lipitor -  PO   10 mg





  HS FESUTS   Administration


 


Donepezil HCl  10 mg  04/03/18 10:00  04/05/18 09:41





  Aricept -  PO   10 mg





  DAILY FESTUS   Administration


 


Guaifenesin  10 ml  04/03/18 08:28  04/03/18 21:28





  Robitussin -  PO   10 ml





  Q8H PRN   Administration





  COUGH   


 


Sodium Chloride  1,000 mls @ 75 mls/hr  04/04/18 10:30  04/05/18 11:22





  Normal Saline -  IV   Not Given





  ASDIR FESTUS   


 


Insulin Aspart  1 vial  04/03/18 11:00  04/05/18 11:22





  Novolog Vial Sliding Scale -  SQ   4 units





  ACHS FESTUS   Administration





  Protocol   


 


Insulin Detemir  20 units  04/03/18 22:00  04/04/18 21:21





  Levemir Vial  SQ   20 units





  HS FESTUS   Administration


 


Metoprolol Succinate  50 mg  04/03/18 10:00  04/05/18 09:41





  Toprol Xl -  PO   50 mg





  DAILY FESTUS   Administration


 


Nystatin  1 applic  04/03/18 10:00  04/05/18 09:42





  Nystop Powder -  TP   1 applic





  DAILY FESTUS   Administration


 


Ondansetron HCl  4 mg  04/03/18 08:22  





  Zofran Injection  IVPUSH   





  Q6H PRN   





  NAUSEA AND/OR VOMITING   


 


Pantoprazole Sodium  40 mg  04/03/18 10:00  04/05/18 09:45





  Protonix Iv  IVPUSH   Not Given





  DAILY FESTUS   


 


Polyethylene Glycol  17 gm  04/03/18 10:00  04/05/18 09:44





  Miralax (For Daily Use) -  PO   Not Given





  BID FESTUS   


 


Zolpidem Tartrate  5 mg  04/03/18 08:28  04/04/18 23:27





  Ambien -  PO   5 mg





  HS PRN   Administration





  INSOMNIA   














ASSESSMENT AND PLAN:


This is a 68 year old woman with a history of HTN, hyperlipidemia, type 2 DM, 

CAD, history of stent, Alzheimer dementia, NHL who presented to the ED with 

hematuria.





1. Left UPJ obstruction


   - s/p stent removal 4/3


2. Group B Strep UTI


   - Completed course of Clindamycin


3. Acute kidney injury


   - Improved with IV fluid


4. CAD, history of stent


   - Continue Toprol XL, Lipitor


   - Patient was no longer taking Plavix


5. HTN


   - Continue Norvasc, Toprol XL


6. Hyperlipidemia


   - Continue Lipitor


7. Type 2 DM


   - Continue Levemir, Novolog sliding scale


   - Metformin held


8. Alzheimer dementia


   - Continue Aricept 


   - Trazodone held secondary to borderline QTc


9. Disposition


   - Discharge home today

## 2018-04-05 NOTE — DS
Physical Exam: 





Microbiology





03/28/18 09:25   Blood - Peripheral Venous   Blood Culture - Preliminary


                              NO GROWTH OBTAINED AFTER 96 HOURS, INCUBATION TO 

CONTINUE


                              FOR 1 DAYS.





 Selected Entries











  04/04/18 04/05/18





  21:00 09:00


 


Temperature  97.8 F


 


Pulse Rate  70


 


Respiratory  18





Rate  


 


Blood Pressure  163/88


 


O2 Sat by Pulse 98 





Oximetry (%)  


 


Oxygen Delivery Room Air 





Method  








 Laboratory Tests











  03/28/18 04/03/18 04/04/18





  18:20 06:20 07:00


 


WBC    6.7


 


Hgb    11.0


 


Hct    31.5 L


 


Plt Count    196


 


Sodium   


 


Potassium   


 


Chloride   


 


Carbon Dioxide   


 


Anion Gap   


 


BUN   14 


 


Creatinine   0.8 


 


Random Glucose   


 


Urine Color  Yellow  


 


Urine Appearance  Turbid  


 


Urine pH  5.0  


 


Ur Specific [place]  1.011  


 


Urine Protein  2+ H  


 


Urine Glucose (UA)  Negative  


 


Urine Ketones  Negative  


 


Urine Blood  3+ H  


 


Urine Nitrite  Positive  


 


Urine Bilirubin  Negative  


 


Urine Urobilinogen  Negative  


 


Ur Leukocyte Esterase  3+ H  


 


Urine WBC (Auto)  2203  


 


Urine RBC (Auto)  734  


 


Ur Epithelial Cells  Rare  


 


Urine Bacteria  Few  


 


Urine Mucus  Rare  














  04/04/18 04/05/18





  07:00 08:00


 


WBC  


 


Hgb  


 


Hct  


 


Plt Count  


 


Sodium   142


 


Potassium   3.8


 


Chloride   103


 


Carbon Dioxide   26


 


Anion Gap   13


 


BUN  24 H  17


 


Creatinine  1.2 H  0.9


 


Random Glucose   121 H


 


Urine Color  


 


Urine Appearance  


 


Urine pH  


 


Ur Specific Gravity  


 


Urine Protein  


 


Urine Glucose (UA)  


 


Urine Ketones  


 


Urine Blood  


 


Urine Nitrite  


 


Urine Bilirubin  


 


Urine Urobilinogen  


 


Ur Leukocyte Esterase  


 


Urine WBC (Auto)  


 


Urine RBC (Auto)  


 


Ur Epithelial Cells  


 


Urine Bacteria  


 


Urine Mucus  








Microbiology





04/01/18 12:30   Urine - Urine Clean Catch   Urine Culture - Final


                              Staphylococcus Coagulase Neg


03/28/18 18:20   Urine - Urine Clean Catch   Urine Culture - Final


                              Strep Agalactiae Group B


03/28/18 09:25   Blood - Peripheral Venous   Blood Culture - Final


                              NO GROWTH AFTER 5 DAYS INCUBATION


03/28/18 09:25   Blood - Peripheral Venous   Blood Culture - Final


                              NO GROWTH AFTER 5 DAYS INCUBATION








CXR--no evidence of infiltrate/effusion





Renal U/s- Mild left hydro, stent in place





CXR-- no acute pathology





HOSPITAL COURSE:





Date of Admission:03/28/18





Date of Discharge: 04/05/18








67 y/o F with PMH HTN, HLD, DM, CAD s/p stent, Alzheimer's dz, NHL s/p chemo (

2013; currently on no agents), recent sepsis 2/2 blocked L ureteral stent (

[facility]), s/p L ureteral stent placement (1/2018), urinary 

incontinence, who presented to the ED c/o worsening hematuria x 1 week. She had 

outpatient positive urine cx, and was placed on PO cipro (1 wk course), and 

moxi (3 wk course) by Dr. Blackmon (ID). However, pt's hematuria continued, with 

its frequency increased. Patient found to have GBS urine culture in patient. 

Patient treated with IV clindamycin x 1 week. Repeat bcx negative. Patient's 

trazadone held 2/2 to prolonged qtc. Will hold on d/c. Patient will be sent 

home with close f/u with urology and pcp. PATIENT WILL NEED REPEAT UA AND C/S 

IN 1 WEEK.


Minutes to complete discharge: 45





Discharge Summary


Reason For Visit: UTI


Current Active Problems





MAUREEN (acute kidney injury) (Acute)


UPJ obstruction, congenital (Acute)


UTI (urinary tract infection) (Acute)


Yeast infection of the vagina (Acute)


Alzheimer disease (Chronic)


CAD (coronary artery disease) (Chronic)


Diabetes mellitus (Chronic)


HLD (hyperlipidemia) (Chronic)


HTN (hypertension) (Chronic)








Condition: Stable





- Instructions


Diet, Activity, Other Instructions: 


You were in the hospital for a urinary tract infection. You were treated with 

IV antibiotics and completed the course. You also had your stent removed.





Please follow up with your primary care doctor and your urologist within 1 week.





Continue your home medications.





Do not take the trazadone. Call your neurologist and ask them about other 

options.





If you have chest pain, shortness of breath, or any new/worsening symptoms 

please come back to the hospital immediately.


Referrals: 


Precious Ziegler [Primary Care Provider] - 


Marlo Quick MD [Staff Physician] - 


Disposition: VNS/HOME HEALTH CARE





- Home Medications


Comprehensive Discharge Medication List: 


Ambulatory Orders





Metformin HCl 1,000 mg PO BIDAC 09/25/14 


Metoprolol Tartrate [Lopressor -] 50 mg PO DAILY 09/25/14 


Pravastatin Sodium 10 mg PO HS 03/28/18 


Amlodipine Besylate [Norvasc -] 5 mg PO DAILY 03/29/18 


Donepezil HCl 10 mg PO HS 03/29/18 


Insulin Glargine,Hum.rec.anlog [Lantus Solostar] 35 unit SQ DAILY 03/29/18 


Pantoprazole Sodium [Protonix] 40 mg PO DAILY 03/29/18 


Zolpidem Tartrate [Ambien] 10 mg PO HS 03/29/18 








This patient is new to me today: No


Emergency Visit: Yes


ED Registration Date: 03/28/18


Care time: The patient presented to the Emergency Department on the above date 

and was hospitalized for further evaluation of their emergent condition.


Critical Care patient: No





- Discharge Referral


Referred to [facility] No

## 2018-04-29 ENCOUNTER — HOSPITAL ENCOUNTER (INPATIENT)
Dept: HOSPITAL 74 - JER | Age: 68
LOS: 5 days | Discharge: HOME HEALTH | DRG: 872 | End: 2018-05-04
Attending: NURSE PRACTITIONER | Admitting: INTERNAL MEDICINE
Payer: COMMERCIAL

## 2018-04-29 VITALS — BODY MASS INDEX: 23.7 KG/M2

## 2018-04-29 DIAGNOSIS — E87.6: ICD-10-CM

## 2018-04-29 DIAGNOSIS — Z16.12: ICD-10-CM

## 2018-04-29 DIAGNOSIS — F02.80: ICD-10-CM

## 2018-04-29 DIAGNOSIS — Z95.5: ICD-10-CM

## 2018-04-29 DIAGNOSIS — B96.1: ICD-10-CM

## 2018-04-29 DIAGNOSIS — I10: ICD-10-CM

## 2018-04-29 DIAGNOSIS — I25.10: ICD-10-CM

## 2018-04-29 DIAGNOSIS — E83.42: ICD-10-CM

## 2018-04-29 DIAGNOSIS — I44.4: ICD-10-CM

## 2018-04-29 DIAGNOSIS — Z79.84: ICD-10-CM

## 2018-04-29 DIAGNOSIS — N39.0: ICD-10-CM

## 2018-04-29 DIAGNOSIS — Z85.72: ICD-10-CM

## 2018-04-29 DIAGNOSIS — N10: ICD-10-CM

## 2018-04-29 DIAGNOSIS — Z79.4: ICD-10-CM

## 2018-04-29 DIAGNOSIS — Z87.891: ICD-10-CM

## 2018-04-29 DIAGNOSIS — E11.9: ICD-10-CM

## 2018-04-29 DIAGNOSIS — G30.9: ICD-10-CM

## 2018-04-29 DIAGNOSIS — R32: ICD-10-CM

## 2018-04-29 DIAGNOSIS — A41.9: Primary | ICD-10-CM

## 2018-04-29 DIAGNOSIS — E78.5: ICD-10-CM

## 2018-04-29 LAB
ALBUMIN SERPL-MCNC: 3.6 G/DL (ref 3.4–5)
ALP SERPL-CCNC: 80 U/L (ref 45–117)
ALT SERPL-CCNC: 19 U/L (ref 12–78)
ANION GAP SERPL CALC-SCNC: 9 MMOL/L (ref 8–16)
APPEARANCE UR: (no result)
APTT BLD: 30.1 SECONDS (ref 26.9–34.4)
AST SERPL-CCNC: 18 U/L (ref 15–37)
BACTERIA #/AREA URNS HPF: (no result) /HPF
BASOPHILS # BLD: 0.3 % (ref 0–2)
BILIRUB SERPL-MCNC: 0.6 MG/DL (ref 0.2–1)
BILIRUB UR STRIP.AUTO-MCNC: NEGATIVE MG/DL
BUN SERPL-MCNC: 26 MG/DL (ref 7–18)
CALCIUM SERPL-MCNC: 8.6 MG/DL (ref 8.5–10.1)
CHLORIDE SERPL-SCNC: 102 MMOL/L (ref 98–107)
CO2 SERPL-SCNC: 21 MMOL/L (ref 21–32)
COLOR UR: (no result)
CREAT SERPL-MCNC: 0.9 MG/DL (ref 0.55–1.02)
DEPRECATED RDW RBC AUTO: 16 % (ref 11.6–15.6)
EOSINOPHIL # BLD: 0 % (ref 0–4.5)
EPITH CASTS URNS QL MICRO: (no result) /HPF
GLUCOSE SERPL-MCNC: 282 MG/DL (ref 74–106)
HCT VFR BLD CALC: 34.4 % (ref 32.4–45.2)
HGB BLD-MCNC: 12 GM/DL (ref 10.7–15.3)
HYALINE CASTS URNS QL MICRO: 10 /LPF
INR BLD: 1.01 (ref 0.82–1.09)
KETONES UR QL STRIP: (no result)
LEUKOCYTE ESTERASE UR QL STRIP.AUTO: NEGATIVE
LYMPHOCYTES # BLD: 6.6 % (ref 8–40)
MCH RBC QN AUTO: 28.3 PG (ref 25.7–33.7)
MCHC RBC AUTO-ENTMCNC: 35 G/DL (ref 32–36)
MCV RBC: 80.9 FL (ref 80–96)
MONOCYTES # BLD AUTO: 6.3 % (ref 3.8–10.2)
MUCOUS THREADS URNS QL MICRO: (no result)
NEUTROPHILS # BLD: 86.8 % (ref 42.8–82.8)
NITRITE UR QL STRIP: NEGATIVE
PH BLDV: 7.41 [PH] (ref 7.32–7.42)
PH UR: 5 [PH] (ref 5–8)
PLATELET # BLD AUTO: 169 K/MM3 (ref 134–434)
PMV BLD: 8.2 FL (ref 7.5–11.1)
POTASSIUM SERPLBLD-SCNC: 4.3 MMOL/L (ref 3.5–5.1)
PROT SERPL-MCNC: 7.3 G/DL (ref 6.4–8.2)
PROT UR QL STRIP: (no result)
PROT UR QL STRIP: (no result)
PT PNL PPP: 11.4 SEC (ref 9.7–13)
RBC # BLD AUTO: 4.25 M/MM3 (ref 3.6–5.2)
SODIUM SERPL-SCNC: 132 MMOL/L (ref 136–145)
SP GR UR: 1.01 (ref 1–1.03)
UROBILINOGEN UR STRIP-MCNC: NEGATIVE MG/DL (ref 0.2–1)
VENOUS PC02: 35.4 MMHG (ref 38–52)
VENOUS PO2: 20.7 MMHG (ref 28–48)
WBC # BLD AUTO: 17.3 K/MM3 (ref 4–10)

## 2018-04-29 PROCEDURE — G0378 HOSPITAL OBSERVATION PER HR: HCPCS

## 2018-04-29 PROCEDURE — C1751 CATH, INF, PER/CENT/MIDLINE: HCPCS

## 2018-04-29 RX ADMIN — OSELTAMIVIR PHOSPHATE SCH MG: 75 CAPSULE ORAL at 22:55

## 2018-04-29 NOTE — HP
CHIEF COMPLAINT: Fevers





PCP: Dr Benavides





HISTORY OF PRESENT ILLNESS:





69yo F with PMHx of recurrent UTIs, Incontinence (urine/stool), Alzheimers 

Dementia (nonverbal) presented to the ER with fevers and chills today. Daughter 

checked rectal temp, noted high temp. Brought to ER due to complicated UTI hx. 

Of note, she also had direct contact w/ family member w/ +Influenza A, though 

rapid flu in ER was negative. 





Has complicated hx recurrent UTIs. Treated at [facility] 4 months ago 

for urosepsis, found to have significant L ureteral dilation, stent was placed. 

Subsequent routine UCx remained + for GBS, failing PO regimens such as cipro 

and moxi. She was recently admitted in [facility] in March, continued to have +GBS in 

urine. she was treated w/ IV Azactam/Clinda and L ureteral stent was removed. 

Ucx at discharge showed clearance of GBS, minor contaminant Coag neg Staph. A 

week ago, daughter received call saying her routine outpatient urine check 

revealed +UCx with different organism (?name). Dr Quick sent prescription 

for Bactrim BID, she is currently on day 6/10 when the fevers/chills started. 





In the ER, she was febrile Tm 103.6, tachy to 110s. Sepsis protocol was 

initiated, patient has significant WBC without LA. She was given Gentamicin and 

IV Tylenol which brought down her fever and HR. UA showed moderate bacteria w/o 

LE, nitrite. UCx sent.











Recent Travel: Denies





PAST MEDICAL HISTORY: HTN, HLD, IDDM, CAD (s/p cardiac stent), Alzheimer's 

Dementia, NHL (s/p chemo in 2013, currently in remission)





PAST SURGICAL HISTORY: L ureteral stent placement and removal, cardiac stent, 

lung biopsy, breast biopsy





Social History: 


Smoking: Past +50 pack year smoker, not current


Alcohol: Denies


Drugs: Denies








Allergies: Penicillins Allergy (Verified 04/29/18 18:50)


 








HOME MEDICATIONS:


 Home Medications











 Medication  Instructions  Recorded


 


Metformin HCl 1,000 mg PO BIDAC 09/25/14


 


Metoprolol Tartrate [Lopressor -] 50 mg PO DAILY 09/25/14


 


Pravastatin Sodium 10 mg PO HS 03/28/18


 


Amlodipine Besylate [Norvasc -] 5 mg PO DAILY 03/29/18


 


Donepezil HCl 10 mg PO HS 03/29/18


 


Insulin Glargine,Hum.rec.anlog 35 unit SQ DAILY 03/29/18





[Lantus Solostar]  


 


Pantoprazole Sodium [Protonix] 40 mg PO DAILY 03/29/18


 


Zolpidem Tartrate [Ambien] 10 mg PO HS 03/29/18








REVIEW OF SYSTEMS


Unable to obtain 2/2 dementia








PHYSICAL EXAMINATION


 


 Vital Signs











 Period  Temp  Pulse  Resp  BP Sys/Silver  Pulse Ox


 


 Last 24 Hr  101.1 F-103.6 F    14  140-180/79-86  97








GEN: Awake, alert, nonverbal


HEENT: PERRLA, extraoccular motions are symmetric, no JVD


CV: S1, S2, RRR


LUNG: Grossly clear, difficult to obtain due to patient cooperation


ABD: Soft, NT, ND


MSK: No edema, no erythema, no flank tenderness


NEURO: Difficult to perform. Movements are symmetric, no facial droop








ASSESSMENT/PLAN:


69yo F with PMHx of recurrent UTIs, Incontinence (urine/stool), Alzheimers 

Dementia (nonverbal) presented to the ER with fevers and chills today, found to 

be septic.





# Sepsis


-- Likely secondary to UTI in light of history. Await UCx. BCx. Received a dose 

of Gent/Vanco. Discussed w/ night attending, will hold off on scheduled abx for 

now. ID and Uro consult for abx reccs.  Cannot r/o flu even w/ negative flu 

swab since patient had direct exposure, will empirically treat w/ Tamiflu 75BID 

x 5 days. PRN tylenol. 





# Alzheimers Dementia


-- Continue Aricept and Trazodone





# IDDM


-- Resume Lantus tmrw due to poor PO intake today. BGM and ISS ACHS. Diabetic 

soft diet. A1C in am





# HTN


-- Continue norvasc, metoprolol





# HLD


-- Continue home statin 





# CAD


-- Patient has cardiac history w/ stent, not on ASA, ?reason, discuss with 

family in AM





# FEN/PPx


-- No IVF, diabetic soft diet, HSQ TID





# Dispo


-- Admit to med/surg





Case d.w Dr Weeks & Dr Derrick Cantu MD - pgY1 Night Intern











Visit type





- Emergency Visit


Emergency Visit: Yes


ED Registration Date: 04/29/18


Care time: The patient presented to the Emergency Department on the above date 

and was hospitalized for further evaluation of their emergent condition.





- New Patient


This patient is new to me today: Yes


Date on this admission: 04/30/18





- Critical Care


Critical Care patient: No





Hospitalist Screening





- Colonoscopy Questionnaire


Colonoscopy Questionnaire: 





Colonoscopy Questionnaire








-   Patient:


50 - 75 years old and never had a screening colonoscopy: Unknown


History of colon or rectal polyps, or CA: Unknown


History of IBD, Crohn's disease or UC: Unknown


History of abdominal radiation therapy as a child: Unknown





-   Relative:


1 with colon or rectal CA, or polyps at age 60 or younger: Unknown


Colon or rectal CA diagnosed at age 45 or younger: Unknown


Multiple relatives with colon or rectal CA: Unknown





-   Outcome:


Screening Result: Negative Screen

## 2018-04-29 NOTE — PN
Teaching Attending Note


Name of Resident: Mich Cantu





ATTENDING PHYSICIAN STATEMENT





I saw and evaluated the patient.


I reviewed the resident's note and discussed the case with the resident.


I agree with the resident's findings and plan as documented.








SUBJECTIVE:


68 F with pmhx. of HTN, HLD, DM, CAD s/p Stent, Alzheimer's dz, NHL s/p chemo, 

prior Sepsis due to blocked L. ureteral stent (1/18), urinary incontinance.  

Brought in by her daughter who took her temperature and noticed it was 103.6. 

States she was recently rx'd Bactrim by her urologist. Also, notes recent 

exposure to flu. Pt. is non-verbal at baseline, but family members have noticed 

decreased po intake and fevers. No N,V,D. No cough, chest pain or pressure. No 

shortness of breath








OBJECTIVE:


Physical:


VS:


 Vital Signs











 Period  Temp  Pulse  Resp  BP Sys/Silver  Pulse Ox


 


 Last 24 Hr  101.1 F-103.6 F    14  140-180/79-86  97








GEN: NAD, Resting in bed, AA0x3


HEENT: NCAT, PERRL, Throat without exudates


CARD: RRR S1, S2


RESP: CTAB


ABD: BSx4, NTD to palpation


EXT: - C/C/E





 CBCD











WBC  17.3 K/mm3 (4.0-10.0)  H D 04/29/18  19:00    


 


RBC  4.25 M/mm3 (3.60-5.2)   04/29/18  19:00    


 


Hgb  12.0 GM/dL (10.7-15.3)   04/29/18  19:00    


 


Hct  34.4 % (32.4-45.2)   04/29/18  19:00    


 


MCV  80.9 fl (80-96)   04/29/18  19:00    


 


MCHC  35.0 g/dl (32.0-36.0)   04/29/18  19:00    


 


RDW  16.0 % (11.6-15.6)  H  04/29/18  19:00    


 


Plt Count  169 K/MM3 (134-434)   04/29/18  19:00    


 


MPV  8.2 fl (7.5-11.1)   04/29/18  19:00    








 CMP











Sodium  132 mmol/L (136-145)  L  04/29/18  19:00    


 


Potassium  4.3 mmol/L (3.5-5.1)   04/29/18  19:00    


 


Chloride  102 mmol/L ()   04/29/18  19:00    


 


Carbon Dioxide  21 mmol/L (21-32)   04/29/18  19:00    


 


Anion Gap  9  (8-16)   04/29/18  19:00    


 


BUN  26 mg/dL (7-18)  H  04/29/18  19:00    


 


Creatinine  0.9 mg/dL (0.55-1.02)   04/29/18  19:00    


 


Creat Clearance w eGFR  > 60  (>60)   04/29/18  19:00    


 


Random Glucose  282 mg/dL ()  H  04/29/18  19:00    


 


Calcium  8.6 mg/dL (8.5-10.1)   04/29/18  19:00    


 


Total Bilirubin  0.6 mg/dL (0.2-1.0)   04/29/18  19:00    


 


AST  18 U/L (15-37)   04/29/18  19:00    


 


ALT  19 U/L (12-78)   04/29/18  19:00    


 


Alkaline Phosphatase  80 U/L ()   04/29/18  19:00    


 


Total Protein  7.3 g/dl (6.4-8.2)   04/29/18  19:00    


 


Albumin  3.6 g/dl (3.4-5.0)   04/29/18  19:00    








 CARDIAC ENZYMES











Troponin I  < 0.02 ng/ml (0.00-0.05)   04/29/18  19:00    








 Urine Test Results











Urine Color  Ltyellow   04/29/18  19:15    


 


Urine Appearance  Slcloudy   04/29/18  19:15    


 


Urine pH  5.0  (5.0-8.0)   04/29/18  19:15    


 


Ur Specific Gravity  1.014  (1.001-1.035)   04/29/18  19:15    


 


Urine Protein  2+  (NEGATIVE)  H  04/29/18  19:15    


 


Urine Glucose (UA)  3+  (NEGATIVE)  H  04/29/18  19:15    


 


Urine Ketones  Trace  (NEGATIVE)  H  04/29/18  19:15    


 


Urine Blood  Negative  (NEGATIVE)   04/29/18  19:15    


 


Urine Nitrite  Negative  (NEGATIVE)   04/29/18  19:15    


 


Urine Bilirubin  Negative  (<2.0 mg/dL)   04/29/18  19:15    


 


Ur Leukocyte Esterase  Negative  (NEGATIVE)   04/29/18  19:15    


 


Ur Epithelial Cells  Rare /HPF (FEW)   04/29/18  19:15    


 


Urine Bacteria  Moderate /hpf (NONE SEEN)   04/29/18  19:15    


 


Urine Mucus  Rare   04/29/18  19:15    








CXR: No acute proces- pre-hyatt read, ? left base, but rotated


EKG: NSR, QtC 465





 Home Medications











 Medication  Instructions  Recorded


 


Metformin HCl 1,000 mg PO BIDAC 09/25/14


 


Metoprolol Tartrate [Lopressor -] 50 mg PO DAILY 09/25/14


 


Pravastatin Sodium 10 mg PO HS 03/28/18


 


Amlodipine Besylate [Norvasc -] 5 mg PO DAILY 03/29/18


 


Donepezil HCl 10 mg PO HS 03/29/18


 


Insulin Glargine,Hum.rec.anlog 35 unit SQ DAILY 03/29/18





[Lantus Solostar]  


 


Pantoprazole Sodium [Protonix] 40 mg PO DAILY 03/29/18


 


Zolpidem Tartrate [Ambien] 10 mg PO HS 03/29/18














ASSESSMENT AND PLAN:





68 F with pmhx. of HTN, HLD, DM, CAD s/p Stent, Alzheimer's dz, NHL s/p chemo, 

who presents with sepsis





1.) Sepsis


- ? Flu Vs. UTI


- [place]-Flu


- Flu PCR


- U Cx, Blood Cx


- Repeat LA


- IVF


- S/P Gentamycin in ED, wuld add Vanc


- ID consult, very mild UTI here





2.) DM


- FS


- RAISS


- DM Diet


- Levemir- Poor Po intake, hold dose





3.) CAD


- Lopressor


- Unknown Why pt. is not on ASA, Restart


- Statin


- Restart ACE/ARB in am





4.) HLD


- C/W Statin





5.) Alzheimer's Dementia


- C/W Donezpil





5.) Dvt PPx


- Heparin 5000 q8





Med-Sx

## 2018-04-29 NOTE — PDOC
History of Present Illness





- General


Stated Complaint: HIGH BLOOD SUGAR,ALTERED MENTAL STATUS


Time Seen by Provider: 04/29/18 19:12


History Source: Family


Exam Limitations: Dementia





- History of Present Illness


Initial Comments: 





04/29/18 19:54


Patient is a 68 year old female with PMHx HTN, HLD, DM, CAD s/p stent, Alzheimer

's dz, NHL s/p chemo (2013; currently on no agents), recent sepsis 2/2 blocked 

L ureteral stent ([facility]), s/p L ureteral stent placement (1/2018), 

urinary incontinence  brought by daughter for symptoms of possible sepsis. 

Patient's daughter states that mother was shaking and unable to stand, she took 

her temp rectally and was 103.6.  She was concerned because the last time her 

mother had a high temperature in Dec., she was admitted for sepsis. Her 

urologist prescribed patient Bactrim since Tuesday (2x/day) since something in 

her urine came back positive. Incidentally, brother in law was admitted 

yesterday  with Influenza A and mother was exposed to him.  History was 

provided by patients son/daughter. Patient is nonverbal at baseline. No nausea, 

vomit, no complaints of pain





PMD: Dr. Ziegler


Urologist: Dr. Cervantes





ROS limited due to health status


GENERAL/CONSTITUTIONAL: (+)fever or chills. (+) weakness. No weight change.]


HEAD, EYES, EARS, NOSE AND THROAT: [No change in vision. No ear pain or 

discharge. No sore throat.]


CARDIOVASCULAR: (-) shortness of breath.]


RESPIRATORY: [No cough, wheezing, or hemoptysis.]


GASTROINTESTINAL: [No nausea, vomiting, diarrhea or constipation. No rectal 

bleeding.]


GENITOURINARY: (+) UTI


MUSCULOSKELETAL: [No joint or muscle swelling or pain. No neck or back pain.]


SKIN AND BREASTS: (+) rash (-) easy bruising.]


NEUROLOGIC: (+) dementia


PSYCHIATRIC: [No depression or anxiety.]


ENDOCRINE: [No increased thirst. No abnormal weight change.]


HEMATOLOGIC/LYMPHATIC: [No anemia, easy bleeding, or history of blood clots.]


ALLERGIC/IMMUNOLOGIC: [No hives or skin allergy. No latex allergy.]





GENERAL: [The patient is awake, alert, oriented x1, in no acute distress.]


HEAD: [Normal with no signs of trauma.]


EYES: [Pupils equal, round and reactive to light, extraocular movements intact, 

sclera anicteric, conjunctiva clear.]


ENT: [Ears normal, nares patent,   Moist mucous membranes.]


NECK: [Normal range of motion, supple without lymphadenopathy, JVD, or masses.]


LUNGS: [Breath sounds equal, clear to auscultation bilaterally.  No wheezes, 

and no crackles.]


HEART: [Regular rate and rhythm, normal S1 and S2 without murmur, rub.]


ABDOMEN: [Soft, nontender, normoactive bowel sounds.  No guarding, no rebound.  

No masses.]


EXTREMITIES: [Normal range of motion, no edema.  No clubbing or cyanosis. No 

cords, erythema, or tenderness.]


NEUROLOGICAL: [Cranial nerves II through XII grossly intact.  nonverbal, gait 

not tested.]


PSYCH: [Normal mood, normal affect.]


SKIN: (+) Warmth, Dry, normal turgor, (+) macular irregular shaped rashes under 

left breast, 














Past History





- Past Medical History


Allergies/Adverse Reactions: 


 Allergies











Allergy/AdvReac Type Severity Reaction Status Date / Time


 


Penicillins Allergy   Verified 04/29/18 18:50











Home Medications: 


Ambulatory Orders





Metformin HCl 1,000 mg PO BIDAC 09/25/14 


Metoprolol Tartrate [Lopressor -] 50 mg PO DAILY 09/25/14 


Pravastatin Sodium 10 mg PO HS 03/28/18 


Amlodipine Besylate [Norvasc -] 5 mg PO DAILY 03/29/18 


Donepezil HCl 10 mg PO HS 03/29/18 


Insulin Glargine,Hum.rec.anlog [Lantus Solostar] 35 unit SQ DAILY 03/29/18 


Pantoprazole Sodium [Protonix] 40 mg PO DAILY 03/29/18 


Zolpidem Tartrate [Ambien] 10 mg PO HS 03/29/18 








Cancer: Yes (NON-HODGKINS)


COPD: No


Dementia: Yes


Diabetes: Yes


Hypercholesterolemia: Yes





- Surgical History


Abdominal Surgery: Yes (Kidney stent)





- Suicide/Smoking/Psychosocial Hx


Smoking History: Former smoker


Have you smoked in the past 12 months: Yes


Number of Cigarettes Smoked Daily: 0


If you are a former smoker, when did you quit?: 6 months ago


Information on smoking cessation initiated: No


'Breaking Loose' booklet given: 03/29/18


Hx Alcohol Use: No


Drug/Substance Use Hx: No


Substance Use Type: None





*Physical Exam





- Vital Signs


 Last Vital Signs











Temp Pulse Resp BP Pulse Ox


 


 103.6 F H  113 H  14   180/79   97 


 


 04/29/18 18:30  04/29/18 18:30  04/29/18 18:30  04/29/18 18:30  04/29/18 18:30














ED Treatment Course





- LABORATORY


CBC & Chemistry Diagram: 


 04/29/18 19:00





 04/29/18 19:00





- ADDITIONAL ORDERS


Additional order review: 


 Laboratory  Results











  04/29/18 04/29/18





  Unknown 19:00


 


VBG pH  7.41 


 


POC VBG pCO2  35.4 L 


 


POC VBG pO2  20.7 L 


 


Mixed VBG HCO3  21.8 


 


Sodium   Cancelled


 


Potassium   Cancelled


 


Chloride   Cancelled


 


Carbon Dioxide   Cancelled


 


Anion Gap   Cancelled


 


BUN   Cancelled


 


Creatinine   Cancelled


 


Creat Clearance w eGFR   Cancelled


 


Random Glucose   Cancelled


 


Calcium   Cancelled


 


Total Bilirubin   Cancelled


 


AST   Cancelled


 


ALT   Cancelled


 


Alkaline Phosphatase   Cancelled


 


Total Protein   Cancelled


 


Albumin   Cancelled








 











  04/29/18





  19:00


 


RBC  4.25


 


MCV  80.9


 


MCHC  35.0


 


RDW  16.0 H


 


MPV  8.2


 


Neutrophils %  86.8 H D


 


Lymphocytes %  6.6 L D


 


Monocytes %  6.3


 


Eosinophils %  0.0  D


 


Basophils %  0.3














Medical Decision Making





- Medical Decision Making





04/29/18 19:26


Patient is a 68 year old female with PMHx HTN, HLD, DM, CAD s/p stent, Alzheimer

's dz, NHL s/p chemo (2013; currently on no agents), recent sepsis 2/2 blocked 

L ureteral stent ([facility]), s/p L ureteral stent placement (1/2018), 

urinary incontinence  brought by daughter for symptoms of possible sepsis.


Sepsis workup initiated, labs, cultures and IVF


Patient has received Tylenol for fever








04/29/18 20:27


Labs reviewed noted to have a wbc 17 but lactic 1.9





04/29/18 20:31


EKG: Sinus tach 118,  LAD, left ant fascicular block, LVH. 





Chest x-ray: A rotated unable to comment on the left costophrenic angle, 

otherwise no acute infiltrate.








04/29/18 20:36


 Selected Entries











  04/29/18





  20:25


 


Pulse Rate [ 95 H





Apical] 


 


Blood Pressure 140/86





[Left Arm] 

















04/29/18 21:32


UA cloudy with wbc 10 


Will give Gentamycin 80mg IV 





D/W dr. Meza will admit to OBS 


consult placed to Dr Liang (ID), Dr. Quick (Uro) 











*DC/Admit/Observation/Transfer


Diagnosis at time of Disposition: 


 Systemic inflammatory response syndrome (SIRS)





UTI (urinary tract infection)


Qualifiers:


 Urinary tract infection type: site unspecified Hematuria presence: without 

hematuria Qualified Code(s): N39.0 - Urinary tract infection, site not specified








- Discharge Dispostion


Admit: Yes





- Referrals





- Patient Instructions





- Post Discharge Activity

## 2018-04-29 NOTE — PDOC
History of Present Illness





- General


History Source: Family


Exam Limitations: Dementia





<Kimi Bolaños - Last Filed: 04/29/18 19:11>





- History of Present Illness


Initial Comments: 





04/29/18 19:17


67 y/o F with PMH HTN, HLD, DM, CAD s/p stent, Alzheimer's dz, NHL s/p chemo (

2013; currently on no agents), recent sepsis 2/2 blocked L ureteral stent (

[facility]), s/p L ureteral stent placement (1/2018), urinary 

incontinence  who presents today for possible sepsis. Patients daughter states 

that she took her mothers temp rectally today with a temp of 103.6. She states 

that the last time her mother had a high temperature in Dec., she was admitted 

for sepsis. Her urologist prescribed patient Bactrim since Tuesday (2x/day) 

since something in her urine came back positive. Patients brother in law was 

admitted yesterday  with Influenza A. History was provided by patients son/

daughter. Patient is nonverbal at baseline. No nausea, vomit, no complaints of 

pain





Urologist: Dr. Cervantes





<Matilda Olivarez - Last Filed: 04/29/18 19:17>





<Ruth Hewitt - Last Filed: 04/30/18 04:21>





- General


Chief Complaint: SIRS, Suspected/Possible


Stated Complaint: HIGH BLOOD SUGAR,ALTERED MENTAL STATUS





Past History





- Past Medical History


Cancer: Yes (NON-[facility]GKINS)


COPD: No


Dementia: Yes


Diabetes: Yes


Hypercholesterolemia: Yes





- Surgical History


Abdominal Surgery: Yes (Kidney stent)





- Suicide/Smoking/Psychosocial Hx


Smoking History: Former smoker


Have you smoked in the past 12 months: Yes


Number of Cigarettes Smoked Daily: 0


If you are a former smoker, when did you quit?: 6 months ago


Information on smoking cessation initiated: No


'Breaking Loose' booklet given: 03/29/18


Hx Alcohol Use: No


Drug/Substance Use Hx: No


Substance Use Type: None





<Kimi Bolaños - Last Filed: 04/29/18 19:11>





<Matilda Olivarez - Last Filed: 04/29/18 19:17>





<Ruth Hewitt - Last Filed: 04/30/18 04:21>





- Past Medical History


Allergies/Adverse Reactions: 


 Allergies











Allergy/AdvReac Type Severity Reaction Status Date / Time


 


Penicillins Allergy   Verified 04/29/18 18:50











Home Medications: 


Ambulatory Orders





Metformin HCl 1,000 mg PO BIDAC 09/25/14 


Metoprolol Tartrate [Lopressor -] 50 mg PO DAILY 09/25/14 


Pravastatin Sodium 10 mg PO HS 03/28/18 


Amlodipine Besylate [Norvasc -] 5 mg PO DAILY 03/29/18 


Donepezil HCl 10 mg PO HS 03/29/18 


Insulin Glargine,Hum.rec.anlog [Lantus Solostar] 35 unit SQ DAILY 03/29/18 


Pantoprazole Sodium [Protonix] 40 mg PO DAILY 03/29/18 


Zolpidem Tartrate [Ambien] 10 mg PO HS 03/29/18 











**Review of Systems





- Review of Systems


Able to Perform ROS?: No (advanced Alzheimer's)





<Matilda Olivarez - Last Filed: 04/29/18 19:17>





*Physical Exam





- Vital Signs


 Last Vital Signs











Temp Pulse Resp BP Pulse Ox


 


 103.6 F H  113 H  14   180/79   97 


 


 04/29/18 18:30  04/29/18 18:30  04/29/18 18:30  04/29/18 18:30  04/29/18 18:30














<Kimi Bolaños - Last Filed: 04/29/18 19:11>





- Vital Signs


 Last Vital Signs











Temp Pulse Resp BP Pulse Ox


 


 103.6 F H  113 H  14   180/79   97 


 


 04/29/18 18:30  04/29/18 18:30  04/29/18 18:30  04/29/18 18:30  04/29/18 18:30














<Matilda Olivarez - Last Filed: 04/29/18 19:17>





- Vital Signs


 Last Vital Signs











Temp Pulse Resp BP Pulse Ox


 


 101.1 F H  88   18   128/58   96 


 


 04/30/18 03:11  04/30/18 03:11  04/30/18 03:11  04/30/18 03:11  04/30/18 03:00














<Ruth Hewitt - Last Filed: 04/30/18 04:21>





ED Treatment Course





- LABORATORY


CBC & Chemistry Diagram: 


 04/29/18 19:00





 04/29/18 19:00





- RADIOLOGY


Radiology Studies Ordered: 














 Category Date Time Status


 


 CHEST X-RAY PORTABLE* [RAD] Stat Radiology  04/29/18 19:05 Ordered














<Kimi Bolaños - Last Filed: 04/29/18 19:11>





- LABORATORY


CBC & Chemistry Diagram: 


 04/29/18 19:00





 04/29/18 19:00





- ADDITIONAL ORDERS


Additional order review: 


 











  04/29/18





  19:00


 


RBC  4.25


 


MCV  80.9


 


MCHC  35.0


 


RDW  16.0 H


 


MPV  8.2


 


Neutrophils %  86.8 H D


 


Lymphocytes %  6.6 L D


 


Monocytes %  6.3


 


Eosinophils %  0.0  D


 


Basophils %  0.3














<JuanisMatilda - Last Filed: 04/29/18 19:17>





- LABORATORY


CBC & Chemistry Diagram: 


 04/29/18 19:00





 04/29/18 19:00





- ADDITIONAL ORDERS


Additional order review: 


 Laboratory  Results











  04/29/18 04/29/18 04/29/18





  21:42 19:15 19:00


 


PT with INR   


 


INR   


 


PTT (Actin FS)   


 


Sodium    132 L


 


Potassium    4.3


 


Chloride    102


 


Carbon Dioxide    21


 


Anion Gap    9


 


BUN    26 H


 


Creatinine    0.9


 


Creat Clearance w eGFR    > 60


 


Random Glucose    282 H


 


Lactic Acid  1.7  


 


Calcium    8.6


 


Total Bilirubin    0.6


 


AST    18


 


ALT    19


 


Alkaline Phosphatase    80


 


Troponin I   


 


Total Protein    7.3


 


Albumin    3.6


 


Urine Color   Ltyellow 


 


Urine Appearance   Slcloudy 


 


Urine pH   5.0 


 


Ur Specific Gravity   1.014 


 


Urine Protein   2+ H 


 


Urine Glucose (UA)   3+ H 


 


Urine Ketones   Trace H 


 


Urine Blood   Negative 


 


Urine Nitrite   Negative 


 


Urine Bilirubin   Negative 


 


Urine Urobilinogen   Negative 


 


Ur Leukocyte Esterase   Negative 


 


Urine WBC (Auto)   10 


 


Urine RBC (Auto)   1 


 


Ur Epithelial Cells   Rare 


 


Urine Bacteria   Moderate 


 


Hyaline Casts   10 


 


Urine Mucus   Rare 














  04/29/18 04/29/18 04/29/18





  19:00 19:00 19:00


 


PT with INR    11.40


 


INR    1.01


 


PTT (Actin FS)    30.1


 


Sodium   


 


Potassium   


 


Chloride   


 


Carbon Dioxide   


 


Anion Gap   


 


BUN   


 


Creatinine   


 


Creat Clearance w eGFR   


 


Random Glucose   


 


Lactic Acid   1.9 


 


Calcium   


 


Total Bilirubin   


 


AST   


 


ALT   


 


Alkaline Phosphatase   


 


Troponin I  < 0.02  


 


Total Protein   


 


Albumin   


 


Urine Color   


 


Urine Appearance   


 


Urine pH   


 


Ur Specific Gravity   


 


Urine Protein   


 


Urine Glucose (UA)   


 


Urine Ketones   


 


Urine Blood   


 


Urine Nitrite   


 


Urine Bilirubin   


 


Urine Urobilinogen   


 


Ur Leukocyte Esterase   


 


Urine WBC (Auto)   


 


Urine RBC (Auto)   


 


Ur Epithelial Cells   


 


Urine Bacteria   


 


Hyaline Casts   


 


Urine Mucus   














  04/29/18





  19:00


 


PT with INR 


 


INR 


 


PTT (Actin FS) 


 


Sodium  Cancelled


 


Potassium  Cancelled


 


Chloride  Cancelled


 


Carbon Dioxide  Cancelled


 


Anion Gap  Cancelled


 


BUN  Cancelled


 


Creatinine  Cancelled


 


Creat Clearance w eGFR  Cancelled


 


Random Glucose  Cancelled


 


Lactic Acid 


 


Calcium  Cancelled


 


Total Bilirubin  Cancelled


 


AST  Cancelled


 


ALT  Cancelled


 


Alkaline Phosphatase  Cancelled


 


Troponin I 


 


Total Protein  Cancelled


 


Albumin  Cancelled


 


Urine Color 


 


Urine Appearance 


 


Urine pH 


 


Ur Specific Gravity 


 


Urine Protein 


 


Urine Glucose (UA) 


 


Urine Ketones 


 


Urine Blood 


 


Urine Nitrite 


 


Urine Bilirubin 


 


Urine Urobilinogen 


 


Ur Leukocyte Esterase 


 


Urine WBC (Auto) 


 


Urine RBC (Auto) 


 


Ur Epithelial Cells 


 


Urine Bacteria 


 


Hyaline Casts 


 


Urine Mucus 








 











  04/29/18





  19:00


 


RBC  4.25


 


MCV  80.9


 


MCHC  35.0


 


RDW  16.0 H


 


MPV  8.2


 


Neutrophils %  86.8 H D


 


Lymphocytes %  6.6 L D


 


Monocytes %  6.3


 


Eosinophils %  0.0  D


 


Basophils %  0.3














- Medications


Given in the ED: 


ED Medications














Discontinued Medications














Generic Name Dose Route Start Last Admin





  Trade Name Freq  PRN Reason Stop Dose Admin


 


Acetaminophen  1,000 mg  04/30/18 01:22  04/30/18 01:31





  Ofirmev Injection -  IVPB  04/30/18 01:23  1,000 mg





  ONCE ONE   Administration


 


Sodium Chloride  1,000 mls @ 1,000 mls/hr  04/29/18 19:05  04/29/18 19:26





  Normal Saline -  IV  04/29/18 20:04  1,000 mls/hr





  ASDIR STA   Administration


 


Gentamicin Sulfate 80 mg/  252 mls @ 250 mls/hr  04/29/18 21:30  04/29/18 21:41





  Dextrose  IVPB   250 mls/hr





  ONCE FESTUS   Administration


 


Vancomycin HCl 1 mg/ Dextrose  250 mls @ 166.667 mls/hr  04/29/18 22:45  04/29/ 18 22:55





  IVPB  04/30/18 00:14  166.667 mls/hr





  ONCE ONE   Administration


 


Gentamicin Sulfate 80 mg/  252 mls @ 250 mls/hr  04/29/18 22:29  04/29/18 23:44





  Dextrose  IVPB  04/29/18 22:30  Not Given





  ONCE ONE   


 


Trazodone HCl  50 mg  04/30/18 01:21  04/30/18 01:31





  Desyrel -  PO  04/30/18 01:22  50 mg





  ONCE ONE   Administration














<Ruth Hewitt - Last Filed: 04/30/18 04:21>





Medical Decision Making





- Medical Decision Making





04/29/18 19:11


67 yo F with h/o dementia ( advanced alhzheimers nonverbal) ureteral stent 

forobstruction ( removed ) , sepsis, cad htn dm here with family for fever and 

shaking.  started today 103.  no n/v. no change to urine no change to stool. 

history provided by family at bedside. brother in law admitted with influenza 

B. 





differential sepsis from uti, flu, pna, other intrabdominal infection. plan 

labs ua sepsis workup. flu swab. 





<Kimi Bolaños - Last Filed: 04/29/18 19:11>





- Medical Decision Making





04/30/18 04:19


Pt seen by the PA. Pt worked up, influenza negative; fever; lactic acid normal.

   She will be admitted for fever and UTI, despite cureent [id number] therapy.. 

Admit to medicine obs for IV gentamycin and eval by urology and ID.





<Ruth Hewitt - Last Filed: 04/30/18 04:21>





*DC/Admit/Observation/Transfer





<Kimi Bolañso - Last Filed: 04/29/18 19:11>





<Matilda Olivarez - Last Filed: 04/29/18 19:17>





<Ruth Hewitt - Last Filed: 04/30/18 04:21>


Diagnosis at time of Disposition: 


 Systemic inflammatory response syndrome (SIRS), UTI (urinary tract infection)

## 2018-04-30 LAB
ALBUMIN SERPL-MCNC: 3.2 G/DL (ref 3.4–5)
ALP SERPL-CCNC: 73 U/L (ref 45–117)
ALT SERPL-CCNC: 17 U/L (ref 12–78)
ANION GAP SERPL CALC-SCNC: 8 MMOL/L (ref 8–16)
AST SERPL-CCNC: 9 U/L (ref 15–37)
BASOPHILS # BLD: 0.2 % (ref 0–2)
BILIRUB SERPL-MCNC: 0.9 MG/DL (ref 0.2–1)
BUN SERPL-MCNC: 16 MG/DL (ref 7–18)
CALCIUM SERPL-MCNC: 8.7 MG/DL (ref 8.5–10.1)
CHLORIDE SERPL-SCNC: 105 MMOL/L (ref 98–107)
CO2 SERPL-SCNC: 24 MMOL/L (ref 21–32)
CREAT SERPL-MCNC: 0.8 MG/DL (ref 0.55–1.02)
DEPRECATED RDW RBC AUTO: 16.3 % (ref 11.6–15.6)
EOSINOPHIL # BLD: 0.1 % (ref 0–4.5)
GLUCOSE SERPL-MCNC: 268 MG/DL (ref 74–106)
HCT VFR BLD CALC: 33.8 % (ref 32.4–45.2)
HGB BLD-MCNC: 11.4 GM/DL (ref 10.7–15.3)
LYMPHOCYTES # BLD: 8.4 % (ref 8–40)
MCH RBC QN AUTO: 27.3 PG (ref 25.7–33.7)
MCHC RBC AUTO-ENTMCNC: 33.6 G/DL (ref 32–36)
MCV RBC: 81.2 FL (ref 80–96)
MONOCYTES # BLD AUTO: 6.8 % (ref 3.8–10.2)
NEUTROPHILS # BLD: 84.5 % (ref 42.8–82.8)
PLATELET # BLD AUTO: 147 K/MM3 (ref 134–434)
PMV BLD: 8 FL (ref 7.5–11.1)
POTASSIUM SERPLBLD-SCNC: 4 MMOL/L (ref 3.5–5.1)
PROT SERPL-MCNC: 6.5 G/DL (ref 6.4–8.2)
RBC # BLD AUTO: 4.17 M/MM3 (ref 3.6–5.2)
SODIUM SERPL-SCNC: 137 MMOL/L (ref 136–145)
WBC # BLD AUTO: 12.9 K/MM3 (ref 4–10)

## 2018-04-30 RX ADMIN — TRAZODONE HYDROCHLORIDE SCH MG: 50 TABLET ORAL at 22:03

## 2018-04-30 RX ADMIN — PANTOPRAZOLE SODIUM SCH MG: 40 TABLET, DELAYED RELEASE ORAL at 10:29

## 2018-04-30 RX ADMIN — DONEPEZIL HYDROCHLORIDE SCH MG: 10 TABLET, FILM COATED ORAL at 22:03

## 2018-04-30 RX ADMIN — AMLODIPINE BESYLATE SCH MG: 5 TABLET ORAL at 10:29

## 2018-04-30 RX ADMIN — AZTREONAM SCH MLS/HR: 1 INJECTION, POWDER, LYOPHILIZED, FOR SOLUTION INTRAMUSCULAR; INTRAVENOUS at 21:53

## 2018-04-30 RX ADMIN — HEPARIN SODIUM SCH UNIT: 5000 INJECTION, SOLUTION INTRAVENOUS; SUBCUTANEOUS at 06:12

## 2018-04-30 RX ADMIN — INSULIN ASPART SCH UNITS: 100 INJECTION, SOLUTION INTRAVENOUS; SUBCUTANEOUS at 12:29

## 2018-04-30 RX ADMIN — SODIUM CHLORIDE SCH MLS/HR: 9 INJECTION, SOLUTION INTRAVENOUS at 20:10

## 2018-04-30 RX ADMIN — ACETAMINOPHEN PRN MG: 325 TABLET ORAL at 11:01

## 2018-04-30 RX ADMIN — HEPARIN SODIUM SCH UNIT: 5000 INJECTION, SOLUTION INTRAVENOUS; SUBCUTANEOUS at 13:46

## 2018-04-30 RX ADMIN — INSULIN DETEMIR SCH UNIT: 100 INJECTION, SOLUTION SUBCUTANEOUS at 22:04

## 2018-04-30 RX ADMIN — OSELTAMIVIR PHOSPHATE SCH MG: 75 CAPSULE ORAL at 10:28

## 2018-04-30 RX ADMIN — ATORVASTATIN CALCIUM SCH MG: 10 TABLET, FILM COATED ORAL at 22:03

## 2018-04-30 RX ADMIN — METOPROLOL TARTRATE SCH MG: 50 TABLET, FILM COATED ORAL at 10:29

## 2018-04-30 RX ADMIN — INSULIN ASPART SCH UNITS: 100 INJECTION, SOLUTION INTRAVENOUS; SUBCUTANEOUS at 18:02

## 2018-04-30 RX ADMIN — INSULIN ASPART SCH UNITS: 100 INJECTION, SOLUTION INTRAVENOUS; SUBCUTANEOUS at 06:11

## 2018-04-30 RX ADMIN — ACETAMINOPHEN PRN MG: 325 TABLET ORAL at 20:27

## 2018-04-30 RX ADMIN — HEPARIN SODIUM SCH UNIT: 5000 INJECTION, SOLUTION INTRAVENOUS; SUBCUTANEOUS at 22:03

## 2018-04-30 RX ADMIN — INSULIN ASPART SCH UNITS: 100 INJECTION, SOLUTION INTRAVENOUS; SUBCUTANEOUS at 22:08

## 2018-04-30 NOTE — PN
Physical Exam: 


SUBJECTIVE: Patient seen and examined at bedside. Several adult children present

, patient being fed by her son.








OBJECTIVE:





 Vital Signs











 Period  Temp  Pulse  Resp  BP Sys/Silver  Pulse Ox


 


 Last 24 Hr  98.3 F-103.6 F    14-20  115-180/58-93  96-97











GENERAL: The patient is awake, alert, non-verbal at baseline. Makes eye contact.


LUNGS: Breath sounds equal, clear to auscultation bilaterally


HEART: Regular rate and rhythm, S1, S2 


ABDOMEN: Soft, nontender, nondistended, normoactive bowel sounds


EXTREMITIES: 2+ pulses, warm, well-perfused, no edema. 

















 Laboratory Results - last 24 hr











  04/29/18 04/29/18 04/29/18





  19:00 19:00 19:00


 


WBC  17.3 H D  


 


RBC  4.25  


 


Hgb  12.0  


 


Hct  34.4  


 


MCV  80.9  


 


MCH  28.3  


 


MCHC  35.0  


 


RDW  16.0 H  


 


Plt Count  169  


 


MPV  8.2  


 


Neutrophils %  86.8 H D  


 


Lymphocytes %  6.6 L D  


 


Monocytes %  6.3  


 


Eosinophils %  0.0  D  


 


Basophils %  0.3  


 


PT with INR    11.40


 


INR    1.01


 


PTT (Actin FS)    30.1


 


VBG pH   


 


POC VBG pCO2   


 


POC VBG pO2   


 


Mixed VBG HCO3   


 


Sodium   Cancelled 


 


Potassium   Cancelled 


 


Chloride   Cancelled 


 


Carbon Dioxide   Cancelled 


 


Anion Gap   Cancelled 


 


BUN   Cancelled 


 


Creatinine   Cancelled 


 


Creat Clearance w eGFR   Cancelled 


 


POC Glucometer   


 


Random Glucose   Cancelled 


 


Hemoglobin A1c %   


 


Lactic Acid   


 


Calcium   Cancelled 


 


Total Bilirubin   Cancelled 


 


AST   Cancelled 


 


ALT   Cancelled 


 


Alkaline Phosphatase   Cancelled 


 


Troponin I   


 


Total Protein   Cancelled 


 


Albumin   Cancelled 


 


Urine Color   


 


Urine Appearance   


 


Urine pH   


 


Ur Specific Gravity   


 


Urine Protein   


 


Urine Glucose (UA)   


 


Urine Ketones   


 


Urine Blood   


 


Urine Nitrite   


 


Urine Bilirubin   


 


Urine Urobilinogen   


 


Ur Leukocyte Esterase   


 


Urine WBC (Auto)   


 


Urine RBC (Auto)   


 


Ur Epithelial Cells   


 


Urine Bacteria   


 


Hyaline Casts   


 


Urine Mucus   














  04/29/18 04/29/18 04/29/18





  19:00 19:00 19:00


 


WBC   


 


RBC   


 


Hgb   


 


Hct   


 


MCV   


 


MCH   


 


MCHC   


 


RDW   


 


Plt Count   


 


MPV   


 


Neutrophils %   


 


Lymphocytes %   


 


Monocytes %   


 


Eosinophils %   


 


Basophils %   


 


PT with INR   


 


INR   


 


PTT (Actin FS)   


 


VBG pH   


 


POC VBG pCO2   


 


POC VBG pO2   


 


Mixed VBG HCO3   


 


Sodium    132 L


 


Potassium    4.3


 


Chloride    102


 


Carbon Dioxide    21


 


Anion Gap    9


 


BUN    26 H


 


Creatinine    0.9


 


Creat Clearance w eGFR    > 60


 


POC Glucometer   


 


Random Glucose    282 H


 


Hemoglobin A1c %   


 


Lactic Acid  1.9  


 


Calcium    8.6


 


Total Bilirubin    0.6


 


AST    18


 


ALT    19


 


Alkaline Phosphatase    80


 


Troponin I   < 0.02 


 


Total Protein    7.3


 


Albumin    3.6


 


Urine Color   


 


Urine Appearance   


 


Urine pH   


 


Ur Specific Gravity   


 


Urine Protein   


 


Urine Glucose (UA)   


 


Urine Ketones   


 


Urine Blood   


 


Urine Nitrite   


 


Urine Bilirubin   


 


Urine Urobilinogen   


 


Ur Leukocyte Esterase   


 


Urine WBC (Auto)   


 


Urine RBC (Auto)   


 


Ur Epithelial Cells   


 


Urine Bacteria   


 


Hyaline Casts   


 


Urine Mucus   














  04/29/18 04/29/18 04/29/18





  19:15 21:42 23:06


 


WBC   


 


RBC   


 


Hgb   


 


Hct   


 


MCV   


 


MCH   


 


MCHC   


 


RDW   


 


Plt Count   


 


MPV   


 


Neutrophils %   


 


Lymphocytes %   


 


Monocytes %   


 


Eosinophils %   


 


Basophils %   


 


PT with INR   


 


INR   


 


PTT (Actin FS)   


 


VBG pH   


 


POC VBG pCO2   


 


POC VBG pO2   


 


Mixed VBG HCO3   


 


Sodium   


 


Potassium   


 


Chloride   


 


Carbon Dioxide   


 


Anion Gap   


 


BUN   


 


Creatinine   


 


Creat Clearance w eGFR   


 


POC Glucometer    [phone]


 


Random Glucose   


 


Hemoglobin A1c %   


 


Lactic Acid   1.7 


 


Calcium   


 


Total Bilirubin   


 


AST   


 


ALT   


 


Alkaline Phosphatase   


 


Troponin I   


 


Total Protein   


 


Albumin   


 


Urine Color  Ltyellow  


 


Urine Appearance  Slcloudy  


 


Urine pH  5.0  


 


Ur Specific Gravity  1.014  


 


Urine Protein  2+ H  


 


Urine Glucose (UA)  3+ H  


 


Urine Ketones  Trace H  


 


Urine Blood  Negative  


 


Urine Nitrite  Negative  


 


Urine Bilirubin  Negative  


 


Urine Urobilinogen  Negative  


 


Ur Leukocyte Esterase  Negative  


 


Urine WBC (Auto)  10  


 


Urine RBC (Auto)  1  


 


Ur Epithelial Cells  Rare  


 


Urine Bacteria  Moderate  


 


Hyaline Casts  10  


 


Urine Mucus  Rare  














  04/29/18 04/30/18 04/30/18





  Unknown 06:10 06:35


 


WBC    12.9 H


 


RBC    4.17


 


Hgb    11.4


 


Hct    33.8


 


MCV    81.2


 


MCH    27.3


 


MCHC    33.6


 


RDW    16.3 H


 


Plt Count    147


 


MPV    8.0


 


Neutrophils %    84.5 H


 


Lymphocytes %    8.4  D


 


Monocytes %    6.8


 


Eosinophils %    0.1  D


 


Basophils %    0.2


 


PT with INR   


 


INR   


 


PTT (Actin FS)   


 


VBG pH  7.41  


 


POC VBG pCO2  35.4 L  


 


POC VBG pO2  20.7 L  


 


Mixed VBG HCO3  21.8  


 


Sodium   


 


Potassium   


 


Chloride   


 


Carbon Dioxide   


 


Anion Gap   


 


BUN   


 


Creatinine   


 


Creat Clearance w eGFR   


 


POC Glucometer   264 


 


Random Glucose   


 


Hemoglobin A1c %   


 


Lactic Acid   


 


Calcium   


 


Total Bilirubin   


 


AST   


 


ALT   


 


Alkaline Phosphatase   


 


Troponin I   


 


Total Protein   


 


Albumin   


 


Urine Color   


 


Urine Appearance   


 


Urine pH   


 


Ur Specific Gravity   


 


Urine Protein   


 


Urine Glucose (UA)   


 


Urine Ketones   


 


Urine Blood   


 


Urine Nitrite   


 


Urine Bilirubin   


 


Urine Urobilinogen   


 


Ur Leukocyte Esterase   


 


Urine WBC (Auto)   


 


Urine RBC (Auto)   


 


Ur Epithelial Cells   


 


Urine Bacteria   


 


Hyaline Casts   


 


Urine Mucus   














  04/30/18 04/30/18





  06:35 06:35


 


WBC  


 


RBC  


 


Hgb  


 


Hct  


 


MCV  


 


MCH  


 


MCHC  


 


RDW  


 


Plt Count  


 


MPV  


 


Neutrophils %  


 


Lymphocytes %  


 


Monocytes %  


 


Eosinophils %  


 


Basophils %  


 


PT with INR  


 


INR  


 


PTT (Actin FS)  


 


VBG pH  


 


POC VBG pCO2  


 


POC VBG pO2  


 


Mixed VBG HCO3  


 


Sodium  137 


 


Potassium  4.0 


 


Chloride  105 


 


Carbon Dioxide  24 


 


Anion Gap  8 


 


BUN  16 


 


Creatinine  0.8 


 


Creat Clearance w eGFR  > 60 


 


POC Glucometer  


 


Random Glucose  268 H 


 


Hemoglobin A1c %   8.9 H


 


Lactic Acid  


 


Calcium  8.7 


 


Total Bilirubin  0.9  D 


 


AST  9 L 


 


ALT  17 


 


Alkaline Phosphatase  73 


 


Troponin I  


 


Total Protein  6.5 


 


Albumin  3.2 L 


 


Urine Color  


 


Urine Appearance  


 


Urine pH  


 


Ur Specific Gravity  


 


Urine Protein  


 


Urine Glucose (UA)  


 


Urine Ketones  


 


Urine Blood  


 


Urine Nitrite  


 


Urine Bilirubin  


 


Urine Urobilinogen  


 


Ur Leukocyte Esterase  


 


Urine WBC (Auto)  


 


Urine RBC (Auto)  


 


Ur Epithelial Cells  


 


Urine Bacteria  


 


Hyaline Casts  


 


Urine Mucus  








                  Active Medications











Generic Name Dose Route Start Last Admin





  Trade Name Freq  PRN Reason Stop Dose Admin


 


Acetaminophen  650 mg  04/30/18 03:37  04/30/18 11:01





  Tylenol -  PO   650 mg





  Q4H PRN   Administration





  FEVER   


 


Amlodipine Besylate  5 mg  04/30/18 10:00  04/30/18 10:29





  Norvasc -  PO   5 mg





  DAILY FESTUS   Administration


 


Atorvastatin Calcium  10 mg  04/30/18 22:00  





  Lipitor -  PO   





  HS [facility]   


 


Donepezil HCl  10 mg  04/30/18 22:00  





  Aricept -  PO   





  HS [facility]   


 


Heparin Sodium (Porcine)  5,000 unit  04/30/18 06:00  04/30/18 06:12





  Heparin -  SQ   5,000 unit





  TID FESTUS   Administration


 


Vancomycin HCl  1 gm in 200 mls @ 133.333 mls/hr  04/30/18 23:00  





  Vancomycin 1 Gm Premix -  IVPB   





  Q12H [facility]   





  Protocol   


 


Insulin Aspart  1 vial  04/30/18 07:00  04/30/18 12:29





  Novolog Vial Sliding Scale -  SQ   10 units





  [facility]   Administration





  Protocol   


 


Insulin Detemir  35 units  04/30/18 22:00  





  Levemir Vial  SQ   





  HS [facility]   


 


Metoprolol Tartrate  50 mg  04/30/18 10:00  04/30/18 10:29





  Lopressor -  PO   50 mg





  DAILY FESTUS   Administration


 


Oseltamivir Phosphate  75 mg  04/29/18 23:00  04/30/18 10:28





  Tamiflu -  PO  05/04/18 22:59  75 mg





  BID FESTUS   Administration


 


Pantoprazole Sodium  40 mg  04/30/18 10:00  04/30/18 10:29





  Protonix -  PO   40 mg





  DAILY FESTUS   Administration


 


Trazodone HCl  50 mg  04/30/18 22:00  





  Desyrel -  PO   





  HS [facility]   











ASSESSMENT/PLAN


68 year-old female with a PMH of HTN, HLD, CAD s/p stent, non-Hodgkins lymphoma 

(2012 in remission), dementia (nonverbal), left UPJ obstruction s/p stent and 

stent removal, with recurrent UTIs on Bactrim for the past week. 





Sepsis secondary to UTI


   --T 103.6, p 113, WBC 17.3 on admission with pyuria


   --recent urine cultures on 3/28 and 4/1 grew Strep B and Staph coag neg


   --start Vanc


   --Dr. Blackmon following for ID





Left UPJ obstruction s/p stent placement 2017 ([facility]) s/p stent removal 4/3/

18 ([facility])


   --US kidneys, ureters, bladder pending





Influenza exposure


   --close relative hospitalized for influenza A


   --treat with full course Tamiflu





Hypertension


   --BP stable


   --continue metoprolol, amlodipine


   


Hyperlipidemia


   --continue Lipitor





Coronary artery disease


   --continue lopressor, amlodipine, Lipitor





Non-Hodgkins lymphoma


   --stable





Dementia


   --at baseline per family





FEN


   Fluids: NS @ 83mL/hr


   Electrolytes: replete as indicated


   Nutrition: soft, diabetic diet





DVT prophylaxis: subq heparin





Physical therapy





Dispo: continues to require inpatient care. Full code.




















   














Visit type





- Emergency Visit


Emergency Visit: Yes


ED Registration Date: 04/29/18


Care time: The patient presented to the Emergency Department on the above date 

and was hospitalized for further evaluation of their emergent condition.





- New Patient


This patient is new to me today: Yes


Date on this admission: 04/30/18





- Critical Care


Critical Care patient: No

## 2018-04-30 NOTE — CON.ID
Consult


Consult Specialty:: Infectious Diseases


Reason for Consultation:: Fever





- History of Present Illness


Chief Complaint: Fever


History of Present Illness: 





The patient is a 68 yr old female with Hx dementia who was found to have fever 

yesterday.


Her daughter noted intermittent chills and took her T which was 103.0 She was 

brought rick the ED, where w/up (+) leukocytosis.


She received VAnco, Gent and was empirically started on Tamiflu.





- History Source


History Provided By: Family Member


Limitations to Obtaining History: Dementia





- Past Medical History


CNS: Yes: Dementia


Cardio/Vascular: Yes: HTN, Hyperlipdemia


Renal/: Yes: UTI, Other (UPJ obstruction)





- Alcohol/Substance Use


Hx Alcohol Use: No


History of Substance Use: reports: None





- Smoking History


Smoking history: Former smoker


Have you smoked in the past 12 months: Yes


Aproximately how many cigarettes per day: 0


If you are a former smoker, when did you quit?: 6 months ago





- Social History


Usual Living Arrangement: With Child


ADL: Independent


History of Recent Travel: No





Home Medications





- Allergies


Allergies/Adverse Reactions: 


 Allergies











Allergy/AdvReac Type Severity Reaction Status Date / Time


 


Penicillins Allergy   Verified 04/29/18 18:50














- Home Medications


Home Medications: 


Ambulatory Orders





Metformin HCl 1,000 mg PO BIDAC 09/25/14 


Metoprolol Tartrate [Lopressor -] 50 mg PO DAILY 09/25/14 


Pravastatin Sodium 10 mg PO HS 03/28/18 


Amlodipine Besylate [Norvasc -] 5 mg PO DAILY 03/29/18 


Donepezil HCl 10 mg PO HS 03/29/18 


Insulin Glargine,Hum.rec.anlog [Lantus Solostar] 35 unit SQ DAILY 03/29/18 


Pantoprazole Sodium [Protonix] 40 mg PO DAILY 03/29/18 


Zolpidem Tartrate [Ambien] 10 mg PO HS 03/29/18 











Family Disease History





- Family Disease History


Family History: Unable to Obtain





Review of Systems





- Review of Systems


Constitutional: reports: Chills, Fever


HENT: denies: Nasal Congestion


Cardiovascular: denies: Chest Pain


Respiratory: denies: Cough


Gastrointestinal: reports: No Symptoms





Physical Exam


Vital Signs: 


 Vital Signs











Temperature  103 F H  04/30/18 20:38


 


Pulse Rate  98 H  04/30/18 09:00


 


Respiratory Rate  18   04/30/18 13:30


 


Blood Pressure  118/51   04/30/18 13:30


 


O2 Sat by Pulse Oximetry (%)  96   04/30/18 09:00











Constitutional: Yes: Well Nourished, No Distress, Calm


Eyes: Yes: Conjunctiva Clear


Neck: Yes: Trachea Midline


Cardiovascular: Yes: Regular Rate and Rhythm


Respiratory: Yes: CTA Bilaterally


Gastrointestinal: Yes: Normal Bowel Sounds, Soft.  No: Tenderness, Epigastrium, 

Tenderness, Rebound


Labs: 


 CBC, BMP





 04/30/18 06:35 





 04/30/18 06:35 











Imaging





- Results


Chest X-ray: Report Reviewed, Image Reviewed





Assessment/Plan


Patient with fever, leukocytosis


Hx Strep UTI, infected stent.





Funes c/s


DC TAmiflu - No URI symptoms


Add Aztreonam 1 gr q 8


Await c/s


IF fevers persist, consider CT C/A/P

## 2018-04-30 NOTE — EKG
Test Reason : 

Blood Pressure : ***/*** mmHG

Vent. Rate : 118 BPM     Atrial Rate : 118 BPM

   P-R Int : 166 ms          QRS Dur : 100 ms

    QT Int : 332 ms       P-R-T Axes : 072 -50 085 degrees

   QTc Int : 465 ms

 

SINUS TACHYCARDIA

POSSIBLE LEFT ATRIAL ENLARGEMENT

LEFT ANTERIOR FASCICULAR BLOCK

LEFT VENTRICULAR HYPERTROPHY WITH REPOLARIZATION ABNORMALITY

ABNORMAL ECG

WHEN COMPARED WITH ECG OF 05-APR-2018 11:25,

VENT. RATE HAS INCREASED BY  53 BPM

MINIMAL CRITERIA FOR SEPTAL INFARCT ARE NO LONGER PRESENT

T WAVE INVERSION LESS EVIDENT IN LATERAL LEADS

Confirmed by CAITLIN MONSALVE MD ([id number]) on 4/30/2018 12:22:20 PM

 

Referred By:             Confirmed By:CAITLIN MONSALVE MD

## 2018-05-01 LAB
ALBUMIN SERPL-MCNC: 2.7 G/DL (ref 3.4–5)
ALP SERPL-CCNC: 68 U/L (ref 45–117)
ALT SERPL-CCNC: 15 U/L (ref 12–78)
ANION GAP SERPL CALC-SCNC: 5 MMOL/L (ref 8–16)
AST SERPL-CCNC: 12 U/L (ref 15–37)
BILIRUB SERPL-MCNC: 0.5 MG/DL (ref 0.2–1)
BUN SERPL-MCNC: 9 MG/DL (ref 7–18)
CALCIUM SERPL-MCNC: 8 MG/DL (ref 8.5–10.1)
CHLORIDE SERPL-SCNC: 111 MMOL/L (ref 98–107)
CO2 SERPL-SCNC: 25 MMOL/L (ref 21–32)
CREAT SERPL-MCNC: 0.6 MG/DL (ref 0.55–1.02)
DEPRECATED RDW RBC AUTO: 16.4 % (ref 11.6–15.6)
GLUCOSE SERPL-MCNC: 71 MG/DL (ref 74–106)
HCT VFR BLD CALC: 32.7 % (ref 32.4–45.2)
HGB BLD-MCNC: 11.2 GM/DL (ref 10.7–15.3)
MAGNESIUM SERPL-MCNC: 1.7 MG/DL (ref 1.8–2.4)
MCH RBC QN AUTO: 27.5 PG (ref 25.7–33.7)
MCHC RBC AUTO-ENTMCNC: 34.2 G/DL (ref 32–36)
MCV RBC: 80.5 FL (ref 80–96)
PLATELET BLD QL SMEAR: ADEQUATE
PMV BLD: 8.1 FL (ref 7.5–11.1)
POTASSIUM SERPLBLD-SCNC: 3.3 MMOL/L (ref 3.5–5.1)
PROT SERPL-MCNC: 6.2 G/DL (ref 6.4–8.2)
RBC # BLD AUTO: 4.06 M/MM3 (ref 3.6–5.2)
SODIUM SERPL-SCNC: 141 MMOL/L (ref 136–145)
WBC # BLD AUTO: 11.3 K/MM3 (ref 4–10)

## 2018-05-01 RX ADMIN — TRAZODONE HYDROCHLORIDE SCH MG: 50 TABLET ORAL at 21:20

## 2018-05-01 RX ADMIN — ATORVASTATIN CALCIUM SCH MG: 10 TABLET, FILM COATED ORAL at 21:20

## 2018-05-01 RX ADMIN — METOPROLOL TARTRATE SCH MG: 50 TABLET, FILM COATED ORAL at 10:12

## 2018-05-01 RX ADMIN — INSULIN ASPART SCH UNITS: 100 INJECTION, SOLUTION INTRAVENOUS; SUBCUTANEOUS at 18:14

## 2018-05-01 RX ADMIN — INSULIN DETEMIR SCH UNIT: 100 INJECTION, SOLUTION SUBCUTANEOUS at 21:20

## 2018-05-01 RX ADMIN — HEPARIN SODIUM SCH UNIT: 5000 INJECTION, SOLUTION INTRAVENOUS; SUBCUTANEOUS at 21:20

## 2018-05-01 RX ADMIN — AZTREONAM SCH MLS/HR: 1 INJECTION, POWDER, LYOPHILIZED, FOR SOLUTION INTRAMUSCULAR; INTRAVENOUS at 01:32

## 2018-05-01 RX ADMIN — HEPARIN SODIUM SCH UNIT: 5000 INJECTION, SOLUTION INTRAVENOUS; SUBCUTANEOUS at 13:19

## 2018-05-01 RX ADMIN — INSULIN ASPART SCH UNITS: 100 INJECTION, SOLUTION INTRAVENOUS; SUBCUTANEOUS at 21:20

## 2018-05-01 RX ADMIN — SODIUM CHLORIDE SCH MLS/HR: 9 INJECTION, SOLUTION INTRAVENOUS at 13:21

## 2018-05-01 RX ADMIN — POTASSIUM CHLORIDE SCH MEQ: 1500 TABLET, EXTENDED RELEASE ORAL at 17:30

## 2018-05-01 RX ADMIN — AMLODIPINE BESYLATE SCH MG: 5 TABLET ORAL at 10:13

## 2018-05-01 RX ADMIN — INSULIN ASPART SCH: 100 INJECTION, SOLUTION INTRAVENOUS; SUBCUTANEOUS at 06:27

## 2018-05-01 RX ADMIN — HEPARIN SODIUM SCH UNIT: 5000 INJECTION, SOLUTION INTRAVENOUS; SUBCUTANEOUS at 06:27

## 2018-05-01 RX ADMIN — AZTREONAM SCH MLS/HR: 1 INJECTION, POWDER, LYOPHILIZED, FOR SOLUTION INTRAMUSCULAR; INTRAVENOUS at 17:29

## 2018-05-01 RX ADMIN — VANCOMYCIN HYDROCHLORIDE SCH MLS/HR: 1 INJECTION, SOLUTION INTRAVENOUS at 17:46

## 2018-05-01 RX ADMIN — PANTOPRAZOLE SODIUM SCH MG: 40 TABLET, DELAYED RELEASE ORAL at 10:12

## 2018-05-01 RX ADMIN — AZTREONAM SCH MLS/HR: 1 INJECTION, POWDER, LYOPHILIZED, FOR SOLUTION INTRAMUSCULAR; INTRAVENOUS at 10:13

## 2018-05-01 RX ADMIN — INSULIN ASPART SCH UNITS: 100 INJECTION, SOLUTION INTRAVENOUS; SUBCUTANEOUS at 12:58

## 2018-05-01 RX ADMIN — POTASSIUM CHLORIDE SCH MEQ: 1500 TABLET, EXTENDED RELEASE ORAL at 12:58

## 2018-05-01 RX ADMIN — DONEPEZIL HYDROCHLORIDE SCH MG: 10 TABLET, FILM COATED ORAL at 21:20

## 2018-05-01 RX ADMIN — MAGNESIUM SULFATE IN DEXTROSE SCH GM: 10 INJECTION, SOLUTION INTRAVENOUS at 12:58

## 2018-05-01 RX ADMIN — MAGNESIUM SULFATE IN DEXTROSE SCH GM: 10 INJECTION, SOLUTION INTRAVENOUS at 14:29

## 2018-05-01 NOTE — PN
Physical Exam: 


SUBJECTIVE: Patient seen and examined in sunroom. Ambulating with two adult 

children and physical therapist. 








OBJECTIVE:





 Vital Signs











 Period  Temp  Pulse  Resp  BP Sys/Silver  Pulse Ox


 


 Last 24 Hr  98.9 F-103 F  83-98  18-18  118-147/51-75  96-96














GENERAL: The patient is awake, non-verbal at baseline. Withdraws from physical 

exam, does not like to be touched, holds on to children.


LUNGS: Breath sounds CTA


HEART: Regular rate and rhythm, S1, S2 


EXTREMITIES: 2+ pulses, warm, well-perfused, no edema. 


NEUROLOGICAL: Cranial nerves II through XII grossly intact





 Laboratory Results - last 24 hr











  04/30/18 04/30/18 04/30/18





  06:35 06:35 06:35


 


WBC  12.9 H  


 


RBC  4.17  


 


Hgb  11.4  


 


Hct  33.8  


 


MCV  81.2  


 


MCH  27.3  


 


MCHC  33.6  


 


RDW  16.3 H  


 


Plt Count  147  


 


MPV  8.0  


 


Neutrophils %  84.5 H  


 


Lymphocytes %  8.4  D  


 


Monocytes %  6.8  


 


Eosinophils %  0.1  D  


 


Basophils %  0.2  


 


Sodium   137 


 


Potassium   4.0 


 


Chloride   105 


 


Carbon Dioxide   24 


 


Anion Gap   8 


 


BUN   16 


 


Creatinine   0.8 


 


Creat Clearance w eGFR   > 60 


 


POC Glucometer   


 


Random Glucose   268 H 


 


Hemoglobin A1c %    8.9 H


 


Calcium   8.7 


 


Total Bilirubin   0.9  D 


 


AST   9 L 


 


ALT   17 


 


Alkaline Phosphatase   73 


 


Total Protein   6.5 


 


Albumin   3.2 L 














  04/30/18 04/30/18 04/30/18





  12:28 17:18 22:08


 


WBC   


 


RBC   


 


Hgb   


 


Hct   


 


MCV   


 


MCH   


 


MCHC   


 


RDW   


 


Plt Count   


 


MPV   


 


Neutrophils %   


 


Lymphocytes %   


 


Monocytes %   


 


Eosinophils %   


 


Basophils %   


 


Sodium   


 


Potassium   


 


Chloride   


 


Carbon Dioxide   


 


Anion Gap   


 


BUN   


 


Creatinine   


 


Creat Clearance w eGFR   


 


POC Glucometer  376  187  305


 


Random Glucose   


 


Hemoglobin A1c %   


 


Calcium   


 


Total Bilirubin   


 


AST   


 


ALT   


 


Alkaline Phosphatase   


 


Total Protein   


 


Albumin   














  05/01/18





  05:45


 


WBC 


 


RBC 


 


Hgb 


 


Hct 


 


MCV 


 


MCH 


 


MCHC 


 


RDW 


 


Plt Count 


 


MPV 


 


Neutrophils % 


 


Lymphocytes % 


 


Monocytes % 


 


Eosinophils % 


 


Basophils % 


 


Sodium 


 


Potassium 


 


Chloride 


 


Carbon Dioxide 


 


Anion Gap 


 


BUN 


 


Creatinine 


 


Creat Clearance w eGFR 


 


POC Glucometer  105


 


Random Glucose 


 


Hemoglobin A1c % 


 


Calcium 


 


Total Bilirubin 


 


AST 


 


ALT 


 


Alkaline Phosphatase 


 


Total Protein 


 


Albumin 








                        Active Medications











Generic Name Dose Route Start Last Admin





  Trade Name Freq  PRN Reason Stop Dose Admin


 


Acetaminophen  650 mg  04/30/18 03:37  04/30/18 20:27





  Tylenol -  PO   650 mg





  Q4H PRN   Administration





  FEVER   


 


Amlodipine Besylate  5 mg  04/30/18 10:00  04/30/18 10:29





  Norvasc -  PO   5 mg





  DAILY FESTUS   Administration


 


Atorvastatin Calcium  10 mg  04/30/18 22:00  04/30/18 22:03





  Lipitor -  PO   10 mg





  HS [facility]   Administration


 


Donepezil HCl  10 mg  04/30/18 22:00  04/30/18 22:03





  Aricept -  PO   10 mg





  HS [facility]   Administration


 


Heparin Sodium (Porcine)  5,000 unit  04/30/18 06:00  05/01/18 06:27





  Heparin -  SQ   5,000 unit





  TID FESTUS   Administration


 


Vancomycin HCl  1 gm in 200 mls @ 133.333 mls/hr  04/30/18 23:00  





  Vancomycin 1 Gm Premix -  IVPB   





  Q12H [facility]   





  Protocol   


 


Sodium Chloride  1,000 mls @ 83 mls/hr  04/30/18 18:30  04/30/18 20:10





  Normal Saline -  IV   83 mls/hr





  ASDIR [facility]   Administration


 


Aztreonam 1 gm/ Dextrose  50 mls @ 100 mls/hr  04/30/18 21:00  05/01/18 01:32





  IVPB  05/01/18 23:59  100 mls/hr





  Q8H-IV FE[facility]   Administration





  Protocol   


 


Insulin Aspart  1 vial  04/30/18 07:00  05/01/18 06:27





  Novolog Vial Sliding Scale -  SQ   Not Given





  [facility]   





  Protocol   


 


Insulin Detemir  35 units  04/30/18 22:00  04/30/18 22:04





  Levemir Vial  SQ   35 unit





  HS [facility]   Administration


 


Metoprolol Tartrate  50 mg  04/30/18 10:00  04/30/18 10:29





  Lopressor -  PO   50 mg





  DAILY FESTUS   Administration


 


Pantoprazole Sodium  40 mg  04/30/18 10:00  04/30/18 10:29





  Protonix -  PO   40 mg





  DAILY FESTUS   Administration


 


Trazodone HCl  50 mg  04/30/18 22:00  04/30/18 22:03





  Desyrel -  PO   50 mg





  HS [facility]   Administration











ASSESSMENT/PLAN


68 year-old female with a PMH of HTN, HLD, CAD s/p stent, non-Hodgkins lymphoma 

(2012 in remission), dementia (nonverbal), left UPJ obstruction s/p stent and 

stent removal, with recurrent UTIs on Bactrim for the past week. 





Sepsis secondary to LFGNB UTI


   --Tm 103 Tc 98.9, WBC trending down


   --4/29 Urine culture: LFGNB


   --urine cultures on 3/28 and 4/1 grew Strep B and Staph coag neg


   --continue aztreonam (day #2) and Vanc (day #2) 


   --ID Dr. Blackmon following 





Left UPJ obstruction s/p stent placement 2017 ([facility]) s/p stent removal 4/3/

18 ([facility])


   --4/30  kidneys, bladder: interval removal of left stent, interval 

resolution of left hydro, no right hydro





Influenza exposure


   --no upper respiratory symptoms


   --stop Tamiflu per ID


   


Hypertension


   --BP stable


   --continue metoprolol, amlodipine


   


Hyperlipidemia


   --continue Lipitor





Coronary artery disease


   --continue lopressor, amlodipine, Lipitor





Non-Hodgkins lymphoma


   --stable





Dementia


   --at baseline per family





FEN


   Fluids: NS@83mL/hr


   Electrolytes: replete as indicated


   Nutrition: soft, diabetic diet





DVT prophylaxis: subq heparin





Physical therapy





Dispo: continues to require inpatient care. Full code.














Visit type





- Emergency Visit


Emergency Visit: Yes


ED Registration Date: 04/29/18


Care time: The patient presented to the Emergency Department on the above date 

and was hospitalized for further evaluation of their emergent condition.





- New Patient


This patient is new to me today: No





- Critical Care


Critical Care patient: No

## 2018-05-01 NOTE — CON.GU
Consult





- History of Present Illness


History of Present Illness: 





69 yo female with recurrent uti. Had cysto/stent placement for left upj 

obstruction. stent removed a few weeks ago. Recent urine culture pos for 

Klebsiella and placed on Bactrim. now admitted with fever, confusion. renal 

sono with resolution of left hydronephrosis, urine culture prelim positive





- Past Medical History


CNS: Yes: Dementia


Cardio/Vascular: Yes: HTN, Hyperlipdemia


Renal/: Yes: UTI, Other (UPJ obstruction)





- Alcohol/Substance Use


Hx Alcohol Use: No


History of Substance Use: reports: None





- Smoking History


Smoking history: Former smoker


Have you smoked in the past 12 months: Yes


Aproximately how many cigarettes per day: 0


If you are a former smoker, when did you quit?: 6 months ago





- Social History


Usual Living Arrangement: With Child


ADL: Independent


History of Recent Travel: No





Home Medications





- Allergies


Allergies/Adverse Reactions: 


 Allergies











Allergy/AdvReac Type Severity Reaction Status Date / Time


 


Penicillins Allergy   Verified 04/29/18 18:50














- Home Medications


Home Medications: 


Ambulatory Orders





Metformin HCl 1,000 mg PO BIDAC 09/25/14 


Metoprolol Tartrate [Lopressor -] 50 mg PO DAILY 09/25/14 


Pravastatin Sodium 10 mg PO HS 03/28/18 


Amlodipine Besylate [Norvasc -] 5 mg PO DAILY 03/29/18 


Donepezil HCl 10 mg PO HS 03/29/18 


Insulin Glargine,Hum.rec.anlog [Lantus Solostar] 35 unit SQ DAILY 03/29/18 


Pantoprazole Sodium [Protonix] 40 mg PO DAILY 03/29/18 


Zolpidem Tartrate [Ambien] 10 mg PO HS 03/29/18 











Physical Exam-


Vital Signs: 


 Vital Signs











Temperature  97.4 F L  05/01/18 18:00


 


Pulse Rate  80   05/01/18 18:00


 


Respiratory Rate  19   05/01/18 18:00


 


Blood Pressure  150/88   05/01/18 18:00


 


O2 Sat by Pulse Oximetry (%)  96   05/01/18 09:00











Labs: 


 CBC, BMP





 05/01/18 09:20 





 05/01/18 09:20 











Problem List





- Problems


(1) UTI (urinary tract infection)


Assessment/Plan: 


antibiotics per ID, will obtain CT


Code(s): N39.0 - URINARY TRACT INFECTION, SITE NOT SPECIFIED   


Qualifiers: 


   Urinary tract infection type: site unspecified   Hematuria presence: without 

hematuria   Qualified Code(s): N39.0 - Urinary tract infection, site not 

specified

## 2018-05-02 RX ADMIN — SODIUM CHLORIDE SCH: 9 INJECTION, SOLUTION INTRAVENOUS at 09:48

## 2018-05-02 RX ADMIN — ERTAPENEM SODIUM SCH MLS/HR: 1 INJECTION, POWDER, LYOPHILIZED, FOR SOLUTION INTRAMUSCULAR; INTRAVENOUS at 14:13

## 2018-05-02 RX ADMIN — DONEPEZIL HYDROCHLORIDE SCH MG: 10 TABLET, FILM COATED ORAL at 21:17

## 2018-05-02 RX ADMIN — INSULIN DETEMIR SCH UNIT: 100 INJECTION, SOLUTION SUBCUTANEOUS at 21:16

## 2018-05-02 RX ADMIN — VANCOMYCIN HYDROCHLORIDE SCH MLS/HR: 1 INJECTION, SOLUTION INTRAVENOUS at 05:08

## 2018-05-02 RX ADMIN — ATORVASTATIN CALCIUM SCH MG: 10 TABLET, FILM COATED ORAL at 21:17

## 2018-05-02 RX ADMIN — TRAZODONE HYDROCHLORIDE SCH MG: 50 TABLET ORAL at 21:17

## 2018-05-02 RX ADMIN — INSULIN ASPART SCH UNITS: 100 INJECTION, SOLUTION INTRAVENOUS; SUBCUTANEOUS at 21:16

## 2018-05-02 RX ADMIN — AMLODIPINE BESYLATE SCH MG: 5 TABLET ORAL at 09:48

## 2018-05-02 RX ADMIN — INSULIN ASPART SCH: 100 INJECTION, SOLUTION INTRAVENOUS; SUBCUTANEOUS at 06:53

## 2018-05-02 RX ADMIN — HEPARIN SODIUM SCH UNIT: 5000 INJECTION, SOLUTION INTRAVENOUS; SUBCUTANEOUS at 21:17

## 2018-05-02 RX ADMIN — INSULIN ASPART SCH UNITS: 100 INJECTION, SOLUTION INTRAVENOUS; SUBCUTANEOUS at 13:12

## 2018-05-02 RX ADMIN — PANTOPRAZOLE SODIUM SCH MG: 40 TABLET, DELAYED RELEASE ORAL at 09:48

## 2018-05-02 RX ADMIN — HEPARIN SODIUM SCH UNIT: 5000 INJECTION, SOLUTION INTRAVENOUS; SUBCUTANEOUS at 06:53

## 2018-05-02 RX ADMIN — METOPROLOL TARTRATE SCH MG: 50 TABLET, FILM COATED ORAL at 09:48

## 2018-05-02 RX ADMIN — HEPARIN SODIUM SCH UNIT: 5000 INJECTION, SOLUTION INTRAVENOUS; SUBCUTANEOUS at 13:12

## 2018-05-02 RX ADMIN — INSULIN ASPART SCH UNITS: 100 INJECTION, SOLUTION INTRAVENOUS; SUBCUTANEOUS at 18:16

## 2018-05-02 NOTE — PN
Physical Exam: 


SUBJECTIVE: Patient seen and examined oob to chair. Son and daughter present. 

Patient cries when examined but did allow exam.








OBJECTIVE:





 Vital Signs











 Period  Temp  Pulse  Resp  BP Sys/Silver  Pulse Ox


 


 Last 24 Hr  97.4 F-101.0 F  72-83  16-20  125-164/64-88  96-98











GENERAL: The patient is awake, non-verbal at baseline. 


LUNGS: Breath sounds CTA


HEART: Regular rate and rhythm, S1, S2 


EXTREMITIES: 2+ pulses, warm, well-perfused, no edema. 


NEUROLOGICAL: Cranial nerves II through XII grossly intact








 


 CBCD











WBC  11.3 K/mm3 (4.0-10.0)  H  05/01/18  09:20    


 


RBC  4.06 M/mm3 (3.60-5.2)   05/01/18  09:20    


 


Hgb  11.2 GM/dL (10.7-15.3)   05/01/18  09:20    


 


Hct  32.7 % (32.4-45.2)   05/01/18  09:20    


 


MCV  80.5 fl (80-96)   05/01/18  09:20    


 


MCHC  34.2 g/dl (32.0-36.0)   05/01/18  09:20    


 


RDW  16.4 % (11.6-15.6)  H  05/01/18  09:20    


 


Plt Count  Np   05/01/18  09:20    


 


MPV  8.1 fl (7.5-11.1)   05/01/18  09:20    








 CMP











Sodium  141 mmol/L (136-145)   05/01/18  09:20    


 


Potassium  3.3 mmol/L (3.5-5.1)  L  05/01/18  09:20    


 


Chloride  111 mmol/L ()  H  05/01/18  09:20    


 


Carbon Dioxide  25 mmol/L (21-32)   05/01/18  09:20    


 


Anion Gap  5  (8-16)  L  05/01/18  09:20    


 


BUN  9 mg/dL (7-18)   05/01/18  09:20    


 


Creatinine  0.6 mg/dL (0.55-1.02)   05/01/18  09:20    


 


Creat Clearance w eGFR  > 60  (>60)   05/01/18  09:20    


 


Calcium  8.0 mg/dL (8.5-10.1)  L  05/01/18  09:20    


 


Total Bilirubin  0.5 mg/dL (0.2-1.0)  D 05/01/18  09:20    


 


AST  12 U/L (15-37)  L  05/01/18  09:20    


 


ALT  15 U/L (12-78)   05/01/18  09:20    


 


Alkaline Phosphatase  68 U/L ()   05/01/18  09:20    


 


Total Protein  6.2 g/dl (6.4-8.2)  L  05/01/18  09:20    


 


Albumin  2.7 g/dl (3.4-5.0)  L  05/01/18  09:20    











                  Active Medications











Generic Name Dose Route Start Last Admin





  Trade Name Freq  PRN Reason Stop Dose Admin


 


Acetaminophen  650 mg  04/30/18 03:37  04/30/18 20:27





  Tylenol -  PO   650 mg





  Q4H PRN   Administration





  FEVER   


 


Amlodipine Besylate  5 mg  04/30/18 10:00  05/02/18 09:48





  Norvasc -  PO   5 mg





  DAILY FESTUS   Administration


 


Atorvastatin Calcium  10 mg  04/30/18 22:00  05/01/18 21:20





  Lipitor -  PO   10 mg





  HS FESTUS   Administration


 


Donepezil HCl  10 mg  04/30/18 22:00  05/01/18 21:20





  Aricept -  PO   10 mg





  HS FESTUS   Administration


 


Heparin Sodium (Porcine)  5,000 unit  04/30/18 06:00  05/02/18 06:53





  Heparin -  SQ   5,000 unit





  TID FESTUS   Administration


 


Sodium Chloride  1,000 mls @ 83 mls/hr  04/30/18 18:30  05/02/18 09:48





  Normal Saline -  IV   Not Given





  ASDIR [facility]   


 


Aztreonam 1 gm/ Dextrose  50 mls @ 100 mls/hr  05/02/18 14:00  





  IVPB  05/08/18 23:59  





  Q8H-IV FESTUS   





  Protocol   


 


Insulin Aspart  1 vial  04/30/18 07:00  05/02/18 06:53





  Novolog Vial Sliding Scale -  SQ   Not Given





  ACHS [facility]   





  Protocol   


 


Insulin Detemir  35 units  04/30/18 22:00  05/01/18 21:20





  Levemir Vial  SQ   35 unit





  HS FESTUS   Administration


 


Metoprolol Tartrate  50 mg  04/30/18 10:00  05/02/18 09:48





  Lopressor -  PO   50 mg





  DAILY FESTUS   Administration


 


Pantoprazole Sodium  40 mg  04/30/18 10:00  05/02/18 09:48





  Protonix -  PO   40 mg





  DAILY FESTUS   Administration


 


Trazodone HCl  50 mg  04/30/18 22:00  05/01/18 21:20





  Desyrel -  PO   50 mg





  HS FESTUS   Administration











ASSESSMENT/PLAN


68 year-old female with a PMH of HTN, HLD, CAD s/p stent, non-Hodgkins lymphoma 

(2012 in remission), dementia (nonverbal), left UPJ obstruction s/p stent and 

stent removal, with recurrent UTIs.





Sepsis secondary to Klebsiella ESBL UTI


   --Tm 101 Tc 98.9, WBC trending down


   --4/29 Urine culture: Klebsiella ESBL


   --start ertapenem (day #1); d/c aztreonam, Vanc


   --ID Dr. Blackmon following 





Left UPJ obstruction s/p stent placement 2017 ([facility]) s/p stent removal 4/3/

18 ([facility])


   --4/30  kidneys, bladder: interval removal of left stent, interval 

resolution of left hydro, no right hydro





Influenza exposure


   --no upper respiratory symptoms


   --d/c'd Tamiflu per ID


   


Hypertension


   --BP stable


   --continue metoprolol, amlodipine


   


Hyperlipidemia


   --continue Lipitor





Coronary artery disease


   --continue lopressor, amlodipine, Lipitor





Non-Hodgkins lymphoma


   --stable





Dementia


   --at baseline per family





FEN


   Fluids: PO intake adequate


   Electrolytes: replete as indicated


   Nutrition: soft, diabetic diet





DVT prophylaxis: subq heparin





Physical therapy





Dispo: continues to require inpatient care. Full code.





Visit type





- Emergency Visit


Emergency Visit: Yes


ED Registration Date: 04/29/18


Care time: The patient presented to the Emergency Department on the above date 

and was hospitalized for further evaluation of their emergent condition.





- New Patient


This patient is new to me today: No





- Critical Care


Critical Care patient: No

## 2018-05-02 NOTE — PN
Progress Note, Physician


History of Present Illness: 





The patient is a 68 yr old female with Hx dementia who was found to have fever 

yesterday.


Her daughter noted intermittent chills and took her T which was 103.0 She was 

brought rick the ED, where w/up (+) leukocytosis.


She received VAnco, Gent and was empirically started on Tamiflu. Hospital 

course significant for Dx UTI, C/S (+) GNR to be identified.


Her CT Scan hsows perinephric stranding suggestive of pyelonephritis.





- Current Medication List


Current Medications: 


Active Medications





Acetaminophen (Tylenol -)  650 mg PO Q4H PRN


   PRN Reason: FEVER


   Last Admin: 04/30/18 20:27 Dose:  650 mg


Amlodipine Besylate (Norvasc -)  5 mg PO DAILY [facility]


   Last Admin: 05/02/18 09:48 Dose:  5 mg


Atorvastatin Calcium (Lipitor -)  10 mg PO HS [facility]


   Last Admin: 05/01/18 21:20 Dose:  10 mg


Donepezil HCl (Aricept -)  10 mg PO HS [facility]


   Last Admin: 05/01/18 21:20 Dose:  10 mg


Heparin Sodium (Porcine) (Heparin -)  5,000 unit SQ TID FEST[facility]


   Last Admin: 05/02/18 06:53 Dose:  5,000 unit


Sodium Chloride (Normal Saline -)  1,000 mls @ 83 mls/hr IV ASDIR FESTUS


   Last Admin: 05/02/18 09:48 Dose:  Not Given


Aztreonam 1 gm/ Dextrose  50 mls @ 100 mls/hr IVPB Q8H-IV FESTUS


   PRN Reason: Protocol


   Stop: 05/08/18 23:59


Insulin Aspart (Novolog Vial Sliding Scale -)  1 vial SQ ACHS FEST[facility]


   PRN Reason: Protocol


   Last Admin: 05/02/18 06:53 Dose:  Not Given


Insulin Detemir (Levemir Vial)  35 units SQ HS [facility]


   Last Admin: 05/01/18 21:20 Dose:  35 unit


Metoprolol Tartrate (Lopressor -)  50 mg PO DAILY [facility]


   Last Admin: 05/02/18 09:48 Dose:  50 mg


Pantoprazole Sodium (Protonix -)  40 mg PO DAILY [facility]


   Last Admin: 05/02/18 09:48 Dose:  40 mg


Trazodone HCl (Desyrel -)  50 mg PO HS [facility]


   Last Admin: 05/01/18 21:20 Dose:  50 mg











- Objective


Vital Signs: 


 Vital Signs











Temperature  98.9 F   05/02/18 09:00


 


Pulse Rate  83   05/02/18 09:00


 


Respiratory Rate  16   05/02/18 09:00


 


Blood Pressure  164/80   05/02/18 09:00


 


O2 Sat by Pulse Oximetry (%)  98   05/02/18 09:00











Constitutional: Yes: Well Nourished


Cardiovascular: Yes: Regular Rate and Rhythm


Respiratory: Yes: CTA Bilaterally


Gastrointestinal: Yes: Normal Bowel Sounds, Soft


Labs: 


 CBC, BMP





 05/01/18 09:20 





 05/01/18 09:20 





 INR, PTT











INR  1.01  (0.82-1.09)   04/29/18  19:00    














Assessment/Plan


Patient with fever, leukocytosis


Hx Strep UTI, infected stent.


GNR UTI, Pyelonephritis





Rx Azactam


KAVITHA Vanco


Await final ID organism

## 2018-05-03 LAB
ANION GAP SERPL CALC-SCNC: 7 MMOL/L (ref 8–16)
BASOPHILS # BLD: 0.6 % (ref 0–2)
BUN SERPL-MCNC: 11 MG/DL (ref 7–18)
CALCIUM SERPL-MCNC: 8 MG/DL (ref 8.5–10.1)
CHLORIDE SERPL-SCNC: 110 MMOL/L (ref 98–107)
CO2 SERPL-SCNC: 25 MMOL/L (ref 21–32)
CREAT SERPL-MCNC: 0.6 MG/DL (ref 0.55–1.02)
DEPRECATED RDW RBC AUTO: 15.9 % (ref 11.6–15.6)
EOSINOPHIL # BLD: 0.8 % (ref 0–4.5)
GLUCOSE SERPL-MCNC: 84 MG/DL (ref 74–106)
HCT VFR BLD CALC: 30.5 % (ref 32.4–45.2)
HGB BLD-MCNC: 10.5 GM/DL (ref 10.7–15.3)
LYMPHOCYTES # BLD: 22.6 % (ref 8–40)
MAGNESIUM SERPL-MCNC: 2 MG/DL (ref 1.8–2.4)
MCH RBC QN AUTO: 27.5 PG (ref 25.7–33.7)
MCHC RBC AUTO-ENTMCNC: 34.3 G/DL (ref 32–36)
MCV RBC: 80.2 FL (ref 80–96)
MONOCYTES # BLD AUTO: 6.9 % (ref 3.8–10.2)
NEUTROPHILS # BLD: 69.1 % (ref 42.8–82.8)
PLATELET # BLD AUTO: 207 K/MM3 (ref 134–434)
PMV BLD: 7.8 FL (ref 7.5–11.1)
POTASSIUM SERPLBLD-SCNC: 3.9 MMOL/L (ref 3.5–5.1)
RBC # BLD AUTO: 3.81 M/MM3 (ref 3.6–5.2)
SODIUM SERPL-SCNC: 142 MMOL/L (ref 136–145)
WBC # BLD AUTO: 5.1 K/MM3 (ref 4–10)

## 2018-05-03 RX ADMIN — DONEPEZIL HYDROCHLORIDE SCH MG: 10 TABLET, FILM COATED ORAL at 21:49

## 2018-05-03 RX ADMIN — INSULIN ASPART SCH UNITS: 100 INJECTION, SOLUTION INTRAVENOUS; SUBCUTANEOUS at 17:48

## 2018-05-03 RX ADMIN — INSULIN ASPART SCH UNITS: 100 INJECTION, SOLUTION INTRAVENOUS; SUBCUTANEOUS at 22:34

## 2018-05-03 RX ADMIN — ATORVASTATIN CALCIUM SCH MG: 10 TABLET, FILM COATED ORAL at 21:48

## 2018-05-03 RX ADMIN — METOPROLOL TARTRATE SCH MG: 50 TABLET, FILM COATED ORAL at 11:11

## 2018-05-03 RX ADMIN — INSULIN ASPART SCH: 100 INJECTION, SOLUTION INTRAVENOUS; SUBCUTANEOUS at 06:00

## 2018-05-03 RX ADMIN — ACETAMINOPHEN PRN MG: 325 TABLET ORAL at 06:55

## 2018-05-03 RX ADMIN — HEPARIN SODIUM SCH UNIT: 5000 INJECTION, SOLUTION INTRAVENOUS; SUBCUTANEOUS at 13:48

## 2018-05-03 RX ADMIN — AMLODIPINE BESYLATE SCH MG: 5 TABLET ORAL at 11:11

## 2018-05-03 RX ADMIN — PANTOPRAZOLE SODIUM SCH MG: 40 TABLET, DELAYED RELEASE ORAL at 11:11

## 2018-05-03 RX ADMIN — TRAZODONE HYDROCHLORIDE SCH MG: 50 TABLET ORAL at 21:48

## 2018-05-03 RX ADMIN — HEPARIN SODIUM SCH UNIT: 5000 INJECTION, SOLUTION INTRAVENOUS; SUBCUTANEOUS at 06:00

## 2018-05-03 RX ADMIN — INSULIN DETEMIR SCH UNIT: 100 INJECTION, SOLUTION SUBCUTANEOUS at 22:34

## 2018-05-03 RX ADMIN — ERTAPENEM SODIUM SCH MLS/HR: 1 INJECTION, POWDER, LYOPHILIZED, FOR SOLUTION INTRAMUSCULAR; INTRAVENOUS at 11:11

## 2018-05-03 RX ADMIN — HEPARIN SODIUM SCH UNIT: 5000 INJECTION, SOLUTION INTRAVENOUS; SUBCUTANEOUS at 21:48

## 2018-05-03 RX ADMIN — INSULIN ASPART SCH UNITS: 100 INJECTION, SOLUTION INTRAVENOUS; SUBCUTANEOUS at 13:46

## 2018-05-03 NOTE — PN
Physical Exam: 


SUBJECTIVE: Patient seen and examined. Arousable, no issues overnight.  Low 

grade fever this morning. Son a bedside. 








OBJECTIVE:





 Vital Signs











 Period  Temp  Pulse  Resp  BP Sys/Silver  Pulse Ox


 


 Last 24 Hr  98.1 F-100.4 F  72-99  16-18  124-164/70-80  98-98








PE


Neuro: nad, cn 2-12intact


HEENT: poor dentition 


Pulm: CTA anteriorly 


CV: s1 s2 rrr no mrg


Abd: s nt nd + bs


Ext: warm, no le edema 














 Laboratory Results - last 24 hr











  05/02/18 05/02/18 05/02/18





  12:03 17:40 21:06


 


POC Glucometer  292  254  276














  05/03/18





  05:53


 


POC Glucometer  105








Active Medications











Generic Name Dose Route Start Last Admin





  Trade Name Freq  PRN Reason Stop Dose Admin


 


Acetaminophen  650 mg  04/30/18 03:37  05/03/18 06:55





  Tylenol -  PO   650 mg





  Q4H PRN   Administration





  FEVER   


 


Amlodipine Besylate  5 mg  04/30/18 10:00  05/02/18 09:48





  Norvasc -  PO   5 mg





  DAILY FESTUS   Administration


 


Atorvastatin Calcium  10 mg  04/30/18 22:00  05/02/18 21:17





  Lipitor -  PO   10 mg





  HS FESTUS   Administration


 


Donepezil HCl  10 mg  04/30/18 22:00  05/02/18 21:17





  Aricept -  PO   10 mg





  HS FESTUS   Administration


 


Heparin Sodium (Porcine)  5,000 unit  04/30/18 06:00  05/03/18 06:00





  Heparin -  SQ   5,000 unit





  TID FESTUS   Administration


 


Ertapenem 1 gm/ Sodium  50 mls @ 100 mls/hr  05/02/18 13:00  05/02/18 14:13





  Chloride  IVPB   100 mls/hr





  DAILY FESTUS   Administration





  Protocol   


 


Insulin Aspart  1 vial  04/30/18 07:00  05/03/18 06:00





  Novolog Vial Sliding Scale -  SQ   Not Given





  ACHS FESTUS   





  Protocol   


 


Insulin Detemir  35 units  04/30/18 22:00  05/02/18 21:16





  Levemir Vial  SQ   35 unit





  HS FESTUS   Administration


 


Metoprolol Tartrate  50 mg  04/30/18 10:00  05/02/18 09:48





  Lopressor -  PO   50 mg





  DAILY FESTUS   Administration


 


Pantoprazole Sodium  40 mg  04/30/18 10:00  05/02/18 09:48





  Protonix -  PO   40 mg





  DAILY FESTUS   Administration


 


Trazodone HCl  50 mg  04/30/18 22:00  05/02/18 21:17





  Desyrel -  PO   50 mg





  HS FESTUS   Administration








 Microbiology





04/29/18 19:00   Blood - Peripheral Venous   Blood Culture - Preliminary


                            NO GROWTH OBTAINED AFTER 72 HOURS, INCUBATION TO 

CONTINUE


                            FOR 2 DAYS.


04/29/18 19:00   Blood - Peripheral Venous   Blood Culture - Preliminary


                            NO GROWTH OBTAINED AFTER 72 HOURS, INCUBATION TO 

CONTINUE


                            FOR 2 DAYS.


04/29/18 19:15   Urine - Urine Clean Catch   Urine Culture - Final


                            Klebsiella Pneumoniae - Esbl


04/29/18 Unknown   Nasopharyngeal Swab   Influenza Types A,B Antigen (NEDA) - 

Final


04/29/18 Unknown   Nasopharyngeal Swab    - Final








Assessment: 68 year old female with a PMH of HTN, HLD, CAD s/p stent, non-

Hodgkins lymphoma (2012 in remission), dementia (nonverbal), left UPJ 

obstruction s/p stent and stent removal, with recurrent UTIs.





Plan: 





1. Sepsis secondary to Klebsiella ESBL UTI


- Continue Ertapenem (day 2) 


- ID seeing 





2. Left UPJ obstruction s/p stent placement 2017 ([facility]) s/p stent removal 4/

3/18 ([facility])


- 4/30 US kidneys, bladder: interval removal of left stent, interval resolution 

of left hydro, no right hydro





3. Influenza exposure


- Flu negative, tamiflu stopped





4. HTN


- Continue metoprolol, amlodipine


   


5. HLD


- Continue Lipitor





6. CAD


- Continue lopressor, Lipitor


- Unclear why pt is not on ASA, will d/w son 





7. Non-Hodgkins lymphoma


- Stable





8. Dementia


- At baseline per family





9. Electrolytes


Hypomagnesemia: re check am level


Hypokalemia: check bmp 





10. DVT prophylaxis: subq heparin





Dispo: 


- Pending resolution of fevers, place picc line for total of 10 day abx course 

with dc home and infusion center 





Visit type





- Emergency Visit


Emergency Visit: Yes


ED Registration Date: 04/29/18


Care time: The patient presented to the Emergency Department on the above date 

and was hospitalized for further evaluation of their emergent condition.





- New Patient


This patient is new to me today: Yes


Date on this admission: 05/18/18





- Critical Care


Critical Care patient: No

## 2018-05-03 NOTE — PN
Progress Note, Physician


History of Present Illness: 





The patient is a 68 yr old female with Hx dementia who was found to have fever 

yesterday.


Her daughter noted intermittent chills and took her T which was 103.0 She was 

brought rick the ED, where w/up (+) leukocytosis.


She received VAnco, Gent and was empirically started on Tamiflu. Hospital 

course significant for Dx UTI/Pyelonephritis. Her CT Scan hsows perinephric 

stranding suggestive of pyelonephritis. Urine c/s (+) ESBL Klebsiella. Now on 

Invanz.





- Current Medication List


Current Medications: 


Active Medications





Acetaminophen (Tylenol -)  650 mg PO Q4H PRN


   PRN Reason: FEVER


   Last Admin: 05/03/18 06:55 Dose:  650 mg


Amlodipine Besylate (Norvasc -)  5 mg PO DAILY [facility]


   Last Admin: 05/02/18 09:48 Dose:  5 mg


Atorvastatin Calcium (Lipitor -)  10 mg PO HS [facility]


   Last Admin: 05/02/18 21:17 Dose:  10 mg


Donepezil HCl (Aricept -)  10 mg PO HS [facility]


   Last Admin: 05/02/18 21:17 Dose:  10 mg


Heparin Sodium (Porcine) (Heparin -)  5,000 unit SQ TID [facility]


   Last Admin: 05/03/18 06:00 Dose:  5,000 unit


Ertapenem 1 gm/ Sodium (Chloride)  50 mls @ 100 mls/hr IVPB DAILY [facility]


   PRN Reason: Protocol


   Last Admin: 05/02/18 14:13 Dose:  100 mls/hr


Insulin Aspart (Novolog Vial Sliding Scale -)  1 vial SQ ACHS [facility]


   PRN Reason: Protocol


   Last Admin: 05/03/18 06:00 Dose:  Not Given


Insulin Detemir (Levemir Vial)  35 units SQ HS [facility]


   Last Admin: 05/02/18 21:16 Dose:  35 unit


Metoprolol Tartrate (Lopressor -)  50 mg PO DAILY [facility]


   Last Admin: 05/02/18 09:48 Dose:  50 mg


Pantoprazole Sodium (Protonix -)  40 mg PO DAILY [facility]


   Last Admin: 05/02/18 09:48 Dose:  40 mg


Trazodone HCl (Desyrel -)  50 mg PO HS [facility]


   Last Admin: 05/02/18 21:17 Dose:  50 mg











- Objective


Vital Signs: 


 Vital Signs











Temperature  100.4 F H  05/03/18 06:00


 


Pulse Rate  79   05/03/18 06:00


 


Respiratory Rate  18   05/03/18 06:00


 


Blood Pressure  154/77   05/03/18 06:00


 


O2 Sat by Pulse Oximetry (%)  98   05/02/18 21:00











Constitutional: Yes: Calm


Cardiovascular: Yes: Regular Rate and Rhythm


Respiratory: Yes: CTA Bilaterally


Gastrointestinal: Yes: Normal Bowel Sounds


Labs: 


 INR, PTT











INR  1.01  (0.82-1.09)   04/29/18  19:00    














Assessment/Plan


Patient with fever, leukocytosis


Hx Strep UTI, infected stent.


UTI, Pyelonephritis with Klebsiella ESBL (+)





Agree Invanz 1 gr q 24


Would need at least 3 weeks Rx for Pyelonephritis.


Contact Isolation.

## 2018-05-03 NOTE — PN
Progress Note (short form)





- Note


Progress Note: 





fever curve improving


CT with left pyelonephritis


abx management as per ID





Problem List





- Problems


(1) UTI (urinary tract infection)


Code(s): N39.0 - URINARY TRACT INFECTION, SITE NOT SPECIFIED   


Qualifiers: 


   Urinary tract infection type: site unspecified   Hematuria presence: without 

hematuria   Qualified Code(s): N39.0 - Urinary tract infection, site not 

specified

## 2018-05-04 VITALS — SYSTOLIC BLOOD PRESSURE: 158 MMHG | DIASTOLIC BLOOD PRESSURE: 86 MMHG | TEMPERATURE: 97.9 F | HEART RATE: 76 BPM

## 2018-05-04 LAB
ANION GAP SERPL CALC-SCNC: 10 MMOL/L (ref 8–16)
BASOPHILS # BLD: 0.6 % (ref 0–2)
BUN SERPL-MCNC: 14 MG/DL (ref 7–18)
CALCIUM SERPL-MCNC: 8.5 MG/DL (ref 8.5–10.1)
CHLORIDE SERPL-SCNC: 107 MMOL/L (ref 98–107)
CO2 SERPL-SCNC: 24 MMOL/L (ref 21–32)
CREAT SERPL-MCNC: 0.7 MG/DL (ref 0.55–1.02)
DEPRECATED RDW RBC AUTO: 16.1 % (ref 11.6–15.6)
EOSINOPHIL # BLD: 1.1 % (ref 0–4.5)
GLUCOSE SERPL-MCNC: 133 MG/DL (ref 74–106)
HCT VFR BLD CALC: 30.8 % (ref 32.4–45.2)
HGB BLD-MCNC: 10.6 GM/DL (ref 10.7–15.3)
LYMPHOCYTES # BLD: 26.9 % (ref 8–40)
MAGNESIUM SERPL-MCNC: 2.1 MG/DL (ref 1.8–2.4)
MCH RBC QN AUTO: 27.6 PG (ref 25.7–33.7)
MCHC RBC AUTO-ENTMCNC: 34.5 G/DL (ref 32–36)
MCV RBC: 79.9 FL (ref 80–96)
MONOCYTES # BLD AUTO: 8.8 % (ref 3.8–10.2)
NEUTROPHILS # BLD: 62.6 % (ref 42.8–82.8)
PLATELET # BLD AUTO: 229 K/MM3 (ref 134–434)
PMV BLD: 7.9 FL (ref 7.5–11.1)
POTASSIUM SERPLBLD-SCNC: 3.9 MMOL/L (ref 3.5–5.1)
RBC # BLD AUTO: 3.86 M/MM3 (ref 3.6–5.2)
SODIUM SERPL-SCNC: 141 MMOL/L (ref 136–145)
WBC # BLD AUTO: 5.7 K/MM3 (ref 4–10)

## 2018-05-04 PROCEDURE — B513ZZA FLUOROSCOPY OF RIGHT JUGULAR VEINS, GUIDANCE: ICD-10-PCS | Performed by: NURSE PRACTITIONER

## 2018-05-04 PROCEDURE — 05HM33Z INSERTION OF INFUSION DEVICE INTO RIGHT INTERNAL JUGULAR VEIN, PERCUTANEOUS APPROACH: ICD-10-PCS | Performed by: NURSE PRACTITIONER

## 2018-05-04 RX ADMIN — INSULIN ASPART SCH UNITS: 100 INJECTION, SOLUTION INTRAVENOUS; SUBCUTANEOUS at 12:49

## 2018-05-04 RX ADMIN — INSULIN ASPART SCH: 100 INJECTION, SOLUTION INTRAVENOUS; SUBCUTANEOUS at 06:26

## 2018-05-04 RX ADMIN — HEPARIN SODIUM SCH UNIT: 5000 INJECTION, SOLUTION INTRAVENOUS; SUBCUTANEOUS at 06:26

## 2018-05-04 RX ADMIN — INSULIN ASPART SCH: 100 INJECTION, SOLUTION INTRAVENOUS; SUBCUTANEOUS at 17:48

## 2018-05-04 RX ADMIN — PANTOPRAZOLE SODIUM SCH MG: 40 TABLET, DELAYED RELEASE ORAL at 10:48

## 2018-05-04 RX ADMIN — METOPROLOL TARTRATE SCH MG: 50 TABLET, FILM COATED ORAL at 10:48

## 2018-05-04 RX ADMIN — HEPARIN SODIUM SCH: 5000 INJECTION, SOLUTION INTRAVENOUS; SUBCUTANEOUS at 14:22

## 2018-05-04 RX ADMIN — AMLODIPINE BESYLATE SCH MG: 5 TABLET ORAL at 10:48

## 2018-05-04 RX ADMIN — ERTAPENEM SODIUM SCH MLS/HR: 1 INJECTION, POWDER, LYOPHILIZED, FOR SOLUTION INTRAMUSCULAR; INTRAVENOUS at 09:52

## 2018-05-04 NOTE — DS
Physical Exam: 


SUBJECTIVE: Patient seen and examined at the bedside.


 and daughter asking that patient be premedicated prior to PICC line.





OBJECTIVE:


For discharge today for initiation of home services 


Ertapenem 1gram x 3 weeks





 Vital Signs











 Period  Temp  Pulse  Resp  BP Sys/Silver  Pulse Ox


 


 Last 24 Hr  97.2 F-98.2 F  61-78  18-20  137-154/69-90  98








PHYSICAL EXAM





GENERAL: The patient is awake, alert, and fully oriented, in no acute distress.


HEAD: Normal with no signs of trauma.


EYES: PERRL, extraocular movements intact, sclera anicteric, conjunctiva clear. 


ENT: Ears normal, nares patent, oropharynx clear without exudates, moist mucous 

membranes.


NECK: Trachea midline, full range of motion, supple. 


LUNGS: Breath sounds equal, clear to auscultation bilaterally, no wheezes, no 

crackles, no accessory muscle use. 


HEART: Regular rate and rhythm, S1, S2 without murmur, rub or gallop.


ABDOMEN: Soft, nontender, nondistended, normoactive bowel sounds, no guarding, 

no rebound, no hepatosplenomegaly, no masses.


EXTREMITIES: 2+ pulses, warm, well-perfused, no edema. 


NEUROLOGICAL: Cranial nerves II through XII grossly intact. Normal speech, gait 

not observed.


PSYCH: Normal mood, normal affect.


SKIN: Warm, dry, normal turgor, no rashes or lesions noted.





LABS


 Laboratory Results - last 24 hr











  05/03/18 05/03/18 05/03/18





  12:20 17:31 22:32


 


WBC   


 


RBC   


 


Hgb   


 


Hct   


 


MCV   


 


MCH   


 


MCHC   


 


RDW   


 


Plt Count   


 


MPV   


 


Neutrophils %   


 


Lymphocytes %   


 


Monocytes %   


 


Eosinophils %   


 


Basophils %   


 


Sodium   


 


Potassium   


 


Chloride   


 


Carbon Dioxide   


 


Anion Gap   


 


BUN   


 


Creatinine   


 


POC Glucometer  230  280  270


 


Random Glucose   


 


Calcium   


 


Magnesium   














  05/04/18 05/04/18 05/04/18





  05:35 05:35 06:20


 


WBC  5.7  


 


RBC  3.86  


 


Hgb  10.6 L  


 


Hct  30.8 L  


 


MCV  79.9 L  


 


MCH  27.6  


 


MCHC  34.5  


 


RDW  16.1 H  


 


Plt Count  229  


 


MPV  7.9  


 


Neutrophils %  62.6  


 


Lymphocytes %  26.9  


 


Monocytes %  8.8  


 


Eosinophils %  1.1  


 


Basophils %  0.6  


 


Sodium   141 


 


Potassium   3.9 


 


Chloride   107 


 


Carbon Dioxide   24 


 


Anion Gap   10 


 


BUN   14 


 


Creatinine   0.7 


 


POC Glucometer    139


 


Random Glucose   133 H 


 


Calcium   8.5 


 


Magnesium   2.1 














  05/04/18





  12:03


 


WBC 


 


RBC 


 


Hgb 


 


Hct 


 


MCV 


 


MCH 


 


MCHC 


 


RDW 


 


Plt Count 


 


MPV 


 


Neutrophils % 


 


Lymphocytes % 


 


Monocytes % 


 


Eosinophils % 


 


Basophils % 


 


Sodium 


 


Potassium 


 


Chloride 


 


Carbon Dioxide 


 


Anion Gap 


 


BUN 


 


Creatinine 


 


POC Glucometer  252


 


Random Glucose 


 


Calcium 


 


Magnesium 











HOSPITAL COURSE:





Date of Admission:04/29/18





Date of Discharge: 05/04/18





Patient is a 68 year old female with a significant past medical history of 

hypertension, hyperlipidemia, CAD s/p stent, non-Hodgkins lymphoma (remission), 

alzheimers dementia, left UPJ obstruction s/p stent and stent removal, with 

recurrent UTIs.





ID:


Sepsis secondary Pyelonephritis with Klebsiella ESBL (+), continue outpatient 

IV antibiotics x 3 weeks


Fever and Leukocytosis resolved


Strep UTI, infected stent.


As per ID, patient is to continue 3 more weeks of Ertapenem 1 gram via PICC line


Patient to receive home services


Monitor CBC, CMP, ESR, CRP weekly and results should be sent to Dr. Blackmon





Negative for flu, tamiflu discontinued





Urology:


Left UPJ obstruction s/p stent placement 2017 ([facility]) s/p stent removal 4/3/

18 ([facility])


4/30  kidneys, bladder: interval removal of left stent, interval resolution 

of left hydro, no right hydro





Card:


Hypertension, chronic


On Metoprolol, Amlodipine


   


HLD, chronic


On Lipitor





CAD s/p stents


On Lopressor, Lipitor





Neuro:


Alzheimers dementia


At baseline per family


Non verbal





Electrolytes


Hypomagnesemia, resolved


Hypokalemia, resolved





Disposition:


Discharge home with home care services.  Patient is to received Ertapenem 1 

gram via PICC line daily for 3 weeks as per ID .





Minutes to complete discharge: 60





Discharge Summary


Reason For Visit: SIRS,UTI


Current Active Problems





Systemic inflammatory response syndrome (SIRS) (Acute)


UTI (urinary tract infection) (Acute)








Condition: Stable





- Instructions


Diet, Activity, Other Instructions: 


Discharge home with continuation of Ertapenem 1 gram via PICC line for 3 more 

weeks.





Care at home to be resumed by Shayy.





Please call me with any questions you may have





BRYAN Jasmine @ [facility]


[phone]


Referrals: 


Precious Ziegler [Primary Care Provider] - 


Disposition: VNS/HOME HEALTH CARE





- Home Medications


Comprehensive Discharge Medication List: 


Ambulatory Orders





Metformin HCl 1,000 mg PO BIDAC 09/25/14 


Metoprolol Tartrate [Lopressor -] 50 mg PO DAILY 09/25/14 


Pravastatin Sodium 10 mg PO HS 03/28/18 


Amlodipine Besylate [Norvasc -] 5 mg PO DAILY 03/29/18 


Donepezil HCl 10 mg PO HS 03/29/18 


Insulin Glargine,Hum.rec.anlog [Lantus Solostar] 35 unit SQ DAILY 03/29/18 


Pantoprazole Sodium [Protonix] 40 mg PO DAILY 03/29/18 


Zolpidem Tartrate [Ambien] 10 mg PO HS 03/29/18 








This patient is new to me today: Yes


Date on this admission: 05/04/18


Emergency Visit: No


Critical Care patient: No





- Discharge Referral


Referred to [facility] No

## 2018-06-28 ENCOUNTER — HOSPITAL ENCOUNTER (INPATIENT)
Dept: HOSPITAL 74 - JER | Age: 68
LOS: 8 days | Discharge: HOME | DRG: 872 | End: 2018-07-06
Attending: INTERNAL MEDICINE | Admitting: INTERNAL MEDICINE
Payer: COMMERCIAL

## 2018-06-28 VITALS — BODY MASS INDEX: 23.3 KG/M2

## 2018-06-28 DIAGNOSIS — I25.10: ICD-10-CM

## 2018-06-28 DIAGNOSIS — N13.39: ICD-10-CM

## 2018-06-28 DIAGNOSIS — E78.5: ICD-10-CM

## 2018-06-28 DIAGNOSIS — B96.1: ICD-10-CM

## 2018-06-28 DIAGNOSIS — R65.20: ICD-10-CM

## 2018-06-28 DIAGNOSIS — E87.1: ICD-10-CM

## 2018-06-28 DIAGNOSIS — N39.0: ICD-10-CM

## 2018-06-28 DIAGNOSIS — Z87.891: ICD-10-CM

## 2018-06-28 DIAGNOSIS — G47.00: ICD-10-CM

## 2018-06-28 DIAGNOSIS — A41.9: Primary | ICD-10-CM

## 2018-06-28 DIAGNOSIS — A04.72: ICD-10-CM

## 2018-06-28 DIAGNOSIS — R56.9: ICD-10-CM

## 2018-06-28 DIAGNOSIS — E11.9: ICD-10-CM

## 2018-06-28 DIAGNOSIS — Z85.71: ICD-10-CM

## 2018-06-28 DIAGNOSIS — N20.1: ICD-10-CM

## 2018-06-28 DIAGNOSIS — F03.90: ICD-10-CM

## 2018-06-28 DIAGNOSIS — Z79.84: ICD-10-CM

## 2018-06-28 DIAGNOSIS — Z79.4: ICD-10-CM

## 2018-06-28 DIAGNOSIS — E87.2: ICD-10-CM

## 2018-06-28 DIAGNOSIS — I10: ICD-10-CM

## 2018-06-28 DIAGNOSIS — K21.9: ICD-10-CM

## 2018-06-28 LAB
ALBUMIN SERPL-MCNC: 3.9 G/DL (ref 3.4–5)
ALP SERPL-CCNC: 105 U/L (ref 45–117)
ALT SERPL-CCNC: 28 U/L (ref 12–78)
ANION GAP SERPL CALC-SCNC: 11 MMOL/L (ref 8–16)
APPEARANCE UR: CLEAR
APTT BLD: 26.4 SECONDS (ref 25.2–36.5)
AST SERPL-CCNC: 14 U/L (ref 15–37)
BACTERIA #/AREA URNS HPF: (no result) /HPF
BASOPHILS # BLD: 0.3 % (ref 0–2)
BILIRUB SERPL-MCNC: 0.3 MG/DL (ref 0.2–1)
BILIRUB UR STRIP.AUTO-MCNC: NEGATIVE MG/DL
BUN SERPL-MCNC: 24 MG/DL (ref 7–18)
CALCIUM SERPL-MCNC: 8.9 MG/DL (ref 8.5–10.1)
CHLORIDE SERPL-SCNC: 100 MMOL/L (ref 98–107)
CO2 SERPL-SCNC: 23 MMOL/L (ref 21–32)
COLOR UR: (no result)
CREAT SERPL-MCNC: 0.9 MG/DL (ref 0.55–1.02)
DEPRECATED RDW RBC AUTO: 14.8 % (ref 11.6–15.6)
EOSINOPHIL # BLD: 0.4 % (ref 0–4.5)
EPITH CASTS URNS QL MICRO: (no result) /HPF
GLUCOSE SERPL-MCNC: 147 MG/DL (ref 74–106)
HCT VFR BLD CALC: 37.1 % (ref 32.4–45.2)
HGB BLD-MCNC: 12.3 GM/DL (ref 10.7–15.3)
INR BLD: 0.96 (ref 0.82–1.09)
KETONES UR QL STRIP: NEGATIVE
LEUKOCYTE ESTERASE UR QL STRIP.AUTO: (no result)
LYMPHOCYTES # BLD: 8 % (ref 8–40)
MCH RBC QN AUTO: 27.1 PG (ref 25.7–33.7)
MCHC RBC AUTO-ENTMCNC: 33.1 G/DL (ref 32–36)
MCV RBC: 81.8 FL (ref 80–96)
MONOCYTES # BLD AUTO: 5.8 % (ref 3.8–10.2)
MUCOUS THREADS URNS QL MICRO: (no result)
NEUTROPHILS # BLD: 85.5 % (ref 42.8–82.8)
NITRITE UR QL STRIP: NEGATIVE
PH BLDV: 7.42 [PH] (ref 7.32–7.42)
PH UR: 5 [PH] (ref 5–8)
PLATELET # BLD AUTO: 216 K/MM3 (ref 134–434)
PMV BLD: 8.7 FL (ref 7.5–11.1)
POTASSIUM SERPLBLD-SCNC: 4.8 MMOL/L (ref 3.5–5.1)
PROT SERPL-MCNC: 7.9 G/DL (ref 6.4–8.2)
PROT UR QL STRIP: NEGATIVE
PROT UR QL STRIP: NEGATIVE
PT PNL PPP: 10.8 SEC (ref 9.7–13)
RBC # BLD AUTO: 4.53 M/MM3 (ref 3.6–5.2)
SODIUM SERPL-SCNC: 134 MMOL/L (ref 136–145)
SP GR UR: 1.01 (ref 1–1.03)
UROBILINOGEN UR STRIP-MCNC: NEGATIVE MG/DL (ref 0.2–1)
VENOUS PC02: 29.7 MMHG (ref 38–52)
VENOUS PO2: 168 MMHG (ref 28–48)
WBC # BLD AUTO: 13.9 K/MM3 (ref 4–10)

## 2018-06-28 NOTE — PDOC
History of Present Illness





- General


Stated Complaint: POSSIBLE SEIZURE


Time Seen by Provider: 06/28/18 19:37


History Source: Family


Exam Limitations: Dementia





- History of Present Illness


Initial Comments: 





06/28/18 20:31


This is a 68 YOF with h/o recently diagnosed C. difficile diarrhea concurrent 

with Klebsiella UTI (dx within the past couple of weeks and now on PO 

vancomycin and Bactrim), seizures in the setting of infection (started on 

Keppra 2 wks ago after seizure x2), severe dementia (nonverbal at baseline), 

IDDM, CAD, HLD, and HTN who p/w increased tremors/rigors, malaise, and fever up 

to 100.7 at home. She additionally has been holding her lower abdomen and 

crouching forward throughout the day, seeming to be in pain. The family note 

that the patient continues to take all her medications as prescribed, and the 

last time she saw one of her providers was at the end of last week. She is 

followed by Sohail Ziegler, Jael, and Lorri.





Past History





- Past Medical History


Allergies/Adverse Reactions: 


 Allergies











Allergy/AdvReac Type Severity Reaction Status Date / Time


 


Penicillins Allergy   Verified 04/29/18 18:50











Home Medications: 


Ambulatory Orders





Metformin HCl 1,000 mg PO BIDAC 09/25/14 


Metoprolol Tartrate [Lopressor -] 50 mg PO DAILY 09/25/14 


Pravastatin Sodium 10 mg PO HS 03/28/18 


Amlodipine Besylate [Norvasc -] 5 mg PO DAILY 03/29/18 


Donepezil HCl 10 mg PO HS 03/29/18 


Insulin Glargine,Hum.rec.anlog [Lantus Solostar] 35 unit SQ DAILY 03/29/18 


Pantoprazole Sodium [Protonix] 40 mg PO DAILY 03/29/18 


Zolpidem Tartrate [Ambien] 10 mg PO HS 03/29/18 


Ertapenem Sodium - 1 Gram [Invanz (Pre-Docked)] 1 gm IVPB ONCE #21 bag 05/04/18 


Picc Line Flush [Picc Line Flush -] 8 ml IVPUSH PRN PRN  ml 05/04/18 


traZODone HCL [Desyrel -] 50 mg PO HS  tablet 05/04/18 








Cancer: Yes (NON-[facility])


Cardiac Disorders: Yes (CAD)


Dementia: Yes


Diabetes: Yes


HTN: Yes


Hypercholesterolemia: Yes





- Surgical History


Abdominal Surgery: Yes (Kidney stent)





- Suicide/Smoking/Psychosocial Hx


Smoking History: Never smoked


Have you smoked in the past 12 months: No


Number of Cigarettes Smoked Daily: 0


If you are a former smoker, when did you quit?: 6 months ago


Information on smoking cessation initiated: No


'Breaking Loose' booklet given: 03/29/18


Hx Alcohol Use: No


Drug/Substance Use Hx: No


Substance Use Type: None





**Review of Systems





- Review of Systems


Able to Perform ROS?: No (dementia)





*Physical Exam





- Vital Signs


 Last Vital Signs











Temp Pulse Resp BP Pulse Ox


 


 102.0 F H  81   18   150/73   100 


 


 06/28/18 20:00  06/28/18 20:00  06/28/18 20:00  06/28/18 20:00  06/28/18 20:00














- Physical Exam


General Appearance: Yes: Nourished, Appropriately Dressed, Mild Distress (

intermittent mild distress), Other


HEENT: positive: EOMI, Normal Voice, Hearing Grossly Normal.  negative: Scleral 

Icterus (R), Scleral Icterus (L), Nasal Congestion


Neck: positive: Trachea midline, Supple.  negative: Tender, Rigid


Respiratory/Chest: positive: Lungs Clear, Normal Breath Sounds.  negative: 

Respiratory Distress, Crackles, Rhonchi, Stridor, Wheezing


Cardiovascular: positive: Regular Rhythm, Regular Rate, S1, S2.  negative: Edema

, JVD, Murmur


Gastrointestinal/Abdominal: positive: Normal Bowel Sounds, Soft.  negative: 

Tender, Organomegaly, Pulsatile Mass, Guarding


Musculoskeletal: positive: Normal Inspection.  negative: Decreased Range of 

Motion, Vertebral Tenderness


Extremity: positive: Normal Capillary Refill, Normal Inspection, Normal Range 

of Motion.  negative: Tender, Cyanosis


Integumentary: positive: Normal Color, Dry, Warm.  negative: Erythema, Rash, 

Bruising


Neurologic: positive: CNs II-XII NML intact, Fully Oriented, Alert, Normal Mood/

Affect, Normal Response, Motor Strength 5/5





**Heart Score/ECG Review


  ** #1


06/28/18 21:47


NSR rate 82 with left axis deviation, QTc-462, no ischemic changes





ED Treatment Course





- LABORATORY


CBC & Chemistry Diagram: 


 06/28/18 20:23





 06/28/18 20:23





- RADIOLOGY


Radiology Studies Ordered: 














 Category Date Time Status


 


 CHEST X-RAY PORTABLE* [RAD] Stat Radiology  06/28/18 20:12 Ordered














- Medications


Given in the ED: 


ED Medications














Discontinued Medications














Generic Name Dose Route Start Last Admin





  Trade Name Freq  PRN Reason Stop Dose Admin


 


Sodium Chloride  2,000 ml  06/28/18 20:12  06/28/18 20:19





  Normal Saline -  IV  06/28/18 20:13  2,000 ml





  ONCE STA   Administration





     





     





     





     














Medical Decision Making





- Medical Decision Making





06/28/18 21:48


Older adult female Pt currently being tx for C. diff diarrhea and Klebsiella 

UTI p/w sepsis.





 Initial Vital Signs











Temp Pulse Resp BP Pulse Ox


 


 102.0 F H  81   18   150/73   100 


 


 06/28/18 20:00  06/28/18 20:00  06/28/18 20:00  06/28/18 20:00  06/28/18 20:00











Exam: nonverbal but makes eye contact and somewhat interactive, laughs 

inappropriately per baseline, occasionally grabbing lower abdomen and wincing 

in pain, but normal heart and lung exams, no ttp abdomen.


DDX IBNLT: pseudomembranous colitis, UTI/Pyelonephritis, SBO (complete vs/ 

partial)


W/U ordered: Septic order set.


TX ordered: IVF, Ofirmev, PO Vancomycin, IV Flagyl, IV Zosyn after IV Benadryl (

abx per ID recs).





EKG: Reviewed; results as noted in ECG Review section.


CXR: NADP.


CT A/P W/ IV and PO: No e/o colitis or diverticulitis, hydroureteronephrosis 

with possible tiny obstructing stone, para-aortic lymphadenopathy.








 Laboratory Tests











  06/28/18 06/28/18 06/28/18





  20:12 20:12 20:23


 


WBC    13.9 H


 


RBC    4.53


 


Hgb    12.3


 


Hct    37.1  D


 


MCV    81.8


 


MCH    27.1


 


MCHC    33.1


 


RDW    14.8


 


Plt Count    216


 


MPV    8.7  D


 


Absolute Neuts (auto)    11.8


 


Neutrophils %    85.5 H D


 


Lymphocytes %    8.0  D


 


Monocytes %    5.8


 


Eosinophils %    0.4


 


Basophils %    0.3


 


Nucleated RBC %    0


 


PT with INR   


 


INR   


 


PTT (Actin FS)   


 


VBG pH  7.42  


 


POC VBG pCO2  29.7 L  


 


POC VBG pO2  168.0 H* D  


 


Mixed VBG HCO3  18.8 L  


 


Sodium   


 


Potassium   


 


Chloride   


 


Carbon Dioxide   


 


Anion Gap   


 


BUN   


 


Creatinine   


 


Creat Clearance w eGFR   


 


Random Glucose   


 


Lactic Acid   3.0 H* 


 


Calcium   


 


Total Bilirubin   


 


AST   


 


ALT   


 


Alkaline Phosphatase   


 


Creatine Kinase   


 


CK-MB (CK-2)   


 


Troponin I   


 


Total Protein   


 


Albumin   


 


Blood Type   


 


Antibody Screen   














  06/28/18 06/28/18 06/28/18





  20:23 20:23 20:23


 


WBC   


 


RBC   


 


Hgb   


 


Hct   


 


MCV   


 


MCH   


 


MCHC   


 


RDW   


 


Plt Count   


 


MPV   


 


Absolute Neuts (auto)   


 


Neutrophils %   


 


Lymphocytes %   


 


Monocytes %   


 


Eosinophils %   


 


Basophils %   


 


Nucleated RBC %   


 


PT with INR  10.80  


 


INR  0.96  


 


PTT (Actin FS)  26.4  


 


VBG pH   


 


POC VBG pCO2   


 


POC VBG pO2   


 


Mixed VBG HCO3   


 


Sodium   134 L 


 


Potassium   4.8 


 


Chloride   100 


 


Carbon Dioxide   23 


 


Anion Gap   11 


 


BUN   24 H 


 


Creatinine   0.9 


 


Creat Clearance w eGFR   > 60 


 


Random Glucose   147 H 


 


Lactic Acid   


 


Calcium   8.9 


 


Total Bilirubin   0.3 


 


AST   14 L 


 


ALT   28 


 


Alkaline Phosphatase   105 


 


Creatine Kinase   62 


 


CK-MB (CK-2)   1.92 


 


Troponin I   


 


Total Protein   7.9 


 


Albumin   3.9 


 


Blood Type    Cancelled


 


Antibody Screen    Cancelled














  06/28/18





  20:23


 


WBC 


 


RBC 


 


Hgb 


 


Hct 


 


MCV 


 


MCH 


 


MCHC 


 


RDW 


 


Plt Count 


 


MPV 


 


Absolute Neuts (auto) 


 


Neutrophils % 


 


Lymphocytes % 


 


Monocytes % 


 


Eosinophils % 


 


Basophils % 


 


Nucleated RBC % 


 


PT with INR 


 


INR 


 


PTT (Actin FS) 


 


VBG pH 


 


POC VBG pCO2 


 


POC VBG pO2 


 


Mixed VBG HCO3 


 


Sodium 


 


Potassium 


 


Chloride 


 


Carbon Dioxide 


 


Anion Gap 


 


BUN 


 


Creatinine 


 


Creat Clearance w eGFR 


 


Random Glucose 


 


Lactic Acid 


 


Calcium 


 


Total Bilirubin 


 


AST 


 


ALT 


 


Alkaline Phosphatase 


 


Creatine Kinase 


 


CK-MB (CK-2) 


 


Troponin I  < 0.02


 


Total Protein 


 


Albumin 


 


Blood Type 


 


Antibody Screen 














ADMIT


The Pt is unsafe for discharge at this time.


They require further hospital observation, workup, and treatment.


Consult order placed to Dr. Calderón.


Microblog sent to Symp[facility] for admission.


Spoke with [facility], in agreement Pt to be admitted to IP Med/Surg.


Decision to Admit order placed to covering attending Dr. Vickers.





*DC/Admit/Observation/Transfer


Diagnosis at time of Disposition: 


 C. difficile diarrhea, Hydroureteronephrosis





UTI (urinary tract infection)


Qualifiers:


 Urinary tract infection type: site unspecified Hematuria presence: without 

hematuria Qualified Code(s): N39.0 - Urinary tract infection, site not specified





Sepsis


Qualifiers:


 Sepsis type: sepsis due to unspecified organism Qualified Code(s): A41.9 - 

Sepsis, unspecified organism








- Discharge Dispostion


Condition at time of disposition: Guarded


Decision to Admit order: Yes





- Referrals





- Patient Instructions





- Post Discharge Activity

## 2018-06-28 NOTE — PDOC
Attending Attestation





- HPI


HPI: 





06/28/18 21:55


The patient is a 68 year old female, with a significant PMH of recently 

diagnosed C. Difficile concurrent with Klebsiella UTI (on prescribed PO 

Vancomycin and Bactrim), seizures in the setting of recent infection (started 

on Keppra 2 weeks ago), severe dementia (non verbal at baseline), IDDM, 

coronary artery disease, hyperlipidemia, and hypertension who presents to the 

emergency department with increased tremors, generalized malaise and measured 

fever of 100.7 earlier today. As per patients family at bedside, the patient 

has been taking all of her medications as prescribed. 





Allergies: penicillins 








- Physicial Exam


PE: 





06/28/18 21:56


GENERAL: 


(+) Mild distress. (+) Febrile. Well-appearing, well-nourished. 


HEENT: 


Normocephalic, atraumatic. PERRL, EOM intact.


CARDIOVASCULAR: 


Normal S1, S2. Regular rate and rhythm. No JVD, edema, murmur. 


PULMONARY: 


Clear to auscultation bilaterally. No rales, rhonchi, stridor, wheezing. 


ABDOMEN: 


Soft, non-distended, non-tender. No rebound or guarding. 


EXTREMITIES: 


Normal ROM in all four extremities. No gross deformities.


SKIN: 


Warm, dry.  No rash


NEUROLOGICAL: 


No focal neurological deficits.








<Daniel Moralez - Last Filed: 06/28/18 21:55>





- Resident


Resident Name: LiatArianna





- ED Attending Attestation


I have performed the following: I have examined & evaluated the patient, The 

case was reviewed & discussed with the resident, I agree w/resident's findings 

& plan, Exceptions are as noted





- Medical Decision Making





07/02/18 19:59


Pt admitted to [facility]





<Brant Coombs - Last Filed: 07/02/18 20:00>





Attestations





- Attestations





06/28/18 21:59





Documentation prepared by Daniel Moralez, acting as medical scribe for Brant Coombs DO.





<Daniel Moralez - Last Filed: 06/28/18 21:55>

## 2018-06-29 LAB
ALBUMIN SERPL-MCNC: 3.2 G/DL (ref 3.4–5)
ALP SERPL-CCNC: 81 U/L (ref 45–117)
ALT SERPL-CCNC: 25 U/L (ref 12–78)
ANION GAP SERPL CALC-SCNC: 10 MMOL/L (ref 8–16)
AST SERPL-CCNC: 15 U/L (ref 15–37)
BASOPHILS # BLD: 0.3 % (ref 0–2)
BILIRUB SERPL-MCNC: 0.6 MG/DL (ref 0.2–1)
BUN SERPL-MCNC: 13 MG/DL (ref 7–18)
CALCIUM SERPL-MCNC: 8.2 MG/DL (ref 8.5–10.1)
CHLORIDE SERPL-SCNC: 105 MMOL/L (ref 98–107)
CO2 SERPL-SCNC: 24 MMOL/L (ref 21–32)
CREAT SERPL-MCNC: 0.9 MG/DL (ref 0.55–1.02)
DEPRECATED RDW RBC AUTO: 14.8 % (ref 11.6–15.6)
EOSINOPHIL # BLD: 0.1 % (ref 0–4.5)
GLUCOSE SERPL-MCNC: 162 MG/DL (ref 74–106)
HCT VFR BLD CALC: 36.7 % (ref 32.4–45.2)
HGB BLD-MCNC: 12.4 GM/DL (ref 10.7–15.3)
LYMPHOCYTES # BLD: 5.7 % (ref 8–40)
MCH RBC QN AUTO: 27.6 PG (ref 25.7–33.7)
MCHC RBC AUTO-ENTMCNC: 33.9 G/DL (ref 32–36)
MCV RBC: 81.3 FL (ref 80–96)
MONOCYTES # BLD AUTO: 3.4 % (ref 3.8–10.2)
NEUTROPHILS # BLD: 90.5 % (ref 42.8–82.8)
PLATELET # BLD AUTO: 168 K/MM3 (ref 134–434)
PMV BLD: 7.9 FL (ref 7.5–11.1)
POTASSIUM SERPLBLD-SCNC: 4.3 MMOL/L (ref 3.5–5.1)
PROT SERPL-MCNC: 6.8 G/DL (ref 6.4–8.2)
RBC # BLD AUTO: 4.51 M/MM3 (ref 3.6–5.2)
SODIUM SERPL-SCNC: 139 MMOL/L (ref 136–145)
WBC # BLD AUTO: 10 K/MM3 (ref 4–10)

## 2018-06-29 RX ADMIN — ACETAMINOPHEN PRN MG: 325 TABLET ORAL at 04:40

## 2018-06-29 RX ADMIN — SODIUM CHLORIDE SCH MLS/HR: 9 INJECTION, SOLUTION INTRAVENOUS at 03:32

## 2018-06-29 RX ADMIN — METOPROLOL TARTRATE SCH MG: 50 TABLET, FILM COATED ORAL at 10:08

## 2018-06-29 RX ADMIN — HEPARIN SODIUM SCH UNIT: 5000 INJECTION, SOLUTION INTRAVENOUS; SUBCUTANEOUS at 21:37

## 2018-06-29 RX ADMIN — ACETAMINOPHEN PRN MG: 325 TABLET ORAL at 06:30

## 2018-06-29 RX ADMIN — SODIUM CHLORIDE SCH MLS/HR: 9 INJECTION, SOLUTION INTRAVENOUS at 21:58

## 2018-06-29 RX ADMIN — VANCOMYCIN HYDROCHLORIDE SCH AMP: 250 CAPSULE ORAL at 18:10

## 2018-06-29 RX ADMIN — HEPARIN SODIUM SCH UNIT: 5000 INJECTION, SOLUTION INTRAVENOUS; SUBCUTANEOUS at 14:13

## 2018-06-29 RX ADMIN — INSULIN ASPART SCH UNITS: 100 INJECTION, SOLUTION INTRAVENOUS; SUBCUTANEOUS at 21:54

## 2018-06-29 RX ADMIN — VANCOMYCIN HYDROCHLORIDE SCH AMP: 250 CAPSULE ORAL at 06:17

## 2018-06-29 RX ADMIN — VANCOMYCIN HYDROCHLORIDE SCH AMP: 250 CAPSULE ORAL at 13:03

## 2018-06-29 RX ADMIN — DONEPEZIL HYDROCHLORIDE SCH MG: 10 TABLET, FILM COATED ORAL at 21:37

## 2018-06-29 RX ADMIN — HEPARIN SODIUM SCH UNIT: 5000 INJECTION, SOLUTION INTRAVENOUS; SUBCUTANEOUS at 06:17

## 2018-06-29 RX ADMIN — INSULIN ASPART SCH UNIT: 100 INJECTION, SOLUTION INTRAVENOUS; SUBCUTANEOUS at 12:04

## 2018-06-29 RX ADMIN — ATORVASTATIN CALCIUM SCH MG: 10 TABLET, FILM COATED ORAL at 21:37

## 2018-06-29 RX ADMIN — HEPARIN SODIUM SCH: 5000 INJECTION, SOLUTION INTRAVENOUS; SUBCUTANEOUS at 14:14

## 2018-06-29 RX ADMIN — INSULIN ASPART SCH UNIT: 100 INJECTION, SOLUTION INTRAVENOUS; SUBCUTANEOUS at 07:50

## 2018-06-29 RX ADMIN — VANCOMYCIN HYDROCHLORIDE SCH AMP: 250 CAPSULE ORAL at 01:17

## 2018-06-29 RX ADMIN — ZOLPIDEM TARTRATE PRN MG: 5 TABLET, COATED ORAL at 21:44

## 2018-06-29 RX ADMIN — LEVETIRACETAM SCH MG: 500 TABLET, FILM COATED ORAL at 21:37

## 2018-06-29 RX ADMIN — PANTOPRAZOLE SODIUM SCH MG: 40 TABLET, DELAYED RELEASE ORAL at 10:08

## 2018-06-29 NOTE — HP
CHIEF COMPLAINT: tremors and fever





PCP: Dr. Ziegler





HISTORY OF PRESENT ILLNESS:





68F w/ hx of severe dementia (non-verbal at baseline), recurrent UTIs, C.diff, 

IDDM, CAD, HLD, HTN, and NHL s/p chemo () presenting with full body tremors 

and fever since last night. Per pt's son, pt was recently hospitalized for 

c.diff and UTI about 5 weeks ago. Pt was discharged with a PICC line and 

received another 2 weeks of IV abx. Son states that the C. diff "did not clear" 

and so the pt was started on po vancomycin and bactrim which she has been 

taking for past 10 days or so. At dinner last night, he and his sister noticed 

that the pt began demonstrating full body tremors, a sign that in the past has 

always been a sign of her infections. They took pt's temp, and it was 100.7. 

Son denies sick contacts, recent travel, SOB, chest pain, nausea, and emesis. 

Pt always has loose stools now, and there hasn't been a change in consistency 

recently. 





ER course was notable for:


(1) fever, leukocytosis, lactic acidosis


(2) UA


(3) CT findings





Recent Travel: denies





PAST MEDICAL HISTORY:





as stated above





PAST SURGICAL HISTORY:





cardiac stent


left ureteral stent and removal





Social History:


Smokin+ pack years, quit years ago


Alcohol: denies


Drugs: denies





Pt lives in home in [place] with  and son.





Family History:





mother- stroke, parkinson's


father- unsure





Allergies





Penicillins Allergy (Verified 18 18:50)


 








HOME MEDICATIONS:


 Home Medications











 Medication  Instructions  Recorded


 


Metformin HCl 1,000 mg PO BIDAC 14


 


Metoprolol Tartrate [Lopressor -] 50 mg PO DAILY 14


 


Pravastatin Sodium 10 mg PO HS 18


 


Amlodipine Besylate [Norvasc -] 5 mg PO DAILY 18


 


Donepezil HCl 10 mg PO HS 18


 


Insulin Glargine,Hum.rec.anlog 35 unit SQ DAILY 18





[Lantus Solostar]  


 


Pantoprazole Sodium [Protonix] 40 mg PO DAILY 18


 


Zolpidem Tartrate [Ambien] 10 mg PO HS 18


 


Ertapenem Sodium - 1 Gram [Invanz 1 gm IVPB ONCE #21 bag 18





(Pre-Docked)]  


 


Picc Line Flush [Picc Line Flush -] 8 ml IVPUSH PRN PRN  ml 18


 


traZODone HCL [Desyrel -] 50 mg PO HS  tablet 18








REVIEW OF SYSTEMS


CONSTITUTIONAL: 


Absent:  loss of appetite, weight change


present: fevers, full body tremors


HEENT: 


Absent:  rhinorrhea, nasal congestion, throat pain, throat swelling, difficulty 

swallowing, mouth swelling, ear pain, eye pain, visual changes


CARDIOVASCULAR: 


Absent: chest pain, syncope, palpitations, irregular heart rate, lightheadedness

, peripheral edema


RESPIRATORY: 


Absent: cough, shortness of breath, dyspnea with exertion, orthopnea, wheezing, 

stridor, hemoptysis


GASTROINTESTINAL:


Absent: abdominal pain, abdominal distension, nausea, vomiting, constipation, 

melena, hematochezia


present: diarrhea


GENITOURINARY: 


Absent: dysuria, frequency, urgency, hesitancy, hematuria, flank pain, genital 

pain


MUSCULOSKELETAL: 


Absent: myalgia, arthralgia, joint swelling, back pain, neck pain


SKIN: 


Absent: rash, itching, pallor


HEMATOLOGIC/IMMUNOLOGIC: 


Absent: easy bleeding, easy bruising, lymphadenopathy, frequent infections


ENDOCRINE:


Absent: unexplained weight gain, unexplained weight loss, heat intolerance, 

cold intolerance


NEUROLOGIC: 


Absent: headache, focal weakness or paresthesias, dizziness, unsteady gait, 

seizure, mental status changes, bladder or bowel incontinence


PSYCHIATRIC: 


Absent: anxiety, depression, suicidal or homicidal ideation, hallucinations.








PHYSICAL EXAMINATION


 Vital Signs - 24 hr











  18





  20:00


 


Temperature 102.0 F H


 


Pulse Rate 81


 


Respiratory 18





Rate 


 


Blood Pressure 150/73


 


O2 Sat by Pulse 100





Oximetry (%) 











GENERAL: elderly female, awake, alert, non-verbal, does not follow commands


HEENT: NC, AT


LUNGS: Breath sounds equal, clear to auscultation bilaterally. No wheezes, and 

no crackles. No accessory muscle use.


HEART: Regular rate and rhythm, normal S1 and S2 without murmur, rub or gallop.


ABDOMEN: soft, ND, normoactive BS


MSK: no peripheral edema. 


NEUROLOGICAL:  unable to assess due to dementia





 Laboratory Results - last 24 hr











  18





  20:12 20:12 20:23


 


WBC    13.9 H


 


RBC    4.53


 


Hgb    12.3


 


Hct    37.1  D


 


MCV    81.8


 


MCH    27.1


 


MCHC    33.1


 


RDW    14.8


 


Plt Count    216


 


MPV    8.7  D


 


Absolute Neuts (auto)    11.8


 


Neutrophils %    85.5 H D


 


Lymphocytes %    8.0  D


 


Monocytes %    5.8


 


Eosinophils %    0.4


 


Basophils %    0.3


 


Nucleated RBC %    0


 


PT with INR   


 


INR   


 


PTT (Actin FS)   


 


VBG pH  7.42  


 


POC VBG pCO2  29.7 L  


 


POC VBG pO2  168.0 H* D  


 


Mixed VBG HCO3  18.8 L  


 


Sodium   


 


Potassium   


 


Chloride   


 


Carbon Dioxide   


 


Anion Gap   


 


BUN   


 


Creatinine   


 


Creat Clearance w eGFR   


 


Random Glucose   


 


Lactic Acid   3.0 H* 


 


Calcium   


 


Total Bilirubin   


 


AST   


 


ALT   


 


Alkaline Phosphatase   


 


Creatine Kinase   


 


CK-MB (CK-2)   


 


Troponin I   


 


Total Protein   


 


Albumin   


 


Urine Color   


 


Urine Appearance   


 


Urine pH   


 


Ur Specific Gravity   


 


Urine Protein   


 


Urine Glucose (UA)   


 


Urine Ketones   


 


Urine Blood   


 


Urine Nitrite   


 


Urine Bilirubin   


 


Urine Urobilinogen   


 


Ur Leukocyte Esterase   


 


Urine WBC (Auto)   


 


Urine RBC (Auto)   


 


Ur Epithelial Cells   


 


Urine Bacteria   


 


Urine Mucus   


 


Blood Type   


 


Antibody Screen   














  18





  20:23 20:23 20:23


 


WBC   


 


RBC   


 


Hgb   


 


Hct   


 


MCV   


 


MCH   


 


MCHC   


 


RDW   


 


Plt Count   


 


MPV   


 


Absolute Neuts (auto)   


 


Neutrophils %   


 


Lymphocytes %   


 


Monocytes %   


 


Eosinophils %   


 


Basophils %   


 


Nucleated RBC %   


 


PT with INR  10.80  


 


INR  0.96  


 


PTT (Actin FS)  26.4  


 


VBG pH   


 


POC VBG pCO2   


 


POC VBG pO2   


 


Mixed VBG HCO3   


 


Sodium   134 L 


 


Potassium   4.8 


 


Chloride   100 


 


Carbon Dioxide   23 


 


Anion Gap   11 


 


BUN   24 H 


 


Creatinine   0.9 


 


Creat Clearance w eGFR   > 60 


 


Random Glucose   147 H 


 


Lactic Acid   


 


Calcium   8.9 


 


Total Bilirubin   0.3 


 


AST   14 L 


 


ALT   28 


 


Alkaline Phosphatase   105 


 


Creatine Kinase   62 


 


CK-MB (CK-2)   1.92 


 


Troponin I   


 


Total Protein   7.9 


 


Albumin   3.9 


 


Urine Color   


 


Urine Appearance   


 


Urine pH   


 


Ur Specific Gravity   


 


Urine Protein   


 


Urine Glucose (UA)   


 


Urine Ketones   


 


Urine Blood   


 


Urine Nitrite   


 


Urine Bilirubin   


 


Urine Urobilinogen   


 


Ur Leukocyte Esterase   


 


Urine WBC (Auto)   


 


Urine RBC (Auto)   


 


Ur Epithelial Cells   


 


Urine Bacteria   


 


Urine Mucus   


 


Blood Type    Cancelled


 


Antibody Screen    Cancelled














  18





  20:23 22:46


 


WBC  


 


RBC  


 


Hgb  


 


Hct  


 


MCV  


 


MCH  


 


MCHC  


 


RDW  


 


Plt Count  


 


MPV  


 


Absolute Neuts (auto)  


 


Neutrophils %  


 


Lymphocytes %  


 


Monocytes %  


 


Eosinophils %  


 


Basophils %  


 


Nucleated RBC %  


 


PT with INR  


 


INR  


 


PTT (Actin FS)  


 


VBG pH  


 


POC VBG pCO2  


 


POC VBG pO2  


 


Mixed VBG HCO3  


 


Sodium  


 


Potassium  


 


Chloride  


 


Carbon Dioxide  


 


Anion Gap  


 


BUN  


 


Creatinine  


 


Creat Clearance w eGFR  


 


Random Glucose  


 


Lactic Acid  


 


Calcium  


 


Total Bilirubin  


 


AST  


 


ALT  


 


Alkaline Phosphatase  


 


Creatine Kinase  


 


CK-MB (CK-2)  


 


Troponin I  < 0.02 


 


Total Protein  


 


Albumin  


 


Urine Color   Straw


 


Urine Appearance   Clear


 


Urine pH   5.0


 


Ur Specific Gravity   1.009


 


Urine Protein   Negative


 


Urine Glucose (UA)   Negative


 


Urine Ketones   Negative


 


Urine Blood   Negative


 


Urine Nitrite   Negative


 


Urine Bilirubin   Negative


 


Urine Urobilinogen   Negative


 


Ur Leukocyte Esterase   1+ H


 


Urine WBC (Auto)   13


 


Urine RBC (Auto)   1


 


Ur Epithelial Cells   Rare


 


Urine Bacteria   Rare


 


Urine Mucus   Rare


 


Blood Type  


 


Antibody Screen  








CXR: negative for any acute pathology


CTAP: mild left hydronephrosis and silvia-nephric edema, possible small calculi 

in distal left ureter.


EKG: NSR, QTc of 462





ASSESSMENT/PLAN:


68F w/ hx of severe dementia (non-verbal at baseline), recurrent UTIs, C.diff, 

IDDM, CAD, HLD, HTN, and NHL s/p chemo () presenting with full body tremors 

and fever since last night.





#severe sepsis


-fever, leukocytosis, lactic acidosis


-likely 2/2 c. diff vs. UTI (UA with 1+ LE and 13 wbc)


-ID on board, Dr. Calderón, f/u recs


-zosyn 4.5 q8h, IV flagyl 500 q8h, and oral vanc 125 q6h


-f/u Bcx and Ucx


-APAP for fever


-NS @ 75cc/hr


-trend lactic acid


-CTAP: mild left hydronephrosis and silvia-nephric edema, possible small calculi 

in distal left ureter.


-urology consulted, Dr. Lees, f/u recs


-Confirm all meds





#seizure


-2/2 infection during last hospitalization? unclear


-per son, pt has been on keppra since then. does not know dose


-started pt on keppra 750 BID


-f/u keppra levels





#IDDM


-home metformin held


-BGM and ISS ACHS





#HLD


-continue home pravastatin





#HTN


-continue home lopressor and norvasc





#dementia


-continue home donepezil





#GERD


-continue home protonix





#Insomnia


-trazodone PRN





#FEN/ppx


-NS @ 70cc/hr


-hyponatremia


-sodium/diabetic diet


-protonix


-heparin SQ





#dispo


-admit to med/surg





Case discussed with attending, Dr. Vickers.





-Kevin Anaya MD PGY1

















Visit type





- Emergency Visit


Emergency Visit: Yes


Care time: The patient presented to the Emergency Department on the above date 

and was hospitalized for further evaluation of their emergent condition.





- New Patient


This patient is new to me today: Yes


Date on this admission: 18





- Critical Care


Critical Care patient: No





Hospitalist Screening





- Colonoscopy Questionnaire


Colonoscopy Questionnaire: 





Colonoscopy Questionnaire








-   Patient:


50 - 75 years old and never had a screening colonoscopy: Unknown


History of colon or rectal polyps, or CA: Unknown


History of IBD, Crohn's disease or UC: Unknown


History of abdominal radiation therapy as a child: Unknown





-   Relative:


1 with colon or rectal CA, or polyps at age 60 or younger: Unknown


Colon or rectal CA diagnosed at age 45 or younger: Unknown


Multiple relatives with colon or rectal CA: Unknown





-   Outcome:


Screening Result: Negative Screen

## 2018-06-29 NOTE — EKG
Test Reason : 

Blood Pressure : ***/*** mmHG

Vent. Rate : 082 BPM     Atrial Rate : 082 BPM

   P-R Int : 176 ms          QRS Dur : 092 ms

    QT Int : 396 ms       P-R-T Axes : 046 -46 071 degrees

   QTc Int : 462 ms

 

*** POOR DATA QUALITY, INTERPRETATION MAY BE ADVERSELY AFFECTED

NORMAL SINUS RHYTHM

LEFT AXIS DEVIATION

INCOMPLETE RIGHT BUNDLE BRANCH BLOCK

MODERATE VOLTAGE CRITERIA FOR LVH, MAY BE NORMAL VARIANT

CANNOT RULE OUT SEPTAL INFARCT , AGE UNDETERMINED

ABNORMAL ECG

 

Confirmed by TIP WAY MD ([id number]) on 6/29/2018 9:11:50 AM

 

Referred By:             Confirmed By:TIP WAY MD

## 2018-06-29 NOTE — PN
Teaching Attending Note


Name of Resident: Kevin Anaya





ATTENDING PHYSICIAN STATEMENT





I saw and evaluated the patient.


I reviewed the resident's note and discussed the case with the resident.


I agree with the resident's findings and plan as documented.








SUBJECTIVE:


Patient is a 68 year old female, with a significant PMH of recently diagnosed 

C. Difficile concurrent with Klebsiella UTI (on prescribed PO Vancomycin and 

Bactrim), seizures in the setting of recent infection (started on Keppra 2 

weeks ago), severe dementia (non verbal at baseline), IDDM, treated non Hodgkin'

s lymphoma, coronary artery disease, hyperlipidemia, and hypertension who 

presents to the ER with increased tremors, generalized malaise and measured 

fever of 100.7 earlier today. As per patients family at bedside, the patient 

has been taking all of her medications as prescribed. However, they dont have 

the list of her medications and the son does not know the doses of her 

medications. 





OBJECTIVE:


Alert, but unable to follow commands. Lying quietly in bed.





 Vital Signs











 Period  Temp  Pulse  Resp  BP Sys/Silver  Pulse Ox


 


 Last 24 Hr  102.0 F  81  18  150/73  100











HEENT: No Jaundice, eye redness or discharge, PERRLA. Normocephalic, 

atraumatic. External ears are normal. No nasal discharge.


Neck: Supple, nontender. No palpable adenopathy or thyromegaly. No JVD


Chest: Good effort. Clear to auscultation and percussion.


Heart: Regular. No S3, rub or murmur


Abdomen: Not distended, soft, mild periumblical tenderness and no HSM. No 

rebound or guarding.  Normoactive bowel sounds.


Ext: Peripheral pulses intact. No leg edema.


Skin: Warm and dry. No petechiae, rash or ecchymosis.


Neuro: Alert. Dementia. Unable to follow commands. Nonverbal. Moves all limbs.





 Current Medications











Generic Name Dose Route Start Last Admin





  Trade Name Freq  PRN Reason Stop Dose Admin


 


Acetaminophen  650 mg  06/29/18 03:21  





  Tylenol -  PO   





  Q4H PRN   





  FEVER   





     





     





     


 


Amlodipine Besylate  5 mg  06/29/18 10:00  





  Norvasc -  PO   





  DAILY FESTUS   





     





     





     





     


 


Diphenhydramine HCl  50 mg  06/29/18 03:29  





  Benadryl -  PO   





  ONCE PRN   





  pruritis/rash   





     





     





     


 


Donepezil HCl  10 mg  06/29/18 22:00  





  Aricept -  PO   





  HS FESTUS   





     





     





     





     


 


Heparin Sodium (Porcine)  5,000 unit  06/29/18 10:00  





  Heparin -  SQ   





  Q8H-IV FESTUS   





     





     





     





     


 


Metronidazole  500 mg in 100 mls @ 100 mls/hr  06/28/18 21:45  06/29/18 02:03





  Flagyl 500mg Premixed Ivpb -  IVPB   100 mls/hr





  Q8H-IV FESTUS   Administration





     





     





     





     


 


Sodium Chloride  1,000 mls @ 75 mls/hr  06/29/18 03:30  06/29/18 03:32





  Normal Saline -  IV   75 mls/hr





  ASDIR FESTUS   Administration





     





     





     





     


 


Piperacillin Sod/Tazobactam  100 mls @ 200 mls/hr  06/29/18 06:00  





  Sod 4.5 gm/ Dextrose  IVPB   





  Q8H-IV FESTUS   





     





     





  Protocol   





     


 


Insulin Aspart  0 vial  06/29/18 07:00  





  Novolog Vial Sliding Scale -  SQ   





  [facility]   





     





     





  Protocol   





     


 


Levetiracetam  750 mg  06/29/18 10:00  





  Keppra -  PO   





  BID FESTUS   





     





     





     





     


 


Metoprolol Tartrate  50 mg  06/29/18 10:00  





  Lopressor -  PO   





  DAILY [facility]   





     





     





     





     


 


Non-Formulary Medication  10 mg  06/29/18 22:00  





  Pravastatin Sodium [Pravastatin Sodium]  PO   





  HS FESTUS   





     





     





     





     


 


Pantoprazole Sodium  40 mg  06/29/18 10:00  





  Protonix -  PO   





  DAILY FESTUS   





     





     





     





     


 


Trazodone HCl  50 mg  06/29/18 03:25  





  Desyrel -  PO   





  HS PRN   





  INSOMNIA   





     





     





     


 


Vancomycin HCl  125 mg  06/29/18 00:00  06/29/18 01:17





  Vancomycin Oral Solution  PO   1 amp





  Q6HPO FESTUS   Administration





     





     





     





     








 Home Medications











 Medication  Instructions  Recorded


 


Metformin HCl 1,000 mg PO BIDAC 09/25/14


 


Metoprolol Tartrate [Lopressor -] 50 mg PO DAILY 09/25/14


 


Pravastatin Sodium 10 mg PO HS 03/28/18


 


Amlodipine Besylate [Norvasc -] 5 mg PO DAILY 03/29/18


 


Donepezil HCl 10 mg PO HS 03/29/18


 


Insulin Glargine,Hum.rec.anlog 35 unit SQ DAILY 03/29/18





[Lantus Solostar]  


 


Pantoprazole Sodium [Protonix] 40 mg PO DAILY 03/29/18


 


Zolpidem Tartrate [Ambien] 10 mg PO HS 03/29/18


 


Ertapenem Sodium - 1 Gram [Invanz 1 gm IVPB ONCE #21 bag 05/04/18





(Pre-Docked)]  


 


Picc Line Flush [Picc Line Flush -] 8 ml IVPUSH PRN PRN  ml 05/04/18


 


traZODone HCL [Desyrel -] 50 mg PO HS  tablet 05/04/18








 Abnormal Lab Results











  06/28/18 06/28/18 06/28/18





  20:12 20:12 20:23


 


WBC    13.9 H


 


Neutrophils %    85.5 H D


 


POC VBG pCO2  29.7 L  


 


POC VBG pO2  168.0 H* D  


 


Mixed VBG HCO3  18.8 L  


 


Sodium   


 


BUN   


 


Random Glucose   


 


Lactic Acid   3.0 H* 


 


AST   


 


Ur Leukocyte Esterase   














  06/28/18 06/28/18





  20:23 22:46


 


WBC  


 


Neutrophils %  


 


POC VBG pCO2  


 


POC VBG pO2  


 


Mixed VBG HCO3  


 


Sodium  134 L 


 


BUN  24 H 


 


Random Glucose  147 H 


 


Lactic Acid  


 


AST  14 L 


 


Ur Leukocyte Esterase   1+ H











ASSESSMENT AND PLAN:


1. Sepsis due to incompletely treated C. Diff colitis - The CT of the abdomen 

does not show colitis or diverticulitis, but her adherence to the antibiotics 

regimen at home is uncertain. She has only 13 WBCs in the urine, lactic 

acidosis and left hydronephrosis and perinephriic edema on CT scan. A tiny 

calculus was also noted. Blood and urine cultures have been sent and treatment 

started with Oral vancomycin 125 mg q 6 hours; IV flagyl 500 m q 8h and zosyn 

4.5 gm q 8 hours as per ID consult. Will continue IV NS after her fluid bolus 

and trend her lactic acid level. Consult urology for mild left hydronephrosis.





2. Seizure disorder - Will check keppra level and continue keppra at 750 mg bid 

- until family confirms her usual dose.





3. DM - For now, we will hold her usual diabetes drugs and implement sliding 

scale insulin regimen. Provide comprehensive diabetes care with patient/family 

teaching and counseling about the importance of euglycemia, eye care and foot 

care.





4. Dementia - Continue comprehensive care, frequent repositioning and fall 

precautions.





5. DVT prophylaxis - Heparin 5000u sq tid.      





6. Advance directives - Full code

## 2018-06-29 NOTE — PN
Physical Exam: 


SUBJECTIVE: Briefly, 67yo F who presented with rigors with concern of continued 

C.Diff infection since May and new UTI 2/2 to nephrolithiasis. According to 

family at bedside, baseline is nonverbal, severely demented, and does not 

listen to commands. 








OBJECTIVE:





 Vital Signs











 Period  Temp  Pulse  Resp  BP Sys/Silver  Pulse Ox


 


 Last 24 Hr  98.9 F-103.1 F  59-93  18-24  124-175/63-91  95-99











GENERAL: Mild discomfort, awake, nonverbal, noncooperating with commands


HEENT: NC/AT, GERTRUDE, dry-moist mucosa


NECK: Soft, no lymphadenopathy, no jvd


LUNGS: Poor inspiratory effort, no overt wheezes, no crackles, no accessory 

muscle use. 


HEART: RRR, S1, S2 without murmur, rub or gallop.


ABDOMEN: Soft, nondistended, hyperactive BS, no facial grimmacing with palpation

, no guarding.


EXTREMITIES: 2+ pulses, warm, well-perfused, no edema. 


SKIN: Warm, dry, no rashes or lesions noted











 Laboratory Results - last 24 hr











  06/28/18 06/28/18 06/28/18





  20:12 20:23 22:46


 


WBC   


 


RBC   


 


Hgb   


 


Hct   


 


MCV   


 


MCH   


 


MCHC   


 


RDW   


 


Plt Count   


 


MPV   


 


Absolute Neuts (auto)   


 


Neutrophils %   


 


Lymphocytes %   


 


Monocytes %   


 


Eosinophils %   


 


Basophils %   


 


Nucleated RBC %   


 


VBG pH  7.42  


 


POC VBG pCO2  29.7 L  


 


POC VBG pO2  168.0 H* D  


 


Mixed VBG HCO3  18.8 L  


 


Sodium   134 L 


 


Potassium   4.8 


 


Chloride   100 


 


Carbon Dioxide   23 


 


Anion Gap   11 


 


BUN   24 H 


 


Creatinine   0.9 


 


Creat Clearance w eGFR   > 60 


 


POC Glucometer   


 


Random Glucose   147 H 


 


Lactic Acid   


 


Calcium   8.9 


 


Total Bilirubin   0.3 


 


AST   14 L 


 


ALT   28 


 


Alkaline Phosphatase   105 


 


Creatine Kinase   62 


 


CK-MB (CK-2)   1.92 


 


Total Protein   7.9 


 


Albumin   3.9 


 


Urine Color    Straw


 


Urine Appearance    Clear


 


Urine pH    5.0


 


Ur Specific Gravity    1.009


 


Urine Protein    Negative


 


Urine Glucose (UA)    Negative


 


Urine Ketones    Negative


 


Urine Blood    Negative


 


Urine Nitrite    Negative


 


Urine Bilirubin    Negative


 


Urine Urobilinogen    Negative


 


Ur Leukocyte Esterase    1+ H


 


Urine WBC (Auto)    13


 


Urine RBC (Auto)    1


 


Ur Epithelial Cells    Rare


 


Urine Bacteria    Rare


 


Urine Mucus    Rare














  06/29/18 06/29/18 06/29/18





  04:01 06:30 06:30


 


WBC   10.0 


 


RBC   4.51 


 


Hgb   12.4 


 


Hct   36.7 


 


MCV   81.3 


 


MCH   27.6 


 


MCHC   33.9 


 


RDW   14.8 


 


Plt Count   168  D 


 


MPV   7.9 


 


Absolute Neuts (auto)   9.1 


 


Neutrophils %   90.5 H 


 


Lymphocytes %   5.7 L D 


 


Monocytes %   3.4 L 


 


Eosinophils %   0.1 


 


Basophils %   0.3 


 


Nucleated RBC %   0 


 


VBG pH   


 


POC VBG pCO2   


 


POC VBG pO2   


 


Mixed VBG HCO3   


 


Sodium    139


 


Potassium    4.3


 


Chloride    105


 


Carbon Dioxide    24


 


Anion Gap    10


 


BUN    13


 


Creatinine    0.9


 


Creat Clearance w eGFR    > 60


 


POC Glucometer   


 


Random Glucose    162 H


 


Lactic Acid  1.9  


 


Calcium    8.2 L


 


Total Bilirubin    0.6


 


AST    15


 


ALT    25


 


Alkaline Phosphatase    81  D


 


Creatine Kinase   


 


CK-MB (CK-2)   


 


Total Protein    6.8


 


Albumin    3.2 L


 


Urine Color   


 


Urine Appearance   


 


Urine pH   


 


Ur Specific Gravity   


 


Urine Protein   


 


Urine Glucose (UA)   


 


Urine Ketones   


 


Urine Blood   


 


Urine Nitrite   


 


Urine Bilirubin   


 


Urine Urobilinogen   


 


Ur Leukocyte Esterase   


 


Urine WBC (Auto)   


 


Urine RBC (Auto)   


 


Ur Epithelial Cells   


 


Urine Bacteria   


 


Urine Mucus   














  06/29/18 06/29/18 06/29/18





  07:45 12:02 16:56


 


WBC   


 


RBC   


 


Hgb   


 


Hct   


 


MCV   


 


MCH   


 


MCHC   


 


RDW   


 


Plt Count   


 


MPV   


 


Absolute Neuts (auto)   


 


Neutrophils %   


 


Lymphocytes %   


 


Monocytes %   


 


Eosinophils %   


 


Basophils %   


 


Nucleated RBC %   


 


VBG pH   


 


POC VBG pCO2   


 


POC VBG pO2   


 


Mixed VBG HCO3   


 


Sodium   


 


Potassium   


 


Chloride   


 


Carbon Dioxide   


 


Anion Gap   


 


BUN   


 


Creatinine   


 


Creat Clearance w eGFR   


 


POC Glucometer  [phone]  [phone]  [phone]


 


Random Glucose   


 


Lactic Acid   


 


Calcium   


 


Total Bilirubin   


 


AST   


 


ALT   


 


Alkaline Phosphatase   


 


Creatine Kinase   


 


CK-MB (CK-2)   


 


Total Protein   


 


Albumin   


 


Urine Color   


 


Urine Appearance   


 


Urine pH   


 


Ur Specific Gravity   


 


Urine Protein   


 


Urine Glucose (UA)   


 


Urine Ketones   


 


Urine Blood   


 


Urine Nitrite   


 


Urine Bilirubin   


 


Urine Urobilinogen   


 


Ur Leukocyte Esterase   


 


Urine WBC (Auto)   


 


Urine RBC (Auto)   


 


Ur Epithelial Cells   


 


Urine Bacteria   


 


Urine Mucus   








Active Medications











Generic Name Dose Route Start Last Admin





  Trade Name Freq  PRN Reason Stop Dose Admin


 


Acetaminophen  650 mg  06/29/18 03:21  06/29/18 06:30





  Tylenol -  PO   650 mg





  Q4H PRN   Administration





  FEVER   





     





     





     


 


Aspirin  325 mg  06/30/18 10:00  





  Asa -  PO   





  Q48H FESTUS   





     





     





     





     


 


Atorvastatin Calcium  10 mg  06/29/18 22:00  





  Lipitor -  PO   





  HS [facility]   





     





     





     





     


 


Diphenhydramine HCl  50 mg  06/29/18 03:29  





  Benadryl -  PO   





  ONCE PRN   





  pruritis/rash   





     





     





     


 


Donepezil HCl  10 mg  06/29/18 22:00  





  Aricept -  PO   





  HS FESTUS   





     





     





     





     


 


Heparin Sodium (Porcine)  5,000 unit  06/29/18 14:00  06/29/18 14:14





  Heparin -  SQ   Not Given





  TID FESTUS   





     





     





     





     


 


Sodium Chloride  1,000 mls @ 75 mls/hr  06/29/18 03:30  06/29/18 03:32





  Normal Saline -  IV   75 mls/hr





  ASDIR FESTUS   Administration





     





     





     





     


 


Ertapenem 1 gm/ Sodium  50 mls @ 100 mls/hr  06/30/18 10:00  





  Chloride  IVPB   





  DAILY FESTUS   





     





     





  Protocol   





     


 


Insulin Aspart  1 vial  06/29/18 16:30  06/29/18 17:01





  Novolog Vial Sliding Scale -  SQ   4 unit





  TIDAC FESTUS   Administration





     





     





  Protocol   





     


 


Levetiracetam  500 mg  06/29/18 22:00  





  Keppra -  PO   





  BID FESTUS   





     





     





     





     


 


Metoprolol Tartrate  50 mg  06/29/18 10:00  06/29/18 10:08





  Lopressor -  PO   50 mg





  DAILY FESTUS   Administration





     





     





     





     


 


Pantoprazole Sodium  40 mg  06/29/18 10:00  06/29/18 10:08





  Protonix -  PO   40 mg





  DAILY FESTUS   Administration





     





     





     





     


 


Trazodone HCl  50 mg  06/29/18 03:25  





  Desyrel -  PO   





  HS PRN   





  INSOMNIA   





     





     





     


 


Vancomycin HCl  125 mg  06/29/18 00:00  06/29/18 18:10





  Vancomycin Oral Solution  PO   125 amp





  Q6HPO FESTUS   Administration





     





     





     





     


 


Zolpidem Tartrate  5 mg  06/29/18 16:07  





  Ambien -  PO   





  HS PRN   





  INSOMNIA   





     





     





     











ASSESSMENT/PLAN:


1) Severe sepsis 2/2 to unclear source


   --Most likely either UTI 2/2 to nephrolithiasis vs. Continued C. Diff 

infection


   --CT w/o notable colitis at this point


   --Discontinue Zosyn


   --Start ertepenem given previous ESBL UTI sensitive to Zosyn


   --ID consulted


   --Urology consulted


      --No indication for intervention due to nonobstructing stones


   --Continue NS @75cc/hr





2) DM


   --BGM


   --Pt on Lantus 30U AM at home


      --Will start 15U in morning due to NPO right now


   --ISS





3) History of seizures


   --Home dose confirmed with Keppra 500mg BID


      --Continue





4) HTN


   --Take Norvasc 5mg and Lopressor 50mg daily at home


   --Holding Norvasc at this time due to possibility of becoming unstable in 

sepsis; can add later 


   --Continue Lopressor





FEN:


   NS@75cc/hr


   none currently


   NPO





PPX:


   DVT - Heparin SQ





Dispo: continue M/S


Case discussed with Dr. Vang and pt's son and daughter


Marlo Salas, DO - IM PGY-1





Visit type





- Emergency Visit


Emergency Visit: No





- New Patient


This patient is new to me today: Yes


Date on this admission: 06/29/18





- Critical Care


Critical Care patient: No

## 2018-06-29 NOTE — CON.GU
Consult


Consult Specialty:: 


Referred by:: ED


Reason for Consultation:: hydronephrosis, UTI





- History of Present Illness


Chief Complaint: hydronephrosis, UTI


History of Present Illness: 





68 year old woman with recurrent UTIs and C Diff colitis, comes to the ER with 

high fevers and abdominal pain. SHe recently had a ureteral stent removed.  

Renal scan shows no obstruction. On this admission. CT shows minimal left sided 

dilation without stone.  Urine appears clean on UA.  [id number]





- History Source


History Provided By: Medical Record


Limitations to Obtaining History: Dementia





- Past Medical History


CNS: Yes: Dementia


Cardio/Vascular: Yes: HTN, Hyperlipdemia


Renal/: Yes: UTI, Other (UPJ obstruction)





- Alcohol/Substance Use


Hx Alcohol Use: No


History of Substance Use: reports: None





- Smoking History


Smoking history: Never smoked


Have you smoked in the past 12 months: No


Aproximately how many cigarettes per day: 0


If you are a former smoker, when did you quit?: 6 months ago





- Social History


Usual Living Arrangement: With Child


ADL: Independent


History of Recent Travel: No





Home Medications





- Allergies


Allergies/Adverse Reactions: 


 Allergies











Allergy/AdvReac Type Severity Reaction Status Date / Time


 


Penicillins Allergy   Verified 06/29/18 08:31














- Home Medications


Home Medications: 


Ambulatory Orders





Metformin HCl 1,000 mg PO BIDAC 09/25/14 


Metoprolol Tartrate [Lopressor -] 50 mg PO DAILY 09/25/14 


Pravastatin Sodium 10 mg PO HS 03/28/18 


Amlodipine Besylate [Norvasc -] 5 mg PO DAILY 03/29/18 


Donepezil HCl 10 mg PO HS 03/29/18 


Insulin Glargine,Hum.rec.anlog [Lantus Solostar] 35 unit SQ DAILY 03/29/18 


Pantoprazole Sodium [Protonix] 40 mg PO DAILY 03/29/18 


Zolpidem Tartrate [Ambien] 10 mg PO HS 03/29/18 


traZODone HCL [Desyrel -] 50 mg PO HS  tablet 05/04/18 











Review of Systems





- Review of Systems


Genitourinary: reports: No Symptoms





Physical Exam-


Vital Signs: 


 Vital Signs











Temperature  100.2 F H  06/29/18 08:30


 


Pulse Rate  75   06/29/18 08:30


 


Respiratory Rate  18   06/29/18 08:30


 


Blood Pressure  131/63   06/29/18 08:30


 


O2 Sat by Pulse Oximetry (%)  95   06/29/18 08:30











Renal/: No: Bladder Distention, CVA Tenderness - Left, CVA Tenderness - Right

, [place] Present, Hematuria


Labs: 


 CBC, BMP





 06/29/18 06:30 





 06/29/18 06:30 











Imaging





- Results


Cat Scan: Report Reviewed





Problem List





- Problems


(1) Hydroureteronephrosis


Assessment/Plan: 


only mild dilation without stones. Renal scan earlier this week confirms no 

functional obstruction. UA appears clean, follow urine culture, however this 

clinical picture not likely a UTI or urosepsis. 


Code(s): N13.30 - UNSPECIFIED HYDRONEPHROSIS

## 2018-06-29 NOTE — CON.ID
Consult


Consult Specialty:: Infectious Diseases


Reason for Consultation:: R/O sepsis





- History of Present Illness


Chief Complaint: fever


History of Present Illness: 





Patient is a 68 yr old female admitted with fever and chills last night. She 

has long Hx of recurrent UTI's in the past.


Hx Strep Bacteremia/UTI late lsat year, s/p stent placement and eventual 

removal. Hospital a few months ago for MDR Klebsiella UTI.


Has had prolonged courses of Abx, thus, subsequent CDiff Colitis - currently on 

PO Vanco. Last night, noted to be more lethargic and had fever and chills. She 

was sent to the ED, where w/up done and admitted. Started on Invanz.





- History Source


History Provided By: Family Member (Daughter)





- Past Medical History


CNS: Yes: Dementia


Cardio/Vascular: Yes: HTN, Hyperlipdemia


Renal/: Yes: UTI, Other (UPJ obstruction)





- Alcohol/Substance Use


Hx Alcohol Use: No


History of Substance Use: reports: None





- Smoking History


Smoking history: Never smoked


Have you smoked in the past 12 months: No


Aproximately how many cigarettes per day: 0


If you are a former smoker, when did you quit?: 6 months ago





- Social History


Usual Living Arrangement: With Child


ADL: Independent


History of Recent Travel: No





Home Medications





- Allergies


Allergies/Adverse Reactions: 


 Allergies











Allergy/AdvReac Type Severity Reaction Status Date / Time


 


Penicillins Allergy   Verified 06/29/18 08:31














- Home Medications


Home Medications: 


Ambulatory Orders





Metformin HCl 1,000 mg PO BIDAC 09/25/14 


Metoprolol Tartrate [Lopressor -] 50 mg PO DAILY 09/25/14 


Pravastatin Sodium 10 mg PO HS 03/28/18 


Amlodipine Besylate [Norvasc -] 5 mg PO DAILY 03/29/18 


Donepezil HCl 10 mg PO HS 03/29/18 


Insulin Glargine,Hum.rec.anlog [Lantus Solostar] 30 unit SQ DAILY 03/29/18 


Pantoprazole Sodium [Protonix] 40 mg PO DAILY 03/29/18 


traZODone HCL [Desyrel -] 50 mg PO HS  tablet 05/04/18 


Aspirin [ASA -] 325 mg PO Q48H 06/29/18 


Zolpidem Tartrate [Ambien] 5 mg PO HS 06/29/18 


levETIRAcetam [Keppra -] 500 mg PO BID 06/29/18 











Family Disease History





- Family Disease History


Family History: Unable to Obtain





Physical Exam


Vital Signs: 


 Vital Signs











Temperature  99.1 F   06/29/18 19:18


 


Pulse Rate  84   06/29/18 19:18


 


Respiratory Rate  24   06/29/18 19:18


 


Blood Pressure  175/75   06/29/18 19:18


 


O2 Sat by Pulse Oximetry (%)  99   06/29/18 20:04











Constitutional: Yes: Well Nourished


Eyes: Yes: EOM Intact, PERRL


HENT: Yes: Normocephalic


Neck: Yes: Supple


Cardiovascular: Yes: Regular Rate and Rhythm


Respiratory: Yes: CTA Bilaterally


Gastrointestinal: Yes: Normal Bowel Sounds, Soft.  No: Tenderness


...Rectal Exam: Yes: Deferred


Renal/: No: CVA Tenderness - Left, CVA Tenderness - Right


Labs: 


 CBC, BMP





 06/29/18 06:30 





 06/29/18 06:30 











Imaging





- Results


Cat Scan: Report Reviewed





Problem List





- Problems


(1) C. difficile diarrhea


Code(s): A04.72 - ENTEROCOLITIS D/T CLOSTRIDIUM DIFFICILE, NOT SPCF AS RECUR   





(2) UTI (urinary tract infection)


Code(s): N39.0 - URINARY TRACT INFECTION, SITE NOT SPECIFIED   


Qualifiers: 


   Urinary tract infection type: site unspecified   Hematuria presence: without 

hematuria   Qualified Code(s): N39.0 - Urinary tract infection, site not 

specified   





Assessment/Plan





Pt with fever, Hx CDI, UTI





RFeviewed UA - no significant pyuria


DC Invanz and observe


Cont PO VAnco


Pan c/s for T> 102.0

## 2018-06-29 NOTE — PN
Teaching Attending Note


Name of Resident: Marlo Salas





ATTENDING PHYSICIAN STATEMENT





I saw and evaluated the patient.


I reviewed the resident's note and discussed the case with the resident.


I agree with the resident's findings and plan as documented.








SUBJECTIVE:


unabel to obtain hx form pt. had fever this am . per daughter, pt hd fever , 

adn looked lethargic. her diarrhea that was watery  improved after starting po 

vanco  10 days ago as ou tpt . now pasty not  watery. reports seziures ( 2 )  

as out pt after finishing her invanz treatment , and was started on Kepra as 

out patient by her neurologist.  she was started on po abx abut a week ago  for 

recurrence in her UTI as outpt . 








OBJECTIVE:


NAD , awake, non verbal, not cooperative .. round equal reactive pupils 


slightly dry MM. no JVD 


CV: RRR, no MRG lungs: CTAB 


Ext: no edema 


Abd: soft, TTP in RLQ. nl BS . no guarding . 


No decub ulcers on sacrum 





ASSESSMENT AND PLAN:





67 y/o lady with h/o  dementia , non verbal, treated Non Hodgkin's lymphhoma , 

DM , HTN, recent hospitalization for ESBL klebsiella UTI, recent seizures who 

presented with fever . 





1- Sepsis : source is unclear but could be  due to partially treated UTI ( on 

po abx x 1 week as out pt ).


CT did not show colitis , and actually her diarrhea has improved on Po vanco as 

out pt . 


has Nl cxray and no crackles on exam. No slin lesions or decubs 





- dc Zosyn and start ertapenem givenher h/o 


- cont po vanco for recent c diff 


- hold off flagyl for now 


- UA not impressive but was on abx as out patient. follwo urine cx 


- follow blood cx 


- ID consult pending 


- No indication for obstructionor need for urological procedure per uro 


- cont IVF. lactic normalized 





2- DM : cont SSI now .. will confirm her Lantus dose and probably start lower 

dose in am 





3- H/o recent seizures. Cont keppra . will confirm dose 





4- HTN: hold norvasc and cont BB in setting of sepsis . if stable can add 





5- DVT PX : heparin sq 





d/w daughter at bed sides. Meds to be confirmed

## 2018-06-30 LAB
ALBUMIN SERPL-MCNC: 2.8 G/DL (ref 3.4–5)
ALP SERPL-CCNC: 68 U/L (ref 45–117)
ALT SERPL-CCNC: 20 U/L (ref 12–78)
ANION GAP SERPL CALC-SCNC: 7 MMOL/L (ref 8–16)
AST SERPL-CCNC: 11 U/L (ref 15–37)
BILIRUB SERPL-MCNC: 0.4 MG/DL (ref 0.2–1)
BUN SERPL-MCNC: 11 MG/DL (ref 7–18)
CALCIUM SERPL-MCNC: 7.8 MG/DL (ref 8.5–10.1)
CHLORIDE SERPL-SCNC: 105 MMOL/L (ref 98–107)
CO2 SERPL-SCNC: 25 MMOL/L (ref 21–32)
CREAT SERPL-MCNC: 0.7 MG/DL (ref 0.55–1.02)
DEPRECATED RDW RBC AUTO: 15 % (ref 11.6–15.6)
GLUCOSE SERPL-MCNC: 237 MG/DL (ref 74–106)
HCT VFR BLD CALC: 32.1 % (ref 32.4–45.2)
HGB BLD-MCNC: 11 GM/DL (ref 10.7–15.3)
MAGNESIUM SERPL-MCNC: 1.8 MG/DL (ref 1.8–2.4)
MCH RBC QN AUTO: 27.9 PG (ref 25.7–33.7)
MCHC RBC AUTO-ENTMCNC: 34.4 G/DL (ref 32–36)
MCV RBC: 80.9 FL (ref 80–96)
PHOSPHATE SERPL-MCNC: 3 MG/DL (ref 2.5–4.9)
PLATELET # BLD AUTO: 155 K/MM3 (ref 134–434)
PMV BLD: 8.2 FL (ref 7.5–11.1)
POTASSIUM SERPLBLD-SCNC: 4 MMOL/L (ref 3.5–5.1)
PROT SERPL-MCNC: 6.1 G/DL (ref 6.4–8.2)
RBC # BLD AUTO: 3.96 M/MM3 (ref 3.6–5.2)
SODIUM SERPL-SCNC: 137 MMOL/L (ref 136–145)
WBC # BLD AUTO: 6.5 K/MM3 (ref 4–10)

## 2018-06-30 RX ADMIN — INSULIN ASPART SCH UNITS: 100 INJECTION, SOLUTION INTRAVENOUS; SUBCUTANEOUS at 11:17

## 2018-06-30 RX ADMIN — INSULIN ASPART SCH UNITS: 100 INJECTION, SOLUTION INTRAVENOUS; SUBCUTANEOUS at 23:00

## 2018-06-30 RX ADMIN — VANCOMYCIN HYDROCHLORIDE SCH MG: 250 CAPSULE ORAL at 06:02

## 2018-06-30 RX ADMIN — INSULIN ASPART SCH UNITS: 100 INJECTION, SOLUTION INTRAVENOUS; SUBCUTANEOUS at 06:06

## 2018-06-30 RX ADMIN — VANCOMYCIN HYDROCHLORIDE SCH MG: 250 CAPSULE ORAL at 00:12

## 2018-06-30 RX ADMIN — ZOLPIDEM TARTRATE PRN MG: 5 TABLET, COATED ORAL at 22:53

## 2018-06-30 RX ADMIN — ERTAPENEM SODIUM SCH MLS/HR: 1 INJECTION, POWDER, LYOPHILIZED, FOR SOLUTION INTRAMUSCULAR; INTRAVENOUS at 14:59

## 2018-06-30 RX ADMIN — DONEPEZIL HYDROCHLORIDE SCH MG: 10 TABLET, FILM COATED ORAL at 22:53

## 2018-06-30 RX ADMIN — ASPIRIN 325 MG ORAL TABLET SCH MG: 325 PILL ORAL at 09:17

## 2018-06-30 RX ADMIN — VANCOMYCIN HYDROCHLORIDE SCH MG: 250 CAPSULE ORAL at 11:16

## 2018-06-30 RX ADMIN — HEPARIN SODIUM SCH UNIT: 5000 INJECTION, SOLUTION INTRAVENOUS; SUBCUTANEOUS at 14:23

## 2018-06-30 RX ADMIN — LEVETIRACETAM SCH MG: 500 TABLET, FILM COATED ORAL at 22:53

## 2018-06-30 RX ADMIN — SODIUM CHLORIDE SCH: 9 INJECTION, SOLUTION INTRAVENOUS at 05:50

## 2018-06-30 RX ADMIN — SODIUM CHLORIDE SCH MLS/HR: 9 INJECTION, SOLUTION INTRAVENOUS at 11:30

## 2018-06-30 RX ADMIN — ATORVASTATIN CALCIUM SCH MG: 10 TABLET, FILM COATED ORAL at 22:53

## 2018-06-30 RX ADMIN — LEVETIRACETAM SCH MG: 500 TABLET, FILM COATED ORAL at 09:17

## 2018-06-30 RX ADMIN — ACETAMINOPHEN PRN MG: 325 TABLET ORAL at 12:31

## 2018-06-30 RX ADMIN — HEPARIN SODIUM SCH UNIT: 5000 INJECTION, SOLUTION INTRAVENOUS; SUBCUTANEOUS at 06:02

## 2018-06-30 RX ADMIN — PANTOPRAZOLE SODIUM SCH MG: 40 TABLET, DELAYED RELEASE ORAL at 09:17

## 2018-06-30 RX ADMIN — VANCOMYCIN HYDROCHLORIDE SCH MG: 250 CAPSULE ORAL at 17:41

## 2018-06-30 RX ADMIN — HEPARIN SODIUM SCH UNIT: 5000 INJECTION, SOLUTION INTRAVENOUS; SUBCUTANEOUS at 22:53

## 2018-06-30 RX ADMIN — AMLODIPINE BESYLATE SCH MG: 5 TABLET ORAL at 18:13

## 2018-06-30 RX ADMIN — METOPROLOL TARTRATE SCH MG: 50 TABLET, FILM COATED ORAL at 09:17

## 2018-06-30 RX ADMIN — INSULIN ASPART SCH UNITS: 100 INJECTION, SOLUTION INTRAVENOUS; SUBCUTANEOUS at 17:41

## 2018-06-30 NOTE — PN
Physical Exam: 


SUBJECTIVE: Patient seen and examined at bedside. Pt not communicative. No 

acute events overnight.








OBJECTIVE:





  Vital Signs











Temperature  98.0 F   06/30/18 17:49


 


Pulse Rate  69   06/30/18 17:50


 


Respiratory Rate  18   06/30/18 17:50


 


Blood Pressure  184/86   06/30/18 17:50


 


O2 Sat by Pulse Oximetry (%)  97   06/30/18 09:00














GENERAL: Mild discomfort, awake, nonverbal, noncooperating with commands


HEENT: NC/AT, GERTRUDE, dry-moist mucosa


NECK: Soft, no lymphadenopathy, no jvd


LUNGS: Poor inspiratory effort, no overt wheezes, no crackles, no accessory 

muscle use. 


HEART: RRR, S1, S2 without murmur, rub or gallop.


ABDOMEN: Soft, nondistended, hyperactive BS, no facial grimmacing with palpation

, no guarding.


EXTREMITIES: 2+ pulses, warm, well-perfused, no edema. 


SKIN: Warm, dry, no rashes or lesions noted














 Laboratory Results - last 24 hr











  06/30/18 06/30/18 06/30/18





  06:03 07:00 07:00


 


WBC   6.5 


 


RBC   3.96 


 


Hgb   11.0 


 


Hct   32.1 L 


 


MCV   80.9 


 


MCH   27.9 


 


MCHC   34.4 


 


RDW   15.0 


 


Plt Count   155 


 


MPV   8.2 


 


Sodium    137


 


Potassium    4.0


 


Chloride    105


 


Carbon Dioxide    25


 


Anion Gap    7 L


 


BUN    11


 


Creatinine    0.7


 


Creat Clearance w eGFR    > 60


 


POC Glucometer  274  


 


Random Glucose    237 H


 


Calcium    7.8 L


 


Phosphorus    3.0


 


Magnesium    1.8


 


Total Bilirubin    0.4


 


AST    11 L


 


ALT    20


 


Alkaline Phosphatase    68  D


 


Total Protein    6.1 L


 


Albumin    2.8 L














  06/30/18 06/30/18





  11:15 17:40


 


WBC  


 


RBC  


 


Hgb  


 


Hct  


 


MCV  


 


MCH  


 


MCHC  


 


RDW  


 


Plt Count  


 


MPV  


 


Sodium  


 


Potassium  


 


Chloride  


 


Carbon Dioxide  


 


Anion Gap  


 


BUN  


 


Creatinine  


 


Creat Clearance w eGFR  


 


POC Glucometer  263  230


 


Random Glucose  


 


Calcium  


 


Phosphorus  


 


Magnesium  


 


Total Bilirubin  


 


AST  


 


ALT  


 


Alkaline Phosphatase  


 


Total Protein  


 


Albumin  








Active Medications





Acetaminophen (Tylenol -)  650 mg PO Q4H PRN


   PRN Reason: FEVER


   Last Admin: 06/30/18 12:31 Dose:  650 mg


Amlodipine Besylate (Norvasc -)  5 mg PO DAILY [facility]


   Last Admin: 06/30/18 18:13 Dose:  5 mg


Aspirin (Asa -)  325 mg PO Q48H [facility]


   Last Admin: 06/30/18 09:17 Dose:  325 mg


Atorvastatin Calcium (Lipitor -)  10 mg PO HS [facility]


   Last Admin: 06/29/18 21:37 Dose:  10 mg


Donepezil HCl (Aricept -)  10 mg PO HS [facility]


   Last Admin: 06/29/18 21:37 Dose:  10 mg


Heparin Sodium (Porcine) (Heparin -)  5,000 unit SQ TID [facility]


   Last Admin: 06/30/18 14:23 Dose:  5,000 unit


Sodium Chloride (Normal Saline -)  1,000 mls @ 75 mls/hr IV ASDIR [facility]


   Last Admin: 06/30/18 11:30 Dose:  75 mls/hr


Ertapenem 1 gm/ Sodium (Chloride)  50 mls @ 100 mls/hr IVPB DAILY [facility]


   Last Admin: 06/30/18 14:59 Dose:  100 mls/hr


Insulin Aspart (Novolog Vial Sliding Scale -)  1 vial SQ ACHS [facility]; Protocol


   Last Admin: 06/30/18 17:41 Dose:  2 units


Insulin Detemir (Levemir Vial)  25 units SQ AM [facility]


Lactobacillus Acidophilus (Bacid -)  1 tab PO DAILY [facility]


Levetiracetam (Keppra -)  500 mg PO BID [facility]


   Last Admin: 06/30/18 09:17 Dose:  500 mg


Metoprolol Tartrate (Lopressor -)  50 mg PO DAILY [facility]


   Last Admin: 06/30/18 09:17 Dose:  50 mg


Pantoprazole Sodium (Protonix -)  40 mg PO DAILY [facility]


   Last Admin: 06/30/18 09:17 Dose:  40 mg


Trazodone HCl (Desyrel -)  50 mg PO HS PRN


   PRN Reason: INSOMNIA


Vancomycin HCl (Vancomycin Oral Solution)  125 mg PO Q6HPO [facility]


   Last Admin: 06/30/18 17:41 Dose:  125 mg


Zolpidem Tartrate (Ambien -)  5 mg PO HS PRN


   PRN Reason: INSOMNIA


   Last Admin: 06/29/18 21:44 Dose:  5 mg











ASSESSMENT/PLAN:


67 y/o F w/PMH of dementia, non-verbal, treated NHL, DM, HTN admitted for sepsis





-Sepsis secondary to likely UTI


   -improving clinically


   -UCx showing lactose fermenting gram neg bacilli


   -will restart ertapenem (pen allergy is hives); has hx of ESBL klebsiella


   -f/u BCx, UCx. BCx negative so far x 48 hrs





-DM


   -levemir increased to 25 units qhs


   -ISS, BGMs ACHS





-Recurrent C. Diff


   -oral vanco 125 mg po q6h





-Hx of seizures


   -c/w keppra





-HTN


   -Norvasc 5 mg resumed


   -c/w lopressor 50 mg po qd





-DVT ppx


   -heparin sq 5000 units q8h





-FEN


   -NS @ 75 ml/hr


   -monitor electrolytes


   -Sodium controlled/diabetic diet





-Dispo: continue M/S





Visit type





- Emergency Visit


Emergency Visit: Yes


ED Registration Date: 06/29/18


Care time: The patient presented to the Emergency Department on the above date 

and was hospitalized for further evaluation of their emergent condition.





- New Patient


This patient is new to me today: Yes


Date on this admission: 06/30/18





- Critical Care


Critical Care patient: No

## 2018-06-30 NOTE — PN
Teaching Attending Note


Name of Resident: Trav Knapp





ATTENDING PHYSICIAN STATEMENT





I saw and evaluated the patient.


I reviewed the resident's note and discussed the case with the resident.


I agree with the resident's findings and plan as documented.








SUBJECTIVE:


No events . unable to obtain hx 





OBJECTIVE:


NAD , non verbal


slightly dry MM. no JVD 


CV: RRR, no MRG


 lungs: CTAB 


Ext: no edema 


Abd: soft, NT nl BS . 





ASSESSMENT AND PLAN:





69 y/o lady with h/o  dementia , non verbal, treated Non Hodgkin's lymphhoma , 

DM , HTN, recent hospitalization for ESBL klebsiella UTI, recent seizures who 

presented with fever . 





1- Sepsis  due to UTI: was on po abx as out pt ,, minimal pyuria but U cx grew 

lactose fermenting GNR 


- resume ertapenem given her h/o ESBL klebsiella 


- cont IVF 


- follow blood cx and final urine cx 


- no mechanical obstruction , and no intervention 





2- DM : cont SSI and increase levemir to 25 units q  HS 





3- H/o recent seizures. Cont keppra . 





4- HTN:  cont BB , can resume Norvasc if needed 





5- DVT PX: heparin sq 





d/w Son at bed side

## 2018-07-01 LAB
ALBUMIN SERPL-MCNC: 2.8 G/DL (ref 3.4–5)
ALP SERPL-CCNC: 67 U/L (ref 45–117)
ALT SERPL-CCNC: 24 U/L (ref 12–78)
ANION GAP SERPL CALC-SCNC: 10 MMOL/L (ref 8–16)
AST SERPL-CCNC: 16 U/L (ref 15–37)
BILIRUB SERPL-MCNC: 0.3 MG/DL (ref 0.2–1)
BUN SERPL-MCNC: 11 MG/DL (ref 7–18)
CALCIUM SERPL-MCNC: 7.9 MG/DL (ref 8.5–10.1)
CHLORIDE SERPL-SCNC: 108 MMOL/L (ref 98–107)
CO2 SERPL-SCNC: 24 MMOL/L (ref 21–32)
CREAT SERPL-MCNC: 0.6 MG/DL (ref 0.55–1.02)
DEPRECATED RDW RBC AUTO: 14.6 % (ref 11.6–15.6)
GLUCOSE SERPL-MCNC: 176 MG/DL (ref 74–106)
HCT VFR BLD CALC: 35.5 % (ref 32.4–45.2)
HGB BLD-MCNC: 12.1 GM/DL (ref 10.7–15.3)
MCH RBC QN AUTO: 27.7 PG (ref 25.7–33.7)
MCHC RBC AUTO-ENTMCNC: 34 G/DL (ref 32–36)
MCV RBC: 81.4 FL (ref 80–96)
PLATELET # BLD AUTO: 160 K/MM3 (ref 134–434)
PMV BLD: 8.5 FL (ref 7.5–11.1)
POTASSIUM SERPLBLD-SCNC: 3.8 MMOL/L (ref 3.5–5.1)
PROT SERPL-MCNC: 6.3 G/DL (ref 6.4–8.2)
RBC # BLD AUTO: 4.36 M/MM3 (ref 3.6–5.2)
SODIUM SERPL-SCNC: 142 MMOL/L (ref 136–145)
WBC # BLD AUTO: 5.3 K/MM3 (ref 4–10)

## 2018-07-01 RX ADMIN — HEPARIN SODIUM SCH UNIT: 5000 INJECTION, SOLUTION INTRAVENOUS; SUBCUTANEOUS at 14:19

## 2018-07-01 RX ADMIN — VANCOMYCIN HYDROCHLORIDE SCH MG: 250 CAPSULE ORAL at 11:40

## 2018-07-01 RX ADMIN — INSULIN ASPART SCH: 100 INJECTION, SOLUTION INTRAVENOUS; SUBCUTANEOUS at 07:05

## 2018-07-01 RX ADMIN — HEPARIN SODIUM SCH UNIT: 5000 INJECTION, SOLUTION INTRAVENOUS; SUBCUTANEOUS at 05:24

## 2018-07-01 RX ADMIN — INSULIN ASPART SCH UNITS: 100 INJECTION, SOLUTION INTRAVENOUS; SUBCUTANEOUS at 17:24

## 2018-07-01 RX ADMIN — VANCOMYCIN HYDROCHLORIDE SCH MG: 250 CAPSULE ORAL at 17:23

## 2018-07-01 RX ADMIN — INSULIN ASPART SCH UNITS: 100 INJECTION, SOLUTION INTRAVENOUS; SUBCUTANEOUS at 11:40

## 2018-07-01 RX ADMIN — HEPARIN SODIUM SCH UNIT: 5000 INJECTION, SOLUTION INTRAVENOUS; SUBCUTANEOUS at 21:39

## 2018-07-01 RX ADMIN — AMLODIPINE BESYLATE SCH MG: 5 TABLET ORAL at 09:54

## 2018-07-01 RX ADMIN — INSULIN ASPART SCH UNITS: 100 INJECTION, SOLUTION INTRAVENOUS; SUBCUTANEOUS at 21:37

## 2018-07-01 RX ADMIN — VANCOMYCIN HYDROCHLORIDE SCH MG: 250 CAPSULE ORAL at 01:38

## 2018-07-01 RX ADMIN — METOPROLOL TARTRATE SCH MG: 50 TABLET, FILM COATED ORAL at 09:54

## 2018-07-01 RX ADMIN — PANTOPRAZOLE SODIUM SCH MG: 40 TABLET, DELAYED RELEASE ORAL at 09:53

## 2018-07-01 RX ADMIN — ERTAPENEM SODIUM SCH MLS/HR: 1 INJECTION, POWDER, LYOPHILIZED, FOR SOLUTION INTRAMUSCULAR; INTRAVENOUS at 10:28

## 2018-07-01 RX ADMIN — SODIUM CHLORIDE SCH: 9 INJECTION, SOLUTION INTRAVENOUS at 05:22

## 2018-07-01 RX ADMIN — Medication SCH TAB: at 09:54

## 2018-07-01 RX ADMIN — LEVETIRACETAM SCH MG: 500 TABLET, FILM COATED ORAL at 21:39

## 2018-07-01 RX ADMIN — DONEPEZIL HYDROCHLORIDE SCH MG: 10 TABLET, FILM COATED ORAL at 21:40

## 2018-07-01 RX ADMIN — SODIUM CHLORIDE SCH MLS/HR: 9 INJECTION, SOLUTION INTRAVENOUS at 01:38

## 2018-07-01 RX ADMIN — VANCOMYCIN HYDROCHLORIDE SCH MG: 250 CAPSULE ORAL at 05:24

## 2018-07-01 RX ADMIN — SODIUM CHLORIDE SCH MLS/HR: 9 INJECTION, SOLUTION INTRAVENOUS at 16:13

## 2018-07-01 RX ADMIN — LEVETIRACETAM SCH MG: 500 TABLET, FILM COATED ORAL at 09:54

## 2018-07-01 RX ADMIN — ATORVASTATIN CALCIUM SCH MG: 10 TABLET, FILM COATED ORAL at 21:39

## 2018-07-01 RX ADMIN — INSULIN DETEMIR SCH UNIT: 100 INJECTION, SOLUTION SUBCUTANEOUS at 07:07

## 2018-07-01 RX ADMIN — TRAZODONE HYDROCHLORIDE PRN MG: 50 TABLET ORAL at 21:39

## 2018-07-01 NOTE — PN
Progress Note, Physician


History of Present Illness: 





Patient is a 68 yr old female admitted with fever and chills last night. She 

has long Hx of recurrent UTI's in the past.


Hx Strep Bacteremia/UTI late lsat year, s/p stent placement and eventual 

removal. Hospital a few months ago for MDR Klebsiella UTI.


Has had prolonged courses of Abx, thus, subsequent CDiff Colitis - currently on 

PO Vanco. Last night, noted to be more lethargic and had fever and chills. She 

was sent to the ED, where w/up done and admitted. Started on Invanz.





- Current Medication List


Current Medications: 


Active Medications





Acetaminophen (Tylenol -)  650 mg PO Q4H PRN


   PRN Reason: FEVER


   Last Admin: 06/30/18 12:31 Dose:  650 mg


Amlodipine Besylate (Norvasc -)  5 mg PO DAILY [facility]


   Last Admin: 07/01/18 09:54 Dose:  5 mg


Aspirin (Asa -)  325 mg PO Q48H [facility]


   Last Admin: 06/30/18 09:17 Dose:  325 mg


Atorvastatin Calcium (Lipitor -)  10 mg PO HS [facility]


   Last Admin: 06/30/18 22:53 Dose:  10 mg


Donepezil HCl (Aricept -)  10 mg PO HS [facility]


   Last Admin: 06/30/18 22:53 Dose:  10 mg


Heparin Sodium (Porcine) (Heparin -)  5,000 unit SQ TID [facility]


   Last Admin: 07/01/18 14:19 Dose:  5,000 unit


IV Flush (Picc Line Flush)  8 ml IVPUSH PRN PRN


   PRN Reason: Protocol


Ertapenem 1 gm/ Sodium (Chloride)  50 mls @ 100 mls/hr IVPB DAILY [facility]


   Last Admin: 07/01/18 10:28 Dose:  100 mls/hr


Insulin Aspart (Novolog Vial Sliding Scale -)  1 vial SQ ACHS [facility]; Protocol


   Last Admin: 07/01/18 17:24 Dose:  2 units


Insulin Detemir (Levemir Vial)  25 units SQ AM [facility]


   Last Admin: 07/01/18 07:07 Dose:  25 unit


Lactobacillus Acidophilus (Bacid -)  1 tab PO DAILY [facility]


   Last Admin: 07/01/18 09:54 Dose:  1 tab


Levetiracetam (Keppra -)  500 mg PO BID [facility]


   Last Admin: 07/01/18 09:54 Dose:  500 mg


Metoprolol Tartrate (Lopressor -)  50 mg PO DAILY [facility]


   Last Admin: 07/01/18 09:54 Dose:  50 mg


Pantoprazole Sodium (Protonix -)  40 mg PO DAILY [facility]


   Last Admin: 07/01/18 09:53 Dose:  40 mg


Trazodone HCl (Desyrel -)  50 mg PO HS PRN


   PRN Reason: INSOMNIA


Vancomycin HCl (Vancomycin Oral Solution)  125 mg PO Q6HPO [facility]


   Last Admin: 07/01/18 17:23 Dose:  125 mg


Zolpidem Tartrate (Ambien -)  5 mg PO HS PRN


   PRN Reason: INSOMNIA


   Last Admin: 06/30/18 22:53 Dose:  5 mg











- Objective


Vital Signs: 


 Vital Signs











Temperature  97.5 F L  07/01/18 17:41


 


Pulse Rate  71   07/01/18 17:41


 


Respiratory Rate  18   07/01/18 17:41


 


Blood Pressure  129/84   07/01/18 17:41


 


O2 Sat by Pulse Oximetry (%)  96   07/01/18 09:00











Constitutional: Yes: Well Nourished


Eyes: Yes: PERRL


Neck: Yes: Supple


Cardiovascular: Yes: Regular Rate and Rhythm


Respiratory: Yes: CTA Bilaterally


Gastrointestinal: Yes: Normal Bowel Sounds, Soft.  No: Tenderness


Genitourinary: No: CVA Tenderness - Left, CVA Tenderness - Right


Labs: 


 CBC, BMP





 07/01/18 07:30 





 07/01/18 07:30 





 INR, PTT











INR  0.96  (0.82-1.09)   06/28/18  20:23    














Problem List





- Problems


(1) C. difficile diarrhea


Code(s): A04.72 - ENTEROCOLITIS D/T CLOSTRIDIUM DIFFICILE, NOT SPCF AS RECUR   





(2) UTI (urinary tract infection)


Code(s): N39.0 - URINARY TRACT INFECTION, SITE NOT SPECIFIED   


Qualifiers: 


   Urinary tract infection type: site unspecified   Hematuria presence: without 

hematuria   Qualified Code(s): N39.0 - Urinary tract infection, site not 

specified   





Assessment/Plan





Pt with fever, Hx CDI, UTI





Consider RX PO Levaquin 500 mg qd


Total 10-14 days


Check stool for CDI

## 2018-07-01 NOTE — PN
Addendum entered and electronically signed by Marlo Sánchez, RESIDENT  07/01 /18 18:45: 


Urine Grew ESBL Klebsiella.





Original Note:





Physical Exam: 


SUBJECTIVE: Patient seen and examined








OBJECTIVE:





 Vital Signs











 Period  Temp  Pulse  Resp  BP Sys/Silver  Pulse Ox


 


 Last 24 Hr  96.7 F-98.1 F  69-84  16-18  129-184/80-94  96-97











GENERAL: The patient is awake, alert, and fully oriented, in no acute distress.


HEAD: Normal with no signs of trauma.


EYES: PERRL, extraocular movements intact, sclera anicteric, conjunctiva clear. 

No ptosis. 


ENT: Ears normal, nares patent, oropharynx clear without exudates, moist mucous 


membranes.


NECK: Trachea midline, full range of motion, supple. 


LUNGS: Breath sounds equal, clear to auscultation bilaterally, no wheezes, no 

crackles, no 


accessory muscle use. 


HEART: Regular rate and rhythm, S1, S2 without murmur, rub or gallop.


ABDOMEN: Soft, nontender, nondistended, normoactive bowel sounds, no guarding, 

no 


rebound, no hepatosplenomegaly, no masses.


EXTREMITIES: 2+ pulses, warm, well-perfused, no edema. 


NEUROLOGICAL: Cranial nerves II through XII grossly intact. Normal speech, gait 

not 


observed.


PSYCH: Normal mood, normal affect.


SKIN: Warm, dry, normal turgor, no rashes or lesions noted














 Laboratory Results - last 24 hr











  06/30/18 07/01/18 07/01/18





  22:57 05:21 07:30


 


WBC    5.3


 


RBC    4.36


 


Hgb    12.1


 


Hct    35.5


 


MCV    81.4


 


MCH    27.7


 


MCHC    34.0


 


RDW    14.6


 


Plt Count    160


 


MPV    8.5


 


Sodium   


 


Potassium   


 


Chloride   


 


Carbon Dioxide   


 


Anion Gap   


 


BUN   


 


Creatinine   


 


Creat Clearance w eGFR   


 


POC Glucometer  254  178 


 


Random Glucose   


 


Calcium   


 


Total Bilirubin   


 


AST   


 


ALT   


 


Alkaline Phosphatase   


 


Total Protein   


 


Albumin   














  07/01/18 07/01/18 07/01/18





  07:30 11:39 17:21


 


WBC   


 


RBC   


 


Hgb   


 


Hct   


 


MCV   


 


MCH   


 


MCHC   


 


RDW   


 


Plt Count   


 


MPV   


 


Sodium  142  


 


Potassium  3.8  


 


Chloride  108 H  


 


Carbon Dioxide  24  


 


Anion Gap  10  


 


BUN  11  


 


Creatinine  0.6  


 


Creat Clearance w eGFR  > 60  


 


POC Glucometer   261  248


 


Random Glucose  176 H  


 


Calcium  7.9 L  


 


Total Bilirubin  0.3  


 


AST  16  


 


ALT  24  


 


Alkaline Phosphatase  67  


 


Total Protein  6.3 L  


 


Albumin  2.8 L  








Active Medications











Generic Name Dose Route Start Last Admin





  Trade Name Freq  PRN Reason Stop Dose Admin


 


Acetaminophen  650 mg  06/29/18 03:21  06/30/18 12:31





  Tylenol -  PO   650 mg





  Q4H PRN   Administration





  FEVER   





     





     





     


 


Amlodipine Besylate  5 mg  06/30/18 18:15  07/01/18 09:54





  Norvasc -  PO   5 mg





  DAILY [facility]   Administration





     





     





     





     


 


Aspirin  325 mg  06/30/18 10:00  06/30/18 09:17





  Asa -  PO   325 mg





  Q48H [facility]   Administration





     





     





     





     


 


Atorvastatin Calcium  10 mg  06/29/18 22:00  06/30/18 22:53





  Lipitor -  PO   10 mg





  HS [facility]   Administration





     





     





     





     


 


Donepezil HCl  10 mg  06/29/18 22:00  06/30/18 22:53





  Aricept -  PO   10 mg





  HS [facility]   Administration





     





     





     





     


 


Heparin Sodium (Porcine)  5,000 unit  06/29/18 14:00  07/01/18 14:19





  Heparin -  SQ   5,000 unit





  TID [facility]   Administration





     





     





     





     


 


IV Flush  8 ml  07/01/18 16:26  





  Picc Line Flush  IVPUSH   





  PRN PRN   





  Protocol   





     





     





     


 


Ertapenem 1 gm/ Sodium  50 mls @ 100 mls/hr  06/30/18 13:15  07/01/18 10:28





  Chloride  IVPB   100 mls/hr





  DAILY [facility]   Administration





     





     





     





     


 


Insulin Aspart  1 vial  06/29/18 22:00  07/01/18 17:24





  Novolog Vial Sliding Scale -  SQ   2 units





  [facility]   Administration





     





     





  Protocol   





     


 


Insulin Detemir  25 units  06/30/18 11:00  07/01/18 07:07





  Levemir Vial  SQ   25 unit





  AM [facility]   Administration





     





     





     





     


 


Lactobacillus Acidophilus  1 tab  07/01/18 10:00  07/01/18 09:54





  Bacid -  PO   1 tab





  DAILY [facility]   Administration





     





     





     





     


 


Levetiracetam  500 mg  06/29/18 22:00  07/01/18 09:54





  Keppra -  PO   500 mg





  BID [facility]   Administration





     





     





     





     


 


Metoprolol Tartrate  50 mg  06/29/18 10:00  07/01/18 09:54





  Lopressor -  PO   50 mg





  DAILY [facility]   Administration





     





     





     





     


 


Pantoprazole Sodium  40 mg  06/29/18 10:00  07/01/18 09:53





  Protonix -  PO   40 mg





  DAILY [facility]   Administration





     





     





     





     


 


Trazodone HCl  50 mg  06/29/18 03:25  





  Desyrel -  PO   





  HS PRN   





  INSOMNIA   





     





     





     


 


Vancomycin HCl  125 mg  06/29/18 00:00  07/01/18 17:23





  Vancomycin Oral Solution  PO   125 mg





  Q6HPO [facility]   Administration





     





     





     





     


 


Zolpidem Tartrate  5 mg  06/29/18 16:07  06/30/18 22:53





  Ambien -  PO   5 mg





  HS PRN   Administration





  INSOMNIA   





     





     





     











ASSESSMENT/PLAN:





69 y/o F w/PMH of dementia, non-verbal, treated NHL, DM, HTN admitted for 

sepsis 2/2 UTI





#Sepsis 2/2 UTI: improving clinically


-UCx showing lactose fermenting gram neg bacilli


-on ertapenem day 2


-will likely D/C tomorrow on PO levaquin 500mg for 14 days


-will check stool for c diff





#Diabetes: stable


-continue levemir 25 AM


-ISS, BGMs ACHS





#Recurrent C. Diff: no BM today


-continue oral vanco 125 mg po q6h


-will check cdiff toxin/antigen





#Seizure Disorder: stable


-keppra 500 PO BID





#Hypertension: mildly hypertensive toady at 170/80


-continue norvasc 5mg


-will add another norvasc 5mg


-continue lopressor 50 mg po qd





#Prophylaxis


-heparin sq 5000 units q8h





#FEN


-no standing fluids


-monitor electrolytes


-Sodium controlled/diabetic diet





#Dispo: 


-continue to monitor on med surg





Visit type





- Emergency Visit


Emergency Visit: No





- New Patient


This patient is new to me today: Yes


Date on this admission: 07/01/18





- Critical Care


Critical Care patient: No

## 2018-07-01 NOTE — PN
Teaching Attending Note


Name of Resident: Marlo Sánchez





ATTENDING PHYSICIAN STATEMENT





I saw and evaluated the patient.


I reviewed the resident's note and discussed the case with the resident.


I agree with the resident's findings and plan as documented.








SUBJECTIVE:


No events over night ,  no diarrhea 








OBJECTIVE:


NAD , non verbal


MMM


CV: RRR, no MRG


lungs: CTAB 


Ext: no edema 





ASSESSMENT AND PLAN:





69 y/o lady with h/o  dementia , non verbal, treated Non Hodgkin's lymphhoma , 

DM , HTN, recent hospitalization for ESBL klebsiella UTI, recent seizures who 

presented with fever . 





1- Sepsis due to UTI: cx with Klebsiella ESBL. 


- Cont ertapenam day 2 


- will d/w Id about duration and PICC line placement. 


- Dc IVF 


- no mechanical obstruction , and no intervention 





2- DM : cont SSI and Levemir to 25 units q  HS 





3- H/o recent seizures. Cont keppra . 





4- HTN:  cont BB and norvasc  . ca adjust dose if needed 





5- DVT PX: heparin sq 





possible dc tomorrow. d/w son at bed side , interested in home with IV abx

## 2018-07-02 RX ADMIN — AMLODIPINE BESYLATE SCH MG: 10 TABLET ORAL at 10:37

## 2018-07-02 RX ADMIN — HEPARIN SODIUM SCH UNIT: 5000 INJECTION, SOLUTION INTRAVENOUS; SUBCUTANEOUS at 21:37

## 2018-07-02 RX ADMIN — INSULIN ASPART SCH UNITS: 100 INJECTION, SOLUTION INTRAVENOUS; SUBCUTANEOUS at 17:33

## 2018-07-02 RX ADMIN — VANCOMYCIN HYDROCHLORIDE SCH MG: 250 CAPSULE ORAL at 06:42

## 2018-07-02 RX ADMIN — VANCOMYCIN HYDROCHLORIDE SCH MG: 250 CAPSULE ORAL at 17:32

## 2018-07-02 RX ADMIN — METOPROLOL TARTRATE SCH MG: 50 TABLET, FILM COATED ORAL at 10:37

## 2018-07-02 RX ADMIN — PANTOPRAZOLE SODIUM SCH MG: 40 TABLET, DELAYED RELEASE ORAL at 10:37

## 2018-07-02 RX ADMIN — VANCOMYCIN HYDROCHLORIDE SCH MG: 250 CAPSULE ORAL at 11:36

## 2018-07-02 RX ADMIN — Medication SCH TAB: at 10:37

## 2018-07-02 RX ADMIN — LEVETIRACETAM SCH MG: 500 TABLET, FILM COATED ORAL at 10:37

## 2018-07-02 RX ADMIN — HEPARIN SODIUM SCH UNIT: 5000 INJECTION, SOLUTION INTRAVENOUS; SUBCUTANEOUS at 06:43

## 2018-07-02 RX ADMIN — DONEPEZIL HYDROCHLORIDE SCH MG: 10 TABLET, FILM COATED ORAL at 21:36

## 2018-07-02 RX ADMIN — ERTAPENEM SODIUM SCH MLS/HR: 1 INJECTION, POWDER, LYOPHILIZED, FOR SOLUTION INTRAMUSCULAR; INTRAVENOUS at 10:36

## 2018-07-02 RX ADMIN — LEVETIRACETAM SCH MG: 500 TABLET, FILM COATED ORAL at 21:36

## 2018-07-02 RX ADMIN — INSULIN DETEMIR SCH UNIT: 100 INJECTION, SOLUTION SUBCUTANEOUS at 06:43

## 2018-07-02 RX ADMIN — HEPARIN SODIUM SCH UNIT: 5000 INJECTION, SOLUTION INTRAVENOUS; SUBCUTANEOUS at 14:46

## 2018-07-02 RX ADMIN — INSULIN ASPART SCH: 100 INJECTION, SOLUTION INTRAVENOUS; SUBCUTANEOUS at 06:47

## 2018-07-02 RX ADMIN — ASPIRIN 325 MG ORAL TABLET SCH MG: 325 PILL ORAL at 10:37

## 2018-07-02 RX ADMIN — VANCOMYCIN HYDROCHLORIDE SCH MG: 250 CAPSULE ORAL at 00:50

## 2018-07-02 RX ADMIN — INSULIN ASPART SCH UNITS: 100 INJECTION, SOLUTION INTRAVENOUS; SUBCUTANEOUS at 11:36

## 2018-07-02 RX ADMIN — ATORVASTATIN CALCIUM SCH MG: 10 TABLET, FILM COATED ORAL at 21:36

## 2018-07-02 RX ADMIN — INSULIN ASPART SCH: 100 INJECTION, SOLUTION INTRAVENOUS; SUBCUTANEOUS at 21:37

## 2018-07-02 NOTE — PN
Progress Note (short form)





- Note


Progress Note: 





renal scan with no evidence of obstruction


no surgical intervention needed

## 2018-07-02 NOTE — PN
Physical Exam: 


SUBJECTIVE: 


Patient is a 69 y/o female with c. diff, klebsiella UTI, dementia, DM, CAD, HLD

, HTN and NHL s/p chemo who is admitted for sepsis. Patient did not have any 

fever over night. Patient had no acute events overnight, patients son stayed 

the night and said it there were no events.  








OBJECTIVE:





 Vital Signs











 Period  Temp  Pulse  Resp  BP Sys/Silver  Pulse Ox


 


 Last 24 Hr  96 F-98.6 F  65-83  16-20  129-162/74-97  96











GENERAL: The patient is awake, alert, patient is non verbal at baseline


HEAD: Normal with no signs of trauma.


LUNGS: Breath sounds equal, clear to auscultation bilaterally 


HEART: Regular rate and rhythm


ABDOMEN: Soft, nontender, nondistended














 Laboratory Results - last 24 hr











  06/29/18 07/01/18 07/01/18





  04:01 17:21 21:35


 


POC Glucometer   248  370


 


Levetiracetam  4.9 L  














  07/02/18 07/02/18





  06:41 11:34


 


POC Glucometer  150  369


 


Levetiracetam  








Active Medications











Generic Name Dose Route Start Last Admin





  Trade Name Freq  PRN Reason Stop Dose Admin


 


Acetaminophen  650 mg  06/29/18 03:21  06/30/18 12:31





  Tylenol -  PO   650 mg





  Q4H PRN   Administration





  FEVER   





     





     





     


 


Amlodipine Besylate  10 mg  07/02/18 10:00  07/02/18 10:37





  Norvasc -  PO   10 mg





  DAILY [facility]   Administration





     





     





     





     


 


Aspirin  325 mg  06/30/18 10:00  07/02/18 10:37





  Asa -  PO   325 mg





  Q48H [facility]   Administration





     





     





     





     


 


Atorvastatin Calcium  10 mg  06/29/18 22:00  07/01/18 21:39





  Lipitor -  PO   10 mg





  HS FESTUS   Administration





     





     





     





     


 


Donepezil HCl  10 mg  06/29/18 22:00  07/01/18 21:40





  Aricept -  PO   10 mg





  HS FESTUS   Administration





     





     





     





     


 


Heparin Sodium (Porcine)  5,000 unit  06/29/18 14:00  07/02/18 14:46





  Heparin -  SQ   5,000 unit





  TID [facility]   Administration





     





     





     





     


 


IV Flush  8 ml  07/01/18 16:26  





  Picc Line Flush  IVPUSH   





  PRN PRN   





  Protocol   





     





     





     


 


Ertapenem 1 gm/ Sodium  50 mls @ 100 mls/hr  06/30/18 13:15  07/02/18 10:36





  Chloride  IVPB   100 mls/hr





  DAILY [facility]   Administration





     





     





     





     


 


Insulin Aspart  1 vial  06/29/18 22:00  07/02/18 11:36





  Novolog Vial Sliding Scale -  SQ   8 units





  [facility]   Administration





     





     





  Protocol   





     


 


Insulin Detemir  25 units  06/30/18 11:00  07/02/18 06:43





  Levemir Vial  SQ   25 unit





  AM [facility]   Administration





     





     





     





     


 


Lactobacillus Acidophilus  1 tab  07/01/18 10:00  07/02/18 10:37





  Bacid -  PO   1 tab





  DAILY [facility]   Administration





     





     





     





     


 


Levetiracetam  500 mg  06/29/18 22:00  07/02/18 10:37





  Keppra -  PO   500 mg





  BID [facility]   Administration





     





     





     





     


 


Metoprolol Tartrate  50 mg  06/29/18 10:00  07/02/18 10:37





  Lopressor -  PO   50 mg





  DAILY [facility]   Administration





     





     





     





     


 


Pantoprazole Sodium  40 mg  06/29/18 10:00  07/02/18 10:37





  Protonix -  PO   40 mg





  DAILY [facility]   Administration





     





     





     





     


 


Trazodone HCl  50 mg  06/29/18 03:25  07/01/18 21:39





  Desyrel -  PO   50 mg





  HS PRN   Administration





  INSOMNIA   





     





     





     


 


Vancomycin HCl  125 mg  06/29/18 00:00  07/02/18 11:36





  Vancomycin Oral Solution  PO   125 mg





  Q6HPO [facility]   Administration





     





     





     





     


 


Zolpidem Tartrate  5 mg  06/29/18 16:07  06/30/18 22:53





  Ambien -  PO   5 mg





  HS PRN   Administration





  INSOMNIA   





     





     





     











ASSESSMENT/PLAN:


#Sepsis


   -Ucx found to have lactose fermenting gram negative bacilli


   -Ertapenem day 3 ( of 7 days)


   -patient afebrile





#C.diff


   -Continue PO Vanco for C. diff treatment





#DM


   -Continue levemir and SSI





#Seizure


   -Continue home medications





#DVT PPX:


   -Continue Heparin








Dispo: PICC line for outpaitnet Ertapenem ( 7 days in total), & more days of PO 

Vanco for C. diff





Visit type





- Emergency Visit


Emergency Visit: No





- New Patient


This patient is new to me today: Yes


Date on this admission: 07/02/18





- Critical Care


Critical Care patient: No

## 2018-07-03 RX ADMIN — VANCOMYCIN HYDROCHLORIDE SCH MG: 250 CAPSULE ORAL at 01:13

## 2018-07-03 RX ADMIN — DONEPEZIL HYDROCHLORIDE SCH MG: 10 TABLET, FILM COATED ORAL at 23:01

## 2018-07-03 RX ADMIN — ATORVASTATIN CALCIUM SCH MG: 10 TABLET, FILM COATED ORAL at 23:01

## 2018-07-03 RX ADMIN — VANCOMYCIN HYDROCHLORIDE SCH MG: 250 CAPSULE ORAL at 05:48

## 2018-07-03 RX ADMIN — HEPARIN SODIUM SCH UNIT: 5000 INJECTION, SOLUTION INTRAVENOUS; SUBCUTANEOUS at 22:59

## 2018-07-03 RX ADMIN — TRAZODONE HYDROCHLORIDE PRN MG: 50 TABLET ORAL at 23:01

## 2018-07-03 RX ADMIN — LEVETIRACETAM SCH MG: 500 TABLET, FILM COATED ORAL at 23:01

## 2018-07-03 RX ADMIN — ERTAPENEM SODIUM SCH MLS/HR: 1 INJECTION, POWDER, LYOPHILIZED, FOR SOLUTION INTRAMUSCULAR; INTRAVENOUS at 11:03

## 2018-07-03 RX ADMIN — LEVETIRACETAM SCH MG: 500 TABLET, FILM COATED ORAL at 11:03

## 2018-07-03 RX ADMIN — VANCOMYCIN HYDROCHLORIDE SCH MG: 250 CAPSULE ORAL at 11:11

## 2018-07-03 RX ADMIN — NYSTATIN PRN APPLIC: 100000 POWDER TOPICAL at 23:08

## 2018-07-03 RX ADMIN — VANCOMYCIN HYDROCHLORIDE SCH MG: 250 CAPSULE ORAL at 18:08

## 2018-07-03 RX ADMIN — INSULIN ASPART SCH UNITS: 100 INJECTION, SOLUTION INTRAVENOUS; SUBCUTANEOUS at 11:17

## 2018-07-03 RX ADMIN — HEPARIN SODIUM SCH UNIT: 5000 INJECTION, SOLUTION INTRAVENOUS; SUBCUTANEOUS at 05:48

## 2018-07-03 RX ADMIN — INSULIN ASPART SCH: 100 INJECTION, SOLUTION INTRAVENOUS; SUBCUTANEOUS at 06:59

## 2018-07-03 RX ADMIN — ACETAMINOPHEN PRN MG: 325 TABLET ORAL at 20:04

## 2018-07-03 RX ADMIN — INSULIN ASPART SCH UNITS: 100 INJECTION, SOLUTION INTRAVENOUS; SUBCUTANEOUS at 18:08

## 2018-07-03 RX ADMIN — INSULIN DETEMIR SCH UNITS: 100 INJECTION, SOLUTION SUBCUTANEOUS at 07:00

## 2018-07-03 RX ADMIN — HEPARIN SODIUM SCH: 5000 INJECTION, SOLUTION INTRAVENOUS; SUBCUTANEOUS at 15:19

## 2018-07-03 RX ADMIN — AMLODIPINE BESYLATE SCH MG: 10 TABLET ORAL at 11:03

## 2018-07-03 RX ADMIN — VANCOMYCIN HYDROCHLORIDE SCH MG: 250 CAPSULE ORAL at 23:07

## 2018-07-03 RX ADMIN — Medication SCH TAB: at 11:02

## 2018-07-03 RX ADMIN — INSULIN ASPART SCH UNITS: 100 INJECTION, SOLUTION INTRAVENOUS; SUBCUTANEOUS at 23:00

## 2018-07-03 RX ADMIN — PANTOPRAZOLE SODIUM SCH MG: 40 TABLET, DELAYED RELEASE ORAL at 11:03

## 2018-07-03 NOTE — PN
Teaching Attending Note


Name of Resident: Precious Carpio





ATTENDING PHYSICIAN STATEMENT





I saw and evaluated the patient.


I reviewed the resident's note and discussed the case with the resident.


I agree with the resident's findings and plan as documented.








SUBJECTIVE:


No events overnight . loose stool 





OBJECTIVE:








NAD , non verbal


MMM


CV: RRR, no MRG


lungs: CTAB 


Ext: no edema 


Abd: sfot, NT, ND , NL BS 





ASSESSMENT AND PLAN:





67 y/o lady with h/o  dementia , non verbal, treated Non Hodgkin's lymphhoma , 

DM , HTN, recent hospitalization for ESBL klebsiella UTI, recent seizures who 

presented with fever . 





1- Sepsis due to UTI: cx with Klebsiella ESBL. 


- Cont ertapenam day 4/7  


- no mechanical obstruction , and no intervention 


- f/u with ID nad urology as out pt 





2- DM: cont SSI and  levemir 





3- H/o recent seizures. Cont keppra. 





4- HTN: cont BB and increased norvasc





Dc pending arrangements for Home Abx

## 2018-07-03 NOTE — PN
Progress Note, Physician


History of Present Illness: 





Patient is a 68 yr old female admitted with fever and chills last night. She 

has long Hx of recurrent UTI's in the past.


Hx Strep Bacteremia/UTI late lsat year, s/p stent placement and eventual 

removal. Hospital a few months ago for MDR Klebsiella UTI.


Has had prolonged courses of Abx, thus, subsequent CDiff Colitis - currently on 

PO Vanco. Last night, noted to be more lethargic and had fever and chills. She 

was sent to the ED, where w/up done and admitted. Started on Invanz.(day 6)





- Current Medication List


Current Medications: 


Active Medications





Acetaminophen (Tylenol -)  650 mg PO Q4H PRN


   PRN Reason: FEVER


   Last Admin: 06/30/18 12:31 Dose:  650 mg


Amlodipine Besylate (Norvasc -)  10 mg PO DAILY [facility]


   Last Admin: 07/03/18 11:03 Dose:  10 mg


Aspirin (Asa -)  325 mg PO Q48H [facility]


   Last Admin: 07/02/18 10:37 Dose:  325 mg


Atorvastatin Calcium (Lipitor -)  10 mg PO HS [facility]


   Last Admin: 07/02/18 21:36 Dose:  10 mg


Donepezil HCl (Aricept -)  10 mg PO HS [facility]


   Last Admin: 07/02/18 21:36 Dose:  10 mg


Heparin Sodium (Porcine) (Heparin -)  5,000 unit SQ TID [facility]


   Last Admin: 07/03/18 15:19 Dose:  Not Given


IV Flush (Picc Line Flush)  8 ml IVPUSH PRN PRN


   PRN Reason: Protocol


IV Flush (Picc Line Flush)  8 ml IVPUSH PRN PRN


   PRN Reason: Protocol


Ertapenem 1 gm/ Sodium (Chloride)  50 mls @ 100 mls/hr IVPB DAILY [facility]


   Last Admin: 07/03/18 11:03 Dose:  100 mls/hr


Insulin Aspart (Novolog Vial Sliding Scale -)  1 vial SQ ACHS [facility]; Protocol


   Last Admin: 07/03/18 18:08 Dose:  10 units


Insulin Detemir (Levemir Vial)  27 units SQ AM [facility]


   Last Admin: 07/03/18 07:00 Dose:  27 units


Lactobacillus Acidophilus (Bacid -)  1 tab PO DAILY [facility]


   Last Admin: 07/03/18 11:02 Dose:  1 tab


Levetiracetam (Keppra -)  500 mg PO BID [facility]


   Last Admin: 07/03/18 11:03 Dose:  500 mg


Metoprolol Succinate (Toprol Xl -)  50 mg PO DAILY [facility]


   Last Admin: 07/03/18 11:04 Dose:  50 mg


Pantoprazole Sodium (Protonix -)  40 mg PO DAILY [facility]


   Last Admin: 07/03/18 11:03 Dose:  40 mg


Trazodone HCl (Desyrel -)  50 mg PO HS PRN


   PRN Reason: INSOMNIA


   Last Admin: 07/01/18 21:39 Dose:  50 mg


Vancomycin HCl (Vancomycin Oral Solution)  125 mg PO Q6HPO [facility]


   Last Admin: 07/03/18 18:08 Dose:  125 mg











- Objective


Vital Signs: 


 Vital Signs











Temperature  98.2 F   07/03/18 14:32


 


Pulse Rate  78   07/03/18 14:32


 


Respiratory Rate  16   07/03/18 14:32


 


Blood Pressure  130/80   07/03/18 14:32


 


O2 Sat by Pulse Oximetry (%)  96   07/02/18 21:00











Constitutional: Yes: No Distress


Eyes: Yes: PERRL


Cardiovascular: Yes: Regular Rate and Rhythm


Respiratory: Yes: CTA Bilaterally


Gastrointestinal: Yes: Normal Bowel Sounds


Labs: 


 CBC, BMP





 07/01/18 07:30 





 07/01/18 07:30 





 INR, PTT











INR  0.96  (0.82-1.09)   06/28/18  20:23    














Problem List





- Problems


(1) C. difficile diarrhea


Code(s): A04.72 - ENTEROCOLITIS D/T CLOSTRIDIUM DIFFICILE, NOT SPCF AS RECUR   





(2) UTI (urinary tract infection)


Code(s): N39.0 - URINARY TRACT INFECTION, SITE NOT SPECIFIED   


Qualifiers: 


   Urinary tract infection type: site unspecified   Hematuria presence: without 

hematuria   Qualified Code(s): N39.0 - Urinary tract infection, site not 

specified   





Assessment/Plan





Pt with fever, Hx CDI, UTI





Completing Invanz (day 6/10)

## 2018-07-04 RX ADMIN — VANCOMYCIN HYDROCHLORIDE SCH MG: 250 CAPSULE ORAL at 23:43

## 2018-07-04 RX ADMIN — NYSTATIN PRN APPLIC: 100000 POWDER TOPICAL at 17:37

## 2018-07-04 RX ADMIN — VANCOMYCIN HYDROCHLORIDE SCH MG: 250 CAPSULE ORAL at 12:07

## 2018-07-04 RX ADMIN — LEVETIRACETAM SCH MG: 500 TABLET, FILM COATED ORAL at 11:55

## 2018-07-04 RX ADMIN — Medication SCH TAB: at 11:54

## 2018-07-04 RX ADMIN — HEPARIN SODIUM SCH UNIT: 5000 INJECTION, SOLUTION INTRAVENOUS; SUBCUTANEOUS at 16:01

## 2018-07-04 RX ADMIN — INSULIN ASPART SCH UNITS: 100 INJECTION, SOLUTION INTRAVENOUS; SUBCUTANEOUS at 12:03

## 2018-07-04 RX ADMIN — INSULIN ASPART SCH UNITS: 100 INJECTION, SOLUTION INTRAVENOUS; SUBCUTANEOUS at 17:37

## 2018-07-04 RX ADMIN — VANCOMYCIN HYDROCHLORIDE SCH MG: 250 CAPSULE ORAL at 06:21

## 2018-07-04 RX ADMIN — LEVETIRACETAM SCH MG: 500 TABLET, FILM COATED ORAL at 22:54

## 2018-07-04 RX ADMIN — ASPIRIN 325 MG ORAL TABLET SCH MG: 325 PILL ORAL at 11:55

## 2018-07-04 RX ADMIN — VANCOMYCIN HYDROCHLORIDE SCH MG: 250 CAPSULE ORAL at 17:34

## 2018-07-04 RX ADMIN — HEPARIN SODIUM SCH UNIT: 5000 INJECTION, SOLUTION INTRAVENOUS; SUBCUTANEOUS at 06:21

## 2018-07-04 RX ADMIN — INSULIN ASPART SCH: 100 INJECTION, SOLUTION INTRAVENOUS; SUBCUTANEOUS at 06:21

## 2018-07-04 RX ADMIN — DONEPEZIL HYDROCHLORIDE SCH MG: 10 TABLET, FILM COATED ORAL at 22:55

## 2018-07-04 RX ADMIN — PANTOPRAZOLE SODIUM SCH MG: 40 TABLET, DELAYED RELEASE ORAL at 11:55

## 2018-07-04 RX ADMIN — ACETAMINOPHEN PRN MG: 325 TABLET ORAL at 17:37

## 2018-07-04 RX ADMIN — HEPARIN SODIUM SCH UNIT: 5000 INJECTION, SOLUTION INTRAVENOUS; SUBCUTANEOUS at 22:53

## 2018-07-04 RX ADMIN — ATORVASTATIN CALCIUM SCH MG: 10 TABLET, FILM COATED ORAL at 22:55

## 2018-07-04 RX ADMIN — INSULIN ASPART SCH UNITS: 100 INJECTION, SOLUTION INTRAVENOUS; SUBCUTANEOUS at 22:55

## 2018-07-04 RX ADMIN — ERTAPENEM SODIUM SCH MLS/HR: 1 INJECTION, POWDER, LYOPHILIZED, FOR SOLUTION INTRAMUSCULAR; INTRAVENOUS at 11:55

## 2018-07-04 RX ADMIN — INSULIN DETEMIR SCH UNITS: 100 INJECTION, SOLUTION SUBCUTANEOUS at 06:20

## 2018-07-04 RX ADMIN — AMLODIPINE BESYLATE SCH MG: 10 TABLET ORAL at 11:54

## 2018-07-04 NOTE — PN
Physical Exam: 


SUBJECTIVE: 


Patient is a 67 y/o female with c. diff, klebsiella UTI, dementia, DM, CAD, HLD

, HTN and NHL s/p chemo who is admitted for sepsis. Patient had no acute events 

overnight. Daughter said patient is doing well, had soft bowel movement this 

morning. No complaints.








OBJECTIVE:





 Vital Signs











 Period  Temp  Pulse  Resp  BP Sys/Silver  Pulse Ox


 


 Last 24 Hr  97.7 F-98.2 F  66-78  16-18  130-156/80-89  98











GENERAL: The patient is awake, alert, patient is non verbal at baseline


HEAD: Normal with no signs of trauma.


LUNGS: Breath sounds equal, clear to auscultation bilaterally 


HEART: Regular rate and rhythm


ABDOMEN: Soft, nontender, nondistended














 Laboratory Results - last 24 hr











  07/03/18 07/03/18 07/03/18





  11:15 16:27 16:32


 


POC Glucometer  323  401  413














  07/03/18 07/04/18





  21:17 06:16


 


POC Glucometer  259  185








Active Medications











Generic Name Dose Route Start Last Admin





  Trade Name Freq  PRN Reason Stop Dose Admin


 


Acetaminophen  650 mg  06/29/18 03:21  07/03/18 20:04





  Tylenol -  PO   650 mg





  Q4H PRN   Administration





  FEVER   





     





     





     


 


Amlodipine Besylate  10 mg  07/02/18 10:00  07/03/18 11:03





  Norvasc -  PO   10 mg





  DAILY [facility]   Administration





     





     





     





     


 


Aspirin  325 mg  06/30/18 10:00  07/02/18 10:37





  Asa -  PO   325 mg





  Q48H [facility]   Administration





     





     





     





     


 


Atorvastatin Calcium  10 mg  06/29/18 22:00  07/03/18 23:01





  Lipitor -  PO   10 mg





  HS FESTUS   Administration





     





     





     





     


 


Donepezil HCl  10 mg  06/29/18 22:00  07/03/18 23:01





  Aricept -  PO   10 mg





  HS FESTUS   Administration





     





     





     





     


 


Heparin Sodium (Porcine)  5,000 unit  06/29/18 14:00  07/04/18 06:21





  Heparin -  SQ   5,000 unit





  TID [facility]   Administration





     





     





     





     


 


IV Flush  8 ml  07/01/18 16:26  





  Picc Line Flush  IVPUSH   





  PRN PRN   





  Protocol   





     





     





     


 


IV Flush  8 ml  07/03/18 10:13  





  Picc Line Flush  IVPUSH   





  PRN PRN   





  Protocol   





     





     





     


 


Ertapenem 1 gm/ Sodium  50 mls @ 100 mls/hr  06/30/18 13:15  07/03/18 11:03





  Chloride  IVPB   100 mls/hr





  DAILY FEST[facility]   Administration





     





     





     





     


 


Insulin Aspart  1 vial  06/29/18 22:00  07/04/18 06:21





  Novolog Vial Sliding Scale -  SQ   Not Given





  [facility]   





     





     





  Protocol   





     


 


Insulin Detemir  27 units  07/02/18 17:59  07/04/18 06:20





  Levemir Vial  SQ   27 units





  AM [facility]   Administration





     





     





     





     


 


Lactobacillus Acidophilus  1 tab  07/01/18 10:00  07/03/18 11:02





  Bacid -  PO   1 tab





  DAILY FESTUS   Administration





     





     





     





     


 


Levetiracetam  500 mg  06/29/18 22:00  07/03/18 23:01





  Keppra -  PO   500 mg





  BID [facility]   Administration





     





     





     





     


 


Metoprolol Succinate  50 mg  07/03/18 10:00  07/03/18 11:04





  Toprol Xl -  PO   50 mg





  DAILY FESTUS   Administration





     





     





     





     


 


Nystatin  1 applic  07/03/18 18:34  07/03/18 23:08





  Nystop Powder -  TP   1 applic





  Q8H PRN   Administration





  ITCHINESS   





     





     





     


 


Pantoprazole Sodium  40 mg  06/29/18 10:00  07/03/18 11:03





  Protonix -  PO   40 mg





  DAILY FESTUS   Administration





     





     





     





     


 


Trazodone HCl  50 mg  06/29/18 03:25  07/03/18 23:01





  Desyrel -  PO   50 mg





  HS PRN   Administration





  INSOMNIA   





     





     





     


 


Vancomycin HCl  125 mg  06/29/18 00:00  07/04/18 06:21





  Vancomycin Oral Solution  PO   125 mg





  Q6HPO [facility]   Administration





     





     





     





     


 


Zinc Oxide/Panthenol/Vitamin E  1 applic  07/03/18 18:31  07/03/18 23:09





  Balmex Cream -  TP   1 applic





  Q8H PRN   Administration





  ITCHY SKIN   





     





     





     











ASSESSMENT/PLAN:


Patient is a 67 y/o female with c. diff and klebsiella UTI who is admitted for 

sepsis 2/2 to UTI of lactose fermenting gram negative bacilli.





#Sepsis


   -Ucx found to have lactose fermenting gram negative bacilli


   -Ertapenem day  (5 of 10 days), discuss with ID treatment duration (change 

from 7 days to 10)


   -patient afebrile


   -f/u ID and nephrology outpatient





#C.diff


   -Continue PO Vanco for C. diff treatment (day 8/14)





#DM


   -Continue levemir and SSI





#Seizure


   -Continue home medications





#DVT PPX:


   -Continue Heparin





Dispo: Discuss peripheral line vs PICC for outpatient Ertapenem (10 days in 

total, per Dr. Blackmon ID), discuss home nurse to administer medication





Visit type





- Emergency Visit


Emergency Visit: No





- New Patient


This patient is new to me today: No





- Critical Care


Critical Care patient: No

## 2018-07-04 NOTE — PN
Teaching Attending Note


Name of Resident: Precious Carpio





ATTENDING PHYSICIAN STATEMENT





I saw and evaluated the patient.


I reviewed the resident's note and discussed the case with the resident.


I agree with the resident's findings and plan as documented.








SUBJECTIVE:








OBJECTIVE:





 Vital Signs











Temperature  97.7 F   07/04/18 06:00


 


Pulse Rate  66   07/04/18 06:00


 


Respiratory Rate  18   07/04/18 06:00


 


Blood Pressure  130/81   07/04/18 06:00


 


O2 Sat by Pulse Oximetry (%)  98   07/03/18 21:00








 CBCD











WBC  5.3 K/mm3 (4.0-10.0)   07/01/18  07:30    


 


RBC  4.36 M/mm3 (3.60-5.2)   07/01/18  07:30    


 


Hgb  12.1 GM/dL (10.7-15.3)   07/01/18  07:30    


 


Hct  35.5 % (32.4-45.2)   07/01/18  07:30    


 


MCV  81.4 fl (80-96)   07/01/18  07:30    


 


MCHC  34.0 g/dl (32.0-36.0)   07/01/18  07:30    


 


RDW  14.6 % (11.6-15.6)   07/01/18  07:30    


 


Plt Count  160 K/MM3 (134-434)   07/01/18  07:30    


 


MPV  8.5 fl (7.5-11.1)   07/01/18  07:30    








 CMP











Sodium  142 mmol/L (136-145)   07/01/18  07:30    


 


Potassium  3.8 mmol/L (3.5-5.1)   07/01/18  07:30    


 


Chloride  108 mmol/L ()  H  07/01/18  07:30    


 


Carbon Dioxide  24 mmol/L (21-32)   07/01/18  07:30    


 


Anion Gap  10  (8-16)   07/01/18  07:30    


 


BUN  11 mg/dL (7-18)   07/01/18  07:30    


 


Creatinine  0.6 mg/dL (0.55-1.02)   07/01/18  07:30    


 


Creat Clearance w eGFR  > 60  (>60)   07/01/18  07:30    


 


Random Glucose  176 mg/dL ()  H  07/01/18  07:30    


 


Calcium  7.9 mg/dL (8.5-10.1)  L  07/01/18  07:30    


 


Total Bilirubin  0.3 mg/dL (0.2-1.0)   07/01/18  07:30    


 


AST  16 U/L (15-37)   07/01/18  07:30    


 


ALT  24 U/L (12-78)   07/01/18  07:30    


 


Alkaline Phosphatase  67 U/L ()   07/01/18  07:30    


 


Total Protein  6.3 g/dl (6.4-8.2)  L  07/01/18  07:30    


 


Albumin  2.8 g/dl (3.4-5.0)  L  07/01/18  07:30    








 CARDIAC ENZYMES











Creatine Kinase  62 IU/L ()   06/28/18  20:23    


 


Troponin I  < 0.02 ng/ml (0.00-0.05)   06/28/18  20:23    








 Current Medications











Generic Name Dose Route Start Last Admin





  Trade Name Freq  PRN Reason Stop Dose Admin


 


Acetaminophen  650 mg  06/29/18 03:21  07/03/18 20:04





  Tylenol -  PO   650 mg





  Q4H PRN   Administration





  FEVER   





     





     





     


 


Amlodipine Besylate  10 mg  07/02/18 10:00  07/03/18 11:03





  Norvasc -  PO   10 mg





  DAILY FESTUS   Administration





     





     





     





     


 


Aspirin  325 mg  06/30/18 10:00  07/02/18 10:37





  Asa -  PO   325 mg





  Q48H FEST[facility]   Administration





     





     





     





     


 


Atorvastatin Calcium  10 mg  06/29/18 22:00  07/03/18 23:01





  Lipitor -  PO   10 mg





  HS FESTUS   Administration





     





     





     





     


 


Donepezil HCl  10 mg  06/29/18 22:00  07/03/18 23:01





  Aricept -  PO   10 mg





  HS FESTUS   Administration





     





     





     





     


 


Heparin Sodium (Porcine)  5,000 unit  06/29/18 14:00  07/04/18 06:21





  Heparin -  SQ   5,000 unit





  TID [facility]   Administration





     





     





     





     


 


IV Flush  8 ml  07/01/18 16:26  





  Picc Line Flush  IVPUSH   





  PRN PRN   





  Protocol   





     





     





     


 


IV Flush  8 ml  07/03/18 10:13  





  Picc Line Flush  IVPUSH   





  PRN PRN   





  Protocol   





     





     





     


 


Ertapenem 1 gm/ Sodium  50 mls @ 100 mls/hr  06/30/18 13:15  07/03/18 11:03





  Chloride  IVPB   100 mls/hr





  DAILY FESTUS   Administration





     





     





     





     


 


Insulin Aspart  1 vial  06/29/18 22:00  07/04/18 06:21





  Novolog Vial Sliding Scale -  SQ   Not Given





  [facility]   





     





     





  Protocol   





     


 


Insulin Detemir  27 units  07/02/18 17:59  07/04/18 06:20





  Levemir Vial  SQ   27 units





  AM [facility]   Administration





     





     





     





     


 


Lactobacillus Acidophilus  1 tab  07/01/18 10:00  07/03/18 11:02





  Bacid -  PO   1 tab





  DAILY FESTUS   Administration





     





     





     





     


 


Levetiracetam  500 mg  06/29/18 22:00  07/03/18 23:01





  Keppra -  PO   500 mg





  BID [facility]   Administration





     





     





     





     


 


Metoprolol Succinate  50 mg  07/03/18 10:00  07/03/18 11:04





  Toprol Xl -  PO   50 mg





  DAILY FESTUS   Administration





     





     





     





     


 


Nystatin  1 applic  07/03/18 18:34  07/03/18 23:08





  Nystop Powder -  TP   1 applic





  Q8H PRN   Administration





  ITCHINESS   





     





     





     


 


Pantoprazole Sodium  40 mg  06/29/18 10:00  07/03/18 11:03





  Protonix -  PO   40 mg





  DAILY FESTUS   Administration





     





     





     





     


 


Trazodone HCl  50 mg  06/29/18 03:25  07/03/18 23:01





  Desyrel -  PO   50 mg





  HS PRN   Administration





  INSOMNIA   





     





     





     


 


Vancomycin HCl  125 mg  06/29/18 00:00  07/04/18 06:21





  Vancomycin Oral Solution  PO   125 mg





  Q6HPO [facility]   Administration





     





     





     





     


 


Zinc Oxide/Panthenol/Vitamin E  1 applic  07/03/18 18:31  07/03/18 23:09





  Balmex Cream -  TP   1 applic





  Q8H PRN   Administration





  ITCHY SKIN   





     





     





     








 Home Medications











 Medication  Instructions  Recorded


 


Metformin HCl 1,000 mg PO BIDAC 09/25/14


 


Pravastatin Sodium 10 mg PO HS 03/28/18


 


Donepezil HCl 10 mg PO HS 03/29/18


 


Insulin Glargine,Hum.rec.anlog 30 unit SQ DAILY 03/29/18





[Lantus Solostar]  


 


Pantoprazole Sodium [Protonix] 40 mg PO DAILY 03/29/18


 


traZODone HCL [Desyrel -] 50 mg PO HS  tablet 05/04/18


 


Aspirin [ASA -] 325 mg PO Q48H 06/29/18


 


Zolpidem Tartrate [Ambien] 5 mg PO HS 06/29/18


 


levETIRAcetam [Keppra -] 500 mg PO BID 06/29/18


 


Ertapenem Sodium - 1 Gram [Invanz 1 gm IVPB DAILY #4 bag 07/02/18





(Pre-Docked)]  


 


Metoprolol Succinate 50 mg PO 07/02/18


 


Amlodipine Besylate [Norvasc -] 10 mg PO DAILY #30 tablet 07/03/18


 


Vancomycin HCl 125 mg PO Q6H #28 capsule 07/03/18











ASSESSMENT AND PLAN:


Patient is a 67 y/o lady with hx of  dementia , non verbal, treated Non Hodgkin'

s lymphhoma , DM , HTN, recent hospitalization for ESBL klebsiella UTI, recent 

seizures who presented with fever . 





# S/p Sepsis due to UTI: cx with Klebsiella ESBL. on ertapenam day 7/10  ,no 

mechanical obstruction , and no intervention , f/u with ID and urology as out 

pt 





# T2DM: cont SSI and  levemir 





# H/o recent seizures. Cont keppra. 





# HTN: cont BB and increased norvasc





DVt Px: Heparin

## 2018-07-04 NOTE — HOSP
Subjective





- Review of Symptoms


Events since last encounter: 





Pt is non verbal, family notes that pt is experiencing itching discomfort in 

vaginal/labia area. Pt currently receiving nystatin powder for fungal infx, 

which has been improving 


Genitourinary: Yes: Other


Other Systems: 





skin: itching in perineum and labia  





Physical Examination


Vital Signs: 


 Vital Signs











Temperature  98.2 F   07/04/18 15:11


 


Pulse Rate  70   07/04/18 15:11


 


Respiratory Rate  18   07/04/18 15:11


 


Blood Pressure  120/78   07/04/18 15:11


 


O2 Sat by Pulse Oximetry (%)  98   07/04/18 09:00











Integumentary: Yes: Rash (rash in perineum/labial folds)


Labs: 


 CBC, BMP





 07/01/18 07:30 





 07/01/18 07:30 











Hospitalist Encounter


Assessment: 





Pt currently receiving nystatin powder for fungal infx, which has been 

improving 


experiencing itching in perineum/labial folds. Rash noted


-will give topical Benadryl cream 2%





Visit type





- Emergency Visit


Emergency Visit: No





- New Patient


This patient is new to me today: No





- Critical Care


Critical Care patient: No

## 2018-07-05 RX ADMIN — AMLODIPINE BESYLATE SCH MG: 10 TABLET ORAL at 10:56

## 2018-07-05 RX ADMIN — LEVETIRACETAM SCH MG: 500 TABLET, FILM COATED ORAL at 10:56

## 2018-07-05 RX ADMIN — DONEPEZIL HYDROCHLORIDE SCH MG: 10 TABLET, FILM COATED ORAL at 21:48

## 2018-07-05 RX ADMIN — VANCOMYCIN HYDROCHLORIDE SCH MG: 250 CAPSULE ORAL at 23:05

## 2018-07-05 RX ADMIN — ERTAPENEM SODIUM SCH MLS/HR: 1 INJECTION, POWDER, LYOPHILIZED, FOR SOLUTION INTRAMUSCULAR; INTRAVENOUS at 11:01

## 2018-07-05 RX ADMIN — LEVETIRACETAM SCH MG: 500 TABLET, FILM COATED ORAL at 21:49

## 2018-07-05 RX ADMIN — HEPARIN SODIUM SCH UNIT: 5000 INJECTION, SOLUTION INTRAVENOUS; SUBCUTANEOUS at 14:45

## 2018-07-05 RX ADMIN — ATORVASTATIN CALCIUM SCH MG: 10 TABLET, FILM COATED ORAL at 21:49

## 2018-07-05 RX ADMIN — INSULIN ASPART SCH: 100 INJECTION, SOLUTION INTRAVENOUS; SUBCUTANEOUS at 06:52

## 2018-07-05 RX ADMIN — HEPARIN SODIUM SCH UNIT: 5000 INJECTION, SOLUTION INTRAVENOUS; SUBCUTANEOUS at 06:51

## 2018-07-05 RX ADMIN — VANCOMYCIN HYDROCHLORIDE SCH MG: 250 CAPSULE ORAL at 11:02

## 2018-07-05 RX ADMIN — Medication SCH TAB: at 10:56

## 2018-07-05 RX ADMIN — PANTOPRAZOLE SODIUM SCH MG: 40 TABLET, DELAYED RELEASE ORAL at 10:56

## 2018-07-05 RX ADMIN — INSULIN ASPART SCH: 100 INJECTION, SOLUTION INTRAVENOUS; SUBCUTANEOUS at 18:47

## 2018-07-05 RX ADMIN — VANCOMYCIN HYDROCHLORIDE SCH MG: 250 CAPSULE ORAL at 18:48

## 2018-07-05 RX ADMIN — HEPARIN SODIUM SCH UNIT: 5000 INJECTION, SOLUTION INTRAVENOUS; SUBCUTANEOUS at 21:49

## 2018-07-05 RX ADMIN — INSULIN DETEMIR SCH UNITS: 100 INJECTION, SOLUTION SUBCUTANEOUS at 06:51

## 2018-07-05 RX ADMIN — VANCOMYCIN HYDROCHLORIDE SCH MG: 250 CAPSULE ORAL at 06:51

## 2018-07-05 RX ADMIN — INSULIN ASPART SCH UNITS: 100 INJECTION, SOLUTION INTRAVENOUS; SUBCUTANEOUS at 21:49

## 2018-07-05 RX ADMIN — TRAZODONE HYDROCHLORIDE PRN MG: 50 TABLET ORAL at 23:06

## 2018-07-05 RX ADMIN — INSULIN ASPART SCH UNITS: 100 INJECTION, SOLUTION INTRAVENOUS; SUBCUTANEOUS at 11:47

## 2018-07-05 NOTE — EKG
Test Reason : 

Blood Pressure : ***/*** mmHG

Vent. Rate : 064 BPM     Atrial Rate : 064 BPM

   P-R Int : 188 ms          QRS Dur : 096 ms

    QT Int : 468 ms       P-R-T Axes : 049 -49 081 degrees

   QTc Int : 482 ms

 

NORMAL SINUS RHYTHM

LEFT AXIS DEVIATION

MODERATE VOLTAGE CRITERIA FOR LVH, MAY BE NORMAL VARIANT

CANNOT RULE OUT SEPTAL INFARCT (CITED ON OR BEFORE 05-APR-2018)

ABNORMAL ECG

WHEN COMPARED WITH ECG OF 28-JUN-2018 21:44,

T WAVE INVERSION MORE EVIDENT IN LATERAL LEADS

Confirmed by MAEVE LARIOS MD ([id number]) on 7/5/2018 11:26:00 AM

 

Referred By:             Confirmed By:MAEVE LARIOS MD

## 2018-07-05 NOTE — PN
Teaching Attending Note


Name of Resident: Precious Carpio





ATTENDING PHYSICIAN STATEMENT





I saw and evaluated the patient.


I reviewed the resident's note and discussed the case with the resident.


I agree with the resident's findings and plan as documented.








SUBJECTIVE:








OBJECTIVE:


 Vital Signs











Temperature  97.6 F   07/05/18 17:52


 


Pulse Rate  66   07/05/18 17:52


 


Respiratory Rate  20   07/05/18 17:52


 


Blood Pressure  182/69   07/05/18 17:52


 


O2 Sat by Pulse Oximetry (%)  98   07/05/18 09:00








 CBCD











WBC  5.3 K/mm3 (4.0-10.0)   07/01/18  07:30    


 


RBC  4.36 M/mm3 (3.60-5.2)   07/01/18  07:30    


 


Hgb  12.1 GM/dL (10.7-15.3)   07/01/18  07:30    


 


Hct  35.5 % (32.4-45.2)   07/01/18  07:30    


 


MCV  81.4 fl (80-96)   07/01/18  07:30    


 


MCHC  34.0 g/dl (32.0-36.0)   07/01/18  07:30    


 


RDW  14.6 % (11.6-15.6)   07/01/18  07:30    


 


Plt Count  160 K/MM3 (134-434)   07/01/18  07:30    


 


MPV  8.5 fl (7.5-11.1)   07/01/18  07:30    








 CMP











Sodium  142 mmol/L (136-145)   07/01/18  07:30    


 


Potassium  3.8 mmol/L (3.5-5.1)   07/01/18  07:30    


 


Chloride  108 mmol/L ()  H  07/01/18  07:30    


 


Carbon Dioxide  24 mmol/L (21-32)   07/01/18  07:30    


 


Anion Gap  10  (8-16)   07/01/18  07:30    


 


BUN  11 mg/dL (7-18)   07/01/18  07:30    


 


Creatinine  0.6 mg/dL (0.55-1.02)   07/01/18  07:30    


 


Creat Clearance w eGFR  > 60  (>60)   07/01/18  07:30    


 


Random Glucose  176 mg/dL ()  H  07/01/18  07:30    


 


Calcium  7.9 mg/dL (8.5-10.1)  L  07/01/18  07:30    


 


Total Bilirubin  0.3 mg/dL (0.2-1.0)   07/01/18  07:30    


 


AST  16 U/L (15-37)   07/01/18  07:30    


 


ALT  24 U/L (12-78)   07/01/18  07:30    


 


Alkaline Phosphatase  67 U/L ()   07/01/18  07:30    


 


Total Protein  6.3 g/dl (6.4-8.2)  L  07/01/18  07:30    


 


Albumin  2.8 g/dl (3.4-5.0)  L  07/01/18  07:30    








 CARDIAC ENZYMES











Creatine Kinase  62 IU/L ()   06/28/18  20:23    


 


Troponin I  < 0.02 ng/ml (0.00-0.05)   06/28/18  20:23    








 Current Medications











Generic Name Dose Route Start Last Admin





  Trade Name Freq  PRN Reason Stop Dose Admin


 


Acetaminophen  650 mg  06/29/18 03:21  07/04/18 17:37





  Tylenol -  PO   650 mg





  Q4H PRN   Administration





  FEVER   





     





     





     


 


Amlodipine Besylate  10 mg  07/02/18 10:00  07/05/18 10:56





  Norvasc -  PO   10 mg





  DAILY FESTUS   Administration





     





     





     





     


 


Aspirin  325 mg  06/30/18 10:00  07/04/18 11:55





  Asa -  PO   325 mg





  Q48H FESTUS   Administration





     





     





     





     


 


Atorvastatin Calcium  10 mg  06/29/18 22:00  07/04/18 22:55





  Lipitor -  PO   10 mg





  HS FESTUS   Administration





     





     





     





     


 


Donepezil HCl  10 mg  06/29/18 22:00  07/04/18 22:55





  Aricept -  PO   10 mg





  HS FESTUS   Administration





     





     





     





     


 


Heparin Sodium (Porcine)  5,000 unit  06/29/18 14:00  07/05/18 14:45





  Heparin -  SQ   5,000 unit





  TID FESTUS   Administration





     





     





     





     


 


IV Flush  8 ml  07/01/18 16:26  





  Picc Line Flush  IVPUSH   





  PRN PRN   





  Protocol   





     





     





     


 


IV Flush  8 ml  07/03/18 10:13  





  Picc Line Flush  IVPUSH   





  PRN PRN   





  Protocol   





     





     





     


 


Ertapenem 1 gm/ Sodium  50 mls @ 100 mls/hr  06/30/18 13:15  07/05/18 11:01





  Chloride  IVPB   100 mls/hr





  DAILY FESTUS   Administration





     





     





     





     


 


Insulin Aspart  1 vial  06/29/18 22:00  07/05/18 18:47





  Novolog Vial Sliding Scale -  SQ   Not Given





  [facility]   





     





     





  Protocol   





     


 


Insulin Detemir  27 units  07/02/18 17:59  07/05/18 06:51





  Levemir Vial  SQ   27 units





  AM FESTUS   Administration





     





     





     





     


 


Lactobacillus Acidophilus  1 tab  07/01/18 10:00  07/05/18 10:56





  Bacid -  PO   1 tab





  DAILY FESTUS   Administration





     





     





     





     


 


Levetiracetam  500 mg  06/29/18 22:00  07/05/18 10:56





  Keppra -  PO   500 mg





  BID FESTUS   Administration





     





     





     





     


 


Metoprolol Succinate  50 mg  07/03/18 10:00  07/05/18 10:56





  Toprol Xl -  PO   50 mg





  DAILY FESTUS   Administration





     





     





     





     


 


Nystatin  1 applic  07/03/18 18:34  07/04/18 17:37





  Nystop Powder -  TP   1 applic





  Q8H PRN   Administration





  ITCHINESS   





     





     





     


 


Pantoprazole Sodium  40 mg  06/29/18 10:00  07/05/18 10:56





  Protonix -  PO   40 mg





  DAILY FESTUS   Administration





     





     





     





     


 


Trazodone HCl  50 mg  06/29/18 03:25  07/03/18 23:01





  Desyrel -  PO   50 mg





  HS PRN   Administration





  INSOMNIA   





     





     





     


 


Vancomycin HCl  125 mg  06/29/18 00:00  07/05/18 18:48





  Vancomycin Oral Solution  PO   125 mg





  Q6HPO FESTUS   Administration





     





     





     





     


 


Zinc Oxide/Panthenol/Vitamin E  1 applic  07/03/18 18:31  07/03/18 23:09





  Balmex Cream -  TP   1 applic





  Q8H PRN   Administration





  ITCHY SKIN   





     





     





     








 Home Medications











 Medication  Instructions  Recorded


 


Metformin HCl 1,000 mg PO BIDAC 09/25/14


 


Pravastatin Sodium 10 mg PO HS 03/28/18


 


Donepezil HCl 10 mg PO HS 03/29/18


 


Insulin Glargine,Hum.rec.anlog 30 unit SQ DAILY 03/29/18





[Lantus Solostar]  


 


Pantoprazole Sodium [Protonix] 40 mg PO DAILY 03/29/18


 


traZODone HCL [Desyrel -] 50 mg PO HS  tablet 05/04/18


 


Aspirin [ASA -] 325 mg PO Q48H 06/29/18


 


Zolpidem Tartrate [Ambien] 5 mg PO HS 06/29/18


 


levETIRAcetam [Keppra -] 500 mg PO BID 06/29/18


 


Metoprolol Succinate 50 mg PO 07/02/18


 


Amlodipine Besylate [Norvasc -] 10 mg PO DAILY #30 tablet 07/03/18

















ASSESSMENT AND PLAN:


Patient is a 69 y/o lady with hx of  dementia , non verbal, treated Non Hodgkin'

s lymphhoma , DM , HTN, recent hospitalization for ESBL klebsiella UTI, recent 

seizures who presented with fever . 





# S/p Sepsis due to UTI: cx with Klebsiella ESBL. on ertapenam day 9/10  ,  no 

mechanical obstruction , and no intervention 


- f/u with ID and ,urology as out pt 





# DM: cont SSI and  levemir 





# H/o recent seizures. Cont keppra. 





# HTN: cont BB and increased norvasc





DVT Px: heparin

## 2018-07-05 NOTE — PN
Physical Exam: 


SUBJECTIVE: 


Patient is a 67 y/o female with c. diff, klebsiella UTI, dementia, DM, CAD, HLD

, HTN and NHL s/p chemo who is admitted for sepsis. Patient had no acute events 

overnight. Daughter said patient is doing well, had soft bowel movement this 

morning. No complaints.








OBJECTIVE:





 Vital Signs











 Period  Temp  Pulse  Resp  BP Sys/Silver  Pulse Ox


 


 Last 24 Hr  96.7 F-98.2 F  55-70  18-20  120-154/67-90  98-98











GENERAL: The patient is awake, alert, patient is non verbal at baseline


HEAD: Normal with no signs of trauma.


LUNGS: Breath sounds equal, clear to auscultation bilaterally 


HEART: Regular rate and rhythm


ABDOMEN: Soft, nontender, nondistended











 Laboratory Results - last 24 hr











  07/04/18 07/04/18 07/04/18





  12:02 17:36 23:04


 


POC Glucometer  326  306  288














  07/05/18





  05:54


 


POC Glucometer  170








Active Medications











Generic Name Dose Route Start Last Admin





  Trade Name Freq  PRN Reason Stop Dose Admin


 


Acetaminophen  650 mg  06/29/18 03:21  07/04/18 17:37





  Tylenol -  PO   650 mg





  Q4H PRN   Administration





  FEVER   





     





     





     


 


Amlodipine Besylate  10 mg  07/02/18 10:00  07/04/18 11:54





  Norvasc -  PO   10 mg





  DAILY FESTUS   Administration





     





     





     





     


 


Aspirin  325 mg  06/30/18 10:00  07/04/18 11:55





  Asa -  PO   325 mg





  Q48H FESTUS   Administration





     





     





     





     


 


Atorvastatin Calcium  10 mg  06/29/18 22:00  07/04/18 22:55





  Lipitor -  PO   10 mg





  HS FESTUS   Administration





     





     





     





     


 


Donepezil HCl  10 mg  06/29/18 22:00  07/04/18 22:55





  Aricept -  PO   10 mg





  HS FESTUS   Administration





     





     





     





     


 


Heparin Sodium (Porcine)  5,000 unit  06/29/18 14:00  07/05/18 06:51





  Heparin -  SQ   5,000 unit





  TID FESTUS   Administration





     





     





     





     


 


IV Flush  8 ml  07/01/18 16:26  





  Picc Line Flush  IVPUSH   





  PRN PRN   





  Protocol   





     





     





     


 


IV Flush  8 ml  07/03/18 10:13  





  Picc Line Flush  IVPUSH   





  PRN PRN   





  Protocol   





     





     





     


 


Ertapenem 1 gm/ Sodium  50 mls @ 100 mls/hr  06/30/18 13:15  07/04/18 11:55





  Chloride  IVPB   100 mls/hr





  DAILY FESTUS   Administration





     





     





     





     


 


Insulin Aspart  1 vial  06/29/18 22:00  07/05/18 06:52





  Novolog Vial Sliding Scale -  SQ   Not Given





  [facility]   





     





     





  Protocol   





     


 


Insulin Detemir  27 units  07/02/18 17:59  07/05/18 06:51





  Levemir Vial  SQ   27 units





  AM FESTUS   Administration





     





     





     





     


 


Lactobacillus Acidophilus  1 tab  07/01/18 10:00  07/04/18 11:54





  Bacid -  PO   1 tab





  DAILY FESTUS   Administration





     





     





     





     


 


Levetiracetam  500 mg  06/29/18 22:00  07/04/18 22:54





  Keppra -  PO   500 mg





  BID FESTUS   Administration





     





     





     





     


 


Metoprolol Succinate  50 mg  07/03/18 10:00  07/04/18 11:54





  Toprol Xl -  PO   50 mg





  DAILY FESTUS   Administration





     





     





     





     


 


Nystatin  1 applic  07/03/18 18:34  07/04/18 17:37





  Nystop Powder -  TP   1 applic





  Q8H PRN   Administration





  ITCHINESS   





     





     





     


 


Pantoprazole Sodium  40 mg  06/29/18 10:00  07/04/18 11:55





  Protonix -  PO   40 mg





  DAILY FESTUS   Administration





     





     





     





     


 


Trazodone HCl  50 mg  06/29/18 03:25  07/03/18 23:01





  Desyrel -  PO   50 mg





  HS PRN   Administration





  INSOMNIA   





     





     





     


 


Vancomycin HCl  125 mg  06/29/18 00:00  07/05/18 06:51





  Vancomycin Oral Solution  PO   125 mg





  Q6HPO FESTUS   Administration





     





     





     





     


 


Zinc Oxide/Panthenol/Vitamin E  1 applic  07/03/18 18:31  07/03/18 23:09





  Balmex Cream -  TP   1 applic





  Q8H PRN   Administration





  ITCHY SKIN   





     





     





     











ASSESSMENT/PLAN:


Patient is a 67 y/o female with c. diff and klebsiella UTI who is admitted for 

sepsis 2/2 to UTI of lactose fermenting gram negative bacilli.





#Sepsis


   -Ucx found to have lactose fermenting gram negative bacilli


   -Ertapenem day  6/7 per Dr. Blackmon


   -patient afebrile


   -f/u ID and nephrology outpatient





#C.diff


   -Continue PO Vanco for C. diff treatment 





#DM


   -Continue levemir and SSI





#Seizure


   -Continue home medications





#DVT PPX:


   -Continue Heparin





Dispo: Day 6/7 for Ertapenem, plan to DC tomorrow following last dose of 

antibiotic








Visit type





- Emergency Visit


Emergency Visit: No





- New Patient


This patient is new to me today: No





- Critical Care


Critical Care patient: No

## 2018-07-06 VITALS — TEMPERATURE: 97.8 F | SYSTOLIC BLOOD PRESSURE: 138 MMHG | HEART RATE: 70 BPM | DIASTOLIC BLOOD PRESSURE: 78 MMHG

## 2018-07-06 RX ADMIN — HEPARIN SODIUM SCH UNIT: 5000 INJECTION, SOLUTION INTRAVENOUS; SUBCUTANEOUS at 06:22

## 2018-07-06 RX ADMIN — ASPIRIN 325 MG ORAL TABLET SCH MG: 325 PILL ORAL at 09:26

## 2018-07-06 RX ADMIN — INSULIN DETEMIR SCH UNITS: 100 INJECTION, SOLUTION SUBCUTANEOUS at 06:22

## 2018-07-06 RX ADMIN — PANTOPRAZOLE SODIUM SCH MG: 40 TABLET, DELAYED RELEASE ORAL at 09:26

## 2018-07-06 RX ADMIN — ERTAPENEM SODIUM SCH MLS/HR: 1 INJECTION, POWDER, LYOPHILIZED, FOR SOLUTION INTRAMUSCULAR; INTRAVENOUS at 09:26

## 2018-07-06 RX ADMIN — INSULIN ASPART SCH: 100 INJECTION, SOLUTION INTRAVENOUS; SUBCUTANEOUS at 06:21

## 2018-07-06 RX ADMIN — AMLODIPINE BESYLATE SCH MG: 10 TABLET ORAL at 09:26

## 2018-07-06 RX ADMIN — Medication SCH TAB: at 09:26

## 2018-07-06 RX ADMIN — VANCOMYCIN HYDROCHLORIDE SCH MG: 250 CAPSULE ORAL at 06:22

## 2018-07-06 RX ADMIN — LEVETIRACETAM SCH MG: 500 TABLET, FILM COATED ORAL at 09:26

## 2018-07-06 NOTE — DS
Physical Exam: 


SUBJECTIVE:


Patient is a 67 y/o female with c. diff, klebsiella UTI, dementia, DM, CAD, HLD

, HTN and NHL s/p chemo who is admitted for sepsis. Patient had no acute events 

overnight. No complaints. Family is ready to take her home today. 








OBJECTIVE:





 Vital Signs











 Period  Temp  Pulse  Resp  BP Sys/Silver  Pulse Ox


 


 Last 24 Hr  97.2 F-97.8 F  66-70  20-20  138-182/69-89  98-98








PHYSICAL EXAM





GENERAL: The patient is awake, alert, patient is non verbal at baseline


HEAD: Normal with no signs of trauma.


LUNGS: Breath sounds equal, clear to auscultation bilaterally 


HEART: Regular rate and rhythm


ABDOMEN: Soft, nontender, nondistended





LABS


 Laboratory Results - last 24 hr











  07/05/18 07/05/18 07/06/18





  17:50 21:47 06:20


 


POC Glucometer  176  306  197











HOSPITAL COURSE:





Date of Admission:06/29/18





Patient is a 67 y/o female with a past medical history of c. diff, klebsiella 

UTI, dementia, IDDM, CAD, HLD, HTN and NHL s/p chemo 2013 who was admitted for 

severe sepsis 2/2 to UTI. She presented in the ED with fever and tremors. She 

was also found to have leukocytosis and lactic acidosis. She was given Zosyn, 

Flagyl and Oral Vancomycin for her continued C. diff treatment.





UCX was found to be lactose fermenting gram negative bacilli and patient was 

began on Ertapenem. Patient completed 7 days of IV antibiotics. 





Patients Levemir was increased to 25 units and she was placed on a sliding 

scale to properly manage her diabetes. 





Patient referred to Dr. Anderson.








Date of Discharge: 07/06/18











Minutes to complete discharge: 42





Discharge Summary


Reason For Visit: CDIFF DIARRHEA/UTI/HYDROURETERONEPHROSIS


Condition: Guarded





- Instructions


Diet, Activity, Other Instructions: 


You were admitted to the hospital due to an infection in your urine. The 

infection was the cause of your fever and shaking.  The antibiotics helped to 

bring down the fever and clear the infection. 





You will continue to take Vancomycin by mouth to treat the C. diff diarrhea. 

Take one pill every 6 hours, for one week by mouth.





A imaging study of your kidney showed you have an abnormal urinary tract, which 

leads to the the repeated infections. You will follow up with a urologist 

outpatient to discuss management. Please follow with the urologist Dr. Heath within 1 week of discharge.





Norvasc was increased to 10 mg daily instead of 5 mg . prescription sent .


Levemir dose was increased . please check sugar three times a day before meals 

and report to MD





You should follow up with Dr. Blackmon in one week, who is your infectious 

disease doctor. 





You should follow up with your primary care physician in one week.





You should resume your home medications as prescribed.





**If you experience worsening fevers, chills, chest pain, shortness of breath, 

nausea, vomiting , diarrhea, please return to the emergency department for 

evaluation.





 


Referrals: 


Reuben Blackmon MD [Staff Physician] - 


Precious Ziegler [Primary Care Provider] - 1 Week


Alexey Heath MD [Staff Physician] - 1 Week


Disposition: HOME





- Home Medications


Comprehensive Discharge Medication List: 


Ambulatory Orders





Metformin HCl 1,000 mg PO BIDAC 09/25/14 


Pravastatin Sodium 10 mg PO HS 03/28/18 


Donepezil HCl 10 mg PO HS 03/29/18 


Insulin Glargine,Hum.rec.anlog [Lantus Solostar] 30 unit SQ DAILY 03/29/18 


Pantoprazole Sodium [Protonix] 40 mg PO DAILY 03/29/18 


traZODone HCL [Desyrel -] 50 mg PO HS  tablet 05/04/18 


Aspirin [ASA -] 325 mg PO Q48H 06/29/18 


Zolpidem Tartrate [Ambien] 5 mg PO HS 06/29/18 


levETIRAcetam [Keppra -] 500 mg PO BID 06/29/18 


Metoprolol Succinate 50 mg PO 07/02/18 


Amlodipine Besylate [Norvasc -] 10 mg PO DAILY #30 tablet 07/03/18 


Vancomycin HCl 125 mg PO Q6H #28 capsule 07/06/18 








This patient is new to me today: No


Emergency Visit: No


Critical Care patient: No





- Discharge Referral


Referred to [facility] No

## 2018-07-06 NOTE — PN
Teaching Attending Note


Name of Resident: Precious Carpio





ATTENDING PHYSICIAN STATEMENT





I saw and evaluated the patient.


I reviewed the resident's note and discussed the case with the resident.


I agree with the resident's findings and plan as documented.








SUBJECTIVE:








OBJECTIVE:


 Vital Signs











Temperature  97.8 F   07/06/18 10:00


 


Pulse Rate  70   07/06/18 10:00


 


Respiratory Rate  20   07/06/18 10:00


 


Blood Pressure  138/78   07/06/18 10:00


 


O2 Sat by Pulse Oximetry (%)  98   07/06/18 09:00








 CBCD











WBC  5.3 K/mm3 (4.0-10.0)   07/01/18  07:30    


 


RBC  4.36 M/mm3 (3.60-5.2)   07/01/18  07:30    


 


Hgb  12.1 GM/dL (10.7-15.3)   07/01/18  07:30    


 


Hct  35.5 % (32.4-45.2)   07/01/18  07:30    


 


MCV  81.4 fl (80-96)   07/01/18  07:30    


 


MCHC  34.0 g/dl (32.0-36.0)   07/01/18  07:30    


 


RDW  14.6 % (11.6-15.6)   07/01/18  07:30    


 


Plt Count  160 K/MM3 (134-434)   07/01/18  07:30    


 


MPV  8.5 fl (7.5-11.1)   07/01/18  07:30    








 CMP











Sodium  142 mmol/L (136-145)   07/01/18  07:30    


 


Potassium  3.8 mmol/L (3.5-5.1)   07/01/18  07:30    


 


Chloride  108 mmol/L ()  H  07/01/18  07:30    


 


Carbon Dioxide  24 mmol/L (21-32)   07/01/18  07:30    


 


Anion Gap  10  (8-16)   07/01/18  07:30    


 


BUN  11 mg/dL (7-18)   07/01/18  07:30    


 


Creatinine  0.6 mg/dL (0.55-1.02)   07/01/18  07:30    


 


Creat Clearance w eGFR  > 60  (>60)   07/01/18  07:30    


 


Random Glucose  176 mg/dL ()  H  07/01/18  07:30    


 


Calcium  7.9 mg/dL (8.5-10.1)  L  07/01/18  07:30    


 


Total Bilirubin  0.3 mg/dL (0.2-1.0)   07/01/18  07:30    


 


AST  16 U/L (15-37)   07/01/18  07:30    


 


ALT  24 U/L (12-78)   07/01/18  07:30    


 


Alkaline Phosphatase  67 U/L ()   07/01/18  07:30    


 


Total Protein  6.3 g/dl (6.4-8.2)  L  07/01/18  07:30    


 


Albumin  2.8 g/dl (3.4-5.0)  L  07/01/18  07:30    








 CARDIAC ENZYMES











Creatine Kinase  62 IU/L ()   06/28/18  20:23    


 


Troponin I  < 0.02 ng/ml (0.00-0.05)   06/28/18  20:23    








 Home Medications











 Medication  Instructions  Recorded


 


Metformin HCl 1,000 mg PO BIDAC 09/25/14


 


Pravastatin Sodium 10 mg PO HS 03/28/18


 


Donepezil HCl 10 mg PO HS 03/29/18


 


Insulin Glargine,Hum.rec.anlog 30 unit SQ DAILY 03/29/18





[Lantus Solostar]  


 


Pantoprazole Sodium [Protonix] 40 mg PO DAILY 03/29/18


 


traZODone HCL [Desyrel -] 50 mg PO HS  tablet 05/04/18


 


Aspirin [ASA -] 325 mg PO Q48H 06/29/18


 


Zolpidem Tartrate [Ambien] 5 mg PO HS 06/29/18


 


levETIRAcetam [Keppra -] 500 mg PO BID 06/29/18


 


Metoprolol Succinate 50 mg PO 07/02/18


 


Amlodipine Besylate [Norvasc -] 10 mg PO DAILY #30 tablet 07/03/18


 


Vancomycin HCl 125 mg PO Q6H #28 capsule 07/06/18














ASSESSMENT AND PLAN:


Patient is a 67 y/o lady with hx of  dementia , non verbal, treated Non Hodgkin'

s lymphhoma , DM , HTN, recent hospitalization for ESBL klebsiella UTI, recent 

seizures who presented with fever . 





# S/p Sepsis due to UTI: cx with Klebsiella ESBL. on ertapenam day 10/10  ,  no 

mechanical obstruction , and no intervention 


- f/u with ID and ,urology as out pt. cOMPLETED THE TREATMENT. Will follow with 

urologist and id  as an outpatient.





# DM: cont SSI and  levemir 





# H/o recent seizures. Cont keppra. 





# HTN: cont BB and increased norvasc





DVT Px: heparin

## 2018-10-04 ENCOUNTER — HOSPITAL ENCOUNTER (INPATIENT)
Dept: HOSPITAL 74 - JER | Age: 68
LOS: 7 days | Discharge: HOME | DRG: 872 | End: 2018-10-11
Attending: SPECIALIST | Admitting: INTERNAL MEDICINE
Payer: COMMERCIAL

## 2018-10-04 VITALS — BODY MASS INDEX: 22.6 KG/M2

## 2018-10-04 DIAGNOSIS — E11.9: ICD-10-CM

## 2018-10-04 DIAGNOSIS — Z79.4: ICD-10-CM

## 2018-10-04 DIAGNOSIS — B96.1: ICD-10-CM

## 2018-10-04 DIAGNOSIS — Z79.84: ICD-10-CM

## 2018-10-04 DIAGNOSIS — Z85.72: ICD-10-CM

## 2018-10-04 DIAGNOSIS — E87.6: ICD-10-CM

## 2018-10-04 DIAGNOSIS — F03.90: ICD-10-CM

## 2018-10-04 DIAGNOSIS — A41.9: Primary | ICD-10-CM

## 2018-10-04 DIAGNOSIS — I10: ICD-10-CM

## 2018-10-04 DIAGNOSIS — E78.5: ICD-10-CM

## 2018-10-04 DIAGNOSIS — N39.0: ICD-10-CM

## 2018-10-04 DIAGNOSIS — E87.2: ICD-10-CM

## 2018-10-04 DIAGNOSIS — Z87.891: ICD-10-CM

## 2018-10-04 DIAGNOSIS — A04.72: ICD-10-CM

## 2018-10-04 LAB
ALBUMIN SERPL-MCNC: 3.4 G/DL (ref 3.4–5)
ALP SERPL-CCNC: 86 U/L (ref 45–117)
ALT SERPL-CCNC: 21 U/L (ref 13–61)
ANION GAP SERPL CALC-SCNC: 8 MMOL/L (ref 8–16)
APPEARANCE UR: CLEAR
AST SERPL-CCNC: 9 U/L (ref 15–37)
BASOPHILS # BLD: 0.4 % (ref 0–2)
BILIRUB SERPL-MCNC: 0.5 MG/DL (ref 0.2–1)
BILIRUB UR STRIP.AUTO-MCNC: NEGATIVE MG/DL
BUN SERPL-MCNC: 13 MG/DL (ref 7–18)
CALCIUM SERPL-MCNC: 9.4 MG/DL (ref 8.5–10.1)
CHLORIDE SERPL-SCNC: 104 MMOL/L (ref 98–107)
CO2 SERPL-SCNC: 28 MMOL/L (ref 21–32)
COLOR UR: (no result)
CREAT SERPL-MCNC: 0.8 MG/DL (ref 0.55–1.3)
DEPRECATED RDW RBC AUTO: 14.6 % (ref 11.6–15.6)
EOSINOPHIL # BLD: 0 % (ref 0–4.5)
EPITH CASTS URNS QL MICRO: (no result) /HPF
GLUCOSE SERPL-MCNC: 166 MG/DL (ref 74–106)
HCT VFR BLD CALC: 36.2 % (ref 32.4–45.2)
HGB BLD-MCNC: 12.1 GM/DL (ref 10.7–15.3)
INR BLD: 1.03 (ref 0.83–1.09)
KETONES UR QL STRIP: NEGATIVE
LEUKOCYTE ESTERASE UR QL STRIP.AUTO: (no result)
LYMPHOCYTES # BLD: 7.6 % (ref 8–40)
MAGNESIUM SERPL-MCNC: 1.7 MG/DL (ref 1.8–2.4)
MCH RBC QN AUTO: 27.1 PG (ref 25.7–33.7)
MCHC RBC AUTO-ENTMCNC: 33.3 G/DL (ref 32–36)
MCV RBC: 81.4 FL (ref 80–96)
MONOCYTES # BLD AUTO: 4.9 % (ref 3.8–10.2)
MUCOUS THREADS URNS QL MICRO: (no result)
NEUTROPHILS # BLD: 87.1 % (ref 42.8–82.8)
NITRITE UR QL STRIP: POSITIVE
PH BLDV: 7.46 [PH] (ref 7.32–7.42)
PH UR: 7 [PH] (ref 5–8)
PLATELET # BLD AUTO: 243 K/MM3 (ref 134–434)
PMV BLD: 8.2 FL (ref 7.5–11.1)
POTASSIUM SERPLBLD-SCNC: 4 MMOL/L (ref 3.5–5.1)
PROT SERPL-MCNC: 7.4 G/DL (ref 6.4–8.2)
PROT UR QL STRIP: (no result)
PROT UR QL STRIP: NEGATIVE
PT PNL PPP: 12.2 SEC (ref 9.7–13)
RBC # BLD AUTO: 4.45 M/MM3 (ref 3.6–5.2)
SODIUM SERPL-SCNC: 140 MMOL/L (ref 136–145)
SP GR UR: 1.02 (ref 1–1.03)
UROBILINOGEN UR STRIP-MCNC: NEGATIVE MG/DL (ref 0.2–1)
VENOUS PC02: 39.5 MMHG (ref 38–52)
VENOUS PO2: 36.9 MMHG (ref 28–48)
WBC # BLD AUTO: 12.8 K/MM3 (ref 4–10)

## 2018-10-04 RX ADMIN — DEXTROSE AND SODIUM CHLORIDE SCH MLS/HR: 5; 900 INJECTION, SOLUTION INTRAVENOUS at 23:53

## 2018-10-04 RX ADMIN — ASPIRIN 325 MG ORAL TABLET SCH MG: 325 PILL ORAL at 23:42

## 2018-10-04 NOTE — PDOC
History of Present Illness





- General


History Source: Family


Exam Limitations: Clinical Condition





- History of Present Illness


Initial Comments: 





10/04/18 20:23


The patient is a 68 year old female, with a significant past medical history of 

nonverbal (baseline), diabetes, htn, dementia, chronic C.Diff, and lymphoma who 

presents to the emergency department with family after reportedly observing a 

sudden onset of weakness, chills and fever around 5PM this evening. The family 

reports noticing the patient shaking slightly leaving the doctors office today 

which she attributed to low blood sugar and states she gave the patient juice. 

The family at bedside states they witnessed the patient become weak and sweaty 

at home and took a rectal temperature which was 102F.  As per family, Dr. Younger sent her urine 3 days ago for culture which was positive for Klebsiella 

today. She has been taking Abx since May 2018.  She completed a 7 day course of 

levaquin PO 2 weeks ago. As per family, the patients urine is foul smelling 

after they sat her on the toilet to make urine. 





Allergies: Penicillins


PCP - Dr. Blackmon








<Shante Diaz - Last Filed: 10/04/18 22:39>





- General


Exam Limitations: Clinical Condition (nonverbal at baseline)





<Bob Osorio - Last Filed: 10/05/18 00:30>





- General


Stated Complaint: NEAR SYNCOPE


Time Seen by Provider: 10/04/18 19:32





Past History





<Shante Diaz - Last Filed: 10/04/18 22:39>





- Past Medical History


Cancer: Yes (NON-HODGKINS)


Cardiac Disorders: Yes (CAD,)


Dementia: Yes (severe dementia (non-verbal at baseline))


Diabetes: Yes


HTN: Yes


Hypercholesterolemia: Yes


Psychiatric Problems: Yes (dementia)


Seizures: Yes





- Surgical History


Abdominal Surgery: Yes (Kidney stent)





- Immunization History


Immunization Up to Date: Yes





- Suicide/Smoking/Psychosocial Hx


Smoking History: Never smoked


Have you smoked in the past 12 months: No


Number of Cigarettes Smoked Daily: 0


If you are a former smoker, when did you quit?: 6 months ago


'Breaking Loose' booklet given: 03/29/18


Hx Alcohol Use: No


Drug/Substance Use Hx: No


Substance Use Type: None





<Bob Osorio - Last Filed: 10/05/18 00:30>





- Past Medical History


Allergies/Adverse Reactions: 


 Allergies











Allergy/AdvReac Type Severity Reaction Status Date / Time


 


Penicillins Allergy   Verified 10/04/18 23:13











Home Medications: 


Ambulatory Orders





Metformin HCl 1,000 mg PO BIDAC 09/25/14 


Pravastatin Sodium 10 mg PO HS 03/28/18 


Donepezil HCl 10 mg PO HS 03/29/18 


Insulin Glargine,Hum.rec.anlog [Lantus Solostar] 30 unit SQ DAILY 03/29/18 


Pantoprazole Sodium [Protonix] 40 mg PO DAILY 03/29/18 


traZODone HCL [Desyrel -] 50 mg PO HS  tablet 05/04/18 


Aspirin [ASA -] 325 mg PO Q48H 06/29/18 


Zolpidem Tartrate [Ambien] 5 mg PO HS 06/29/18 


levETIRAcetam [Keppra -] 500 mg PO BID 06/29/18 


Metoprolol Succinate 50 mg PO 07/02/18 


Amlodipine Besylate [Norvasc -] 10 mg PO DAILY #30 tablet 07/03/18 


Vancomycin HCl 125 mg PO Q6H #28 capsule 07/06/18 











**Review of Systems





- Review of Systems


Constitutional: Yes: Chills, Fever


Respiratory: No: Cough, Shortness of Breath


Cardiac (ROS): Yes: Lightheadedness.  No: Chest Pain, Edema, Syncope


ABD/GI: No: Diarrhea, Nausea, Vomiting


: No: Dysuria, Frequency, Hematuria


All Other Systems: Reviewed and Negative





<Bob Osorio - Last Filed: 10/05/18 00:30>





*Physical Exam





- Physical Exam


Comments: 


10/04/18 20:23


GENERAL: The patient is +Febrile (103F), awake, alert, nonverbal at baseline, 

in no acute distress.


HEAD: Normal with no signs of trauma.


EYES: Pupils equal, round and reactive to light, extraocular movements intact, 

sclera anicteric, conjunctiva clear with no pallor.


ENT: Ears normal, nares patent, oropharynx clear without exudates.  Moist 

mucous membranes.


NECK: Normal range of motion, supple without lymphadenopathy, JVD, or masses.


LUNGS: Breath sounds equal, clear to auscultation bilaterally.  No wheeze/

crackles.


HEART: +Slight tachycardia. Regular rhythm. normal S1 and S2 without murmur or 

rub.


ABDOMEN: Soft/nontender/nondistended. BS wnl.  No guarding or rebound.  No 

palpable masses. No hepatosplenomegaly.


EXTREMITIES: Normal range of motion, no edema.  No clubbing or cyanosis. No 

cords, erythema, or tenderness.


NEUROLOGICAL: Cranial nerves II through XII grossly intact.  Nonverbal at 

baseline. Gait not assessed. 


SKIN: Warm, Dry, normal turgor, no rashes or lesions noted.








<Shante Diaz - Last Filed: 10/04/18 22:39>





**Heart Score/ECG Review


  ** #1


ECG reviewed & interpreted by me at: 23:34


General ECG Interpretation: Sinus Rhythm, Normal Rate (84), Normal Intervals (

qtc 484), No acute ischemic changes (TWI AVL, q waves V1V2, LAFB)


Compared to previous ECG there are: No significant change (1/18)





<Bob Osorio - Last Filed: 10/05/18 00:30>





ED Treatment Course





- LABORATORY


CBC & Chemistry Diagram: 


 10/04/18 20:50





 10/04/18 20:50





<Shante Diaz - Last Filed: 10/04/18 22:39>





- LABORATORY


CBC & Chemistry Diagram: 


 10/04/18 20:50





 10/04/18 20:50





- RADIOLOGY


Radiology Studies Ordered: 














 Category Date Time Status


 


 CHEST X-RAY PORTABLE* [RAD] [facility]  10/04/18 19:31 Ordered














<Bob Osorio - Last Filed: 10/05/18 00:30>





Medical Decision Making





- Medical Decision Making








10/04/18 22:39


Dr. Crabtree was paged at this time for admission of this patient. 





<Shante Diaz - Last Filed: 10/04/18 22:39>





- Medical Decision Making





10/04/18 20:18


A portion of this note was documented by scribe services under my direction. I 

have reviewed the details of the note, within reason, and agree with the 

documentation with the following case summary and management plan written by me.





68-year-old female with history of recurring UTI, hypertension, diabetes, 

chronic C. difficile on oral vancomycin presents after noted to have chills 

today with fever of 102 rectally at home, seen by Dr. Blackmon her PCP over the 

last few days, urine culture from 3 days ago showed positive Klebsiella. The 

patient was recently treated with a oral course of Levaquin for one week 2 

weeks ago, presents now secondary to fever in the setting of positive urine 

culture.





febrile 103 rectally, heart rate 95 on my examination


Alert, nonverbal at baseline, but no acute distress


Heart regular with slight tachycardia, lungs are clear


Abdomen is soft/nondistended/nontender, no CVA tenderness


No edema, moving all extremities equally





68-year-old female presents with sepsis, likely secondary to acute on chronic 

UTI. On review of cultures, the Klebsiella was sensitive to the penems and 

Levaquin in June.


Sepsis protocol initiated


EKG, chest x-ray


Tylenol, antibiotics


Admission





10/04/18 22:34


wbc 12.8 with left shift, chem wnl including negative trop. Lactate resulted at 

4.4, meeting criteria for septic shock. IVF resuscitation initiated, otherwise 

clinically unchanged. 


Proceed with admission, Dr. Crabtree covering Dr. Blackmon. 





10/04/18 22:58


Accepted for inpatient med/surg by Dr. Crabtree. Agrees with management. 

Receiving fluids, will repeat lactate. 





<Bob Osorio - Last Filed: 10/05/18 00:30>





*DC/Admit/Observation/Transfer





- Attestations


Scribe Attestion: 





10/04/18 20:23





Documentation prepared by Shante Diaz, acting as medical scribe for Bob Osorio MD





<Shante Diaz - Last Filed: 10/04/18 22:39>





- Discharge Dispostion


Decision to Admit order: Yes





<Bob Osorio - Last Filed: 10/05/18 00:30>


Diagnosis at time of Disposition: 


Sepsis


Qualifiers:


 Sepsis type: sepsis due to unspecified organism Qualified Code(s): A41.9 - 

Sepsis, unspecified organism








- Discharge Dispostion


Condition at time of disposition: Fair

## 2018-10-04 NOTE — HP
Admitting History and Physical





- Admission


History of Present Illness: 





The patient is a 68 year old female, with a significant past medical history of 

nonverbal (baseline), diabetes, htn, dementia, chronic C.Diff, and lymphoma who 

presents to the emergency department with family after reportedly observing a 

sudden onset of weakness, chills and fever around 5PM this evening. The family 

reports noticing the patient shaking slightly leaving the doctors office today 

which she attributed to low blood sugar and states she gave the patient juice. 

The family at bedside states they witnessed the patient become weak and sweaty 

at home and took a rectal temperature which was 102F.  As per family, Dr. Younger sent her urine 3 days ago for culture which was positive for Klebsiella 

today. She has been taking Abx since May 2018.  She completed a 7 day course of 

levaquin PO 2 weeks ago. As per family, the patients urine is foul smelling 

after they sat her on the toilet to make urine. 





History Source: Family Member, Medical Record


Limitations to Obtaining History: Other (non verbal)





- Past Medical History


CNS: Yes: Dementia


Cardiovascular: Yes: HTN, Hyperlipdemia


Renal/: Yes: UTI, Other (UPJ obstruction)


Heme/Onc: Yes: Other (Non-hodgkins lymphoma s/p chemo last 3/2014)





- Smoking History


Smoking history: Never smoked


Have you smoked in the past 12 months: No


Aproximately how many cigarettes per day: 0


If you are a former smoker, when did you quit?: 6 months ago





- Alcohol/Substance Use


Hx Alcohol Use: No


History of Substance Use: reports: None





- Social History


ADL: Independent


History of Recent Travel: No





Home Medications





- Allergies


Allergies/Adverse Reactions: 


 Allergies











Allergy/AdvReac Type Severity Reaction Status Date / Time


 


Penicillins Allergy   Verified 10/04/18 23:13














- Home Medications


Home Medications: 


Ambulatory Orders





Metformin HCl 1,000 mg PO BIDAC 09/25/14 


Pravastatin Sodium 10 mg PO HS 03/28/18 


Donepezil HCl 10 mg PO HS 03/29/18 


Insulin Glargine,Hum.rec.anlog [Lantus Solostar] 30 unit SQ DAILY 03/29/18 


Pantoprazole Sodium [Protonix] 40 mg PO DAILY 03/29/18 


traZODone HCL [Desyrel -] 50 mg PO HS  tablet 05/04/18 


Aspirin [ASA -] 325 mg PO Q48H 06/29/18 


Zolpidem Tartrate [Ambien] 5 mg PO HS 06/29/18 


levETIRAcetam [Keppra -] 500 mg PO BID 06/29/18 


Metoprolol Succinate 50 mg PO 07/02/18 


Amlodipine Besylate [Norvasc -] 10 mg PO DAILY #30 tablet 07/03/18 


Vancomycin HCl 125 mg PO Q6H #28 capsule 07/06/18 











Physical Examination


Vital Signs: 


 Vital Signs











Temperature  103.5 F H  10/04/18 22:00


 


Pulse Rate  86   10/04/18 22:00


 


Respiratory Rate  18   10/04/18 22:00


 


Blood Pressure  131/84   10/04/18 22:00


 


O2 Sat by Pulse Oximetry (%)  96   10/04/18 22:00











Labs: 


 CBC, BMP





 10/04/18 20:50 





 10/04/18 20:50

## 2018-10-04 NOTE — PDOC
History of Present Illness





- General


Stated Complaint: NEAR SYNCOPE


Time Seen by Provider: 10/04/18 19:32





- History of Present Illness


Initial Comments: 





10/04/18 19:32


69 yo F with h/o HTN, DM, Alzheimers Dementia








Patient denies N/V, F,C, CP, SOB, urinary complaints, abdominal pain, diarrhea, 

constipation, lightheadedness, weakness, sensory changes. 


PMHx: as noted above


ROS: as noted


SHx: 


Allergies: NKDA








Past History





- Past Medical History


Allergies/Adverse Reactions: 


 Allergies











Allergy/AdvReac Type Severity Reaction Status Date / Time


 


Penicillins Allergy   Verified 06/29/18 08:31











Home Medications: 


Ambulatory Orders





Metformin HCl 1,000 mg PO BIDAC 09/25/14 


Pravastatin Sodium 10 mg PO HS 03/28/18 


Donepezil HCl 10 mg PO HS 03/29/18 


Insulin Glargine,Hum.rec.anlog [Lantus Solostar] 30 unit SQ DAILY 03/29/18 


Pantoprazole Sodium [Protonix] 40 mg PO DAILY 03/29/18 


traZODone HCL [Desyrel -] 50 mg PO HS  tablet 05/04/18 


Aspirin [ASA -] 325 mg PO Q48H 06/29/18 


Zolpidem Tartrate [Ambien] 5 mg PO HS 06/29/18 


levETIRAcetam [Keppra -] 500 mg PO BID 06/29/18 


Metoprolol Succinate 50 mg PO 07/02/18 


Amlodipine Besylate [Norvasc -] 10 mg PO DAILY #30 tablet 07/03/18 


Vancomycin HCl 125 mg PO Q6H #28 capsule 07/06/18 








Cancer: Yes (NON-[facility])


Cardiac Disorders: Yes (CAD,)


Dementia: Yes (severe dementia (non-verbal at baseline))


Diabetes: Yes


HTN: Yes


Hypercholesterolemia: Yes


Psychiatric Problems: Yes (dementia)


Seizures: Yes





- Surgical History


Abdominal Surgery: Yes (Kidney stent)





- Immunization History


Immunization Up to Date: Yes





- Suicide/Smoking/Psychosocial Hx


Smoking History: Never smoked


Have you smoked in the past 12 months: No


Number of Cigarettes Smoked Daily: 0


If you are a former smoker, when did you quit?: 6 months ago


'Breaking Loose' booklet given: 03/29/18


Hx Alcohol Use: No


Drug/Substance Use Hx: No


Substance Use Type: None

## 2018-10-05 LAB
ANION GAP SERPL CALC-SCNC: 11 MMOL/L (ref 8–16)
BUN SERPL-MCNC: 9 MG/DL (ref 7–18)
CALCIUM SERPL-MCNC: 7.7 MG/DL (ref 8.5–10.1)
CHLORIDE SERPL-SCNC: 108 MMOL/L (ref 98–107)
CO2 SERPL-SCNC: 25 MMOL/L (ref 21–32)
CREAT SERPL-MCNC: 0.8 MG/DL (ref 0.55–1.3)
DEPRECATED RDW RBC AUTO: 14.4 % (ref 11.6–15.6)
GLUCOSE SERPL-MCNC: 279 MG/DL (ref 74–106)
HCT VFR BLD CALC: 31 % (ref 32.4–45.2)
HGB BLD-MCNC: 10.2 GM/DL (ref 10.7–15.3)
MCH RBC QN AUTO: 27 PG (ref 25.7–33.7)
MCHC RBC AUTO-ENTMCNC: 32.9 G/DL (ref 32–36)
MCV RBC: 81.9 FL (ref 80–96)
PLATELET # BLD AUTO: 170 K/MM3 (ref 134–434)
PMV BLD: 7.7 FL (ref 7.5–11.1)
POTASSIUM SERPLBLD-SCNC: 3.1 MMOL/L (ref 3.5–5.1)
RBC # BLD AUTO: 3.78 M/MM3 (ref 3.6–5.2)
SODIUM SERPL-SCNC: 145 MMOL/L (ref 136–145)
WBC # BLD AUTO: 9.1 K/MM3 (ref 4–10)

## 2018-10-05 RX ADMIN — DONEPEZIL HYDROCHLORIDE SCH MG: 10 TABLET, FILM COATED ORAL at 22:59

## 2018-10-05 RX ADMIN — DEXTROSE AND SODIUM CHLORIDE SCH MLS/HR: 5; 900 INJECTION, SOLUTION INTRAVENOUS at 20:50

## 2018-10-05 RX ADMIN — METFORMIN HYDROCHLORIDE SCH MG: 500 TABLET ORAL at 06:09

## 2018-10-05 RX ADMIN — LEVETIRACETAM SCH MG: 500 TABLET, FILM COATED ORAL at 22:58

## 2018-10-05 RX ADMIN — METFORMIN HYDROCHLORIDE SCH MG: 500 TABLET ORAL at 17:35

## 2018-10-05 RX ADMIN — TRAZODONE HYDROCHLORIDE SCH MG: 50 TABLET ORAL at 02:20

## 2018-10-05 RX ADMIN — INSULIN ASPART SCH UNITS: 100 INJECTION, SOLUTION INTRAVENOUS; SUBCUTANEOUS at 06:09

## 2018-10-05 RX ADMIN — ATORVASTATIN CALCIUM SCH MG: 10 TABLET, FILM COATED ORAL at 22:59

## 2018-10-05 RX ADMIN — TRAZODONE HYDROCHLORIDE SCH MG: 50 TABLET ORAL at 22:58

## 2018-10-05 RX ADMIN — PANTOPRAZOLE SODIUM SCH MG: 40 TABLET, DELAYED RELEASE ORAL at 10:01

## 2018-10-05 RX ADMIN — LEVETIRACETAM SCH MG: 500 TABLET, FILM COATED ORAL at 10:01

## 2018-10-05 RX ADMIN — INSULIN ASPART SCH UNITS: 100 INJECTION, SOLUTION INTRAVENOUS; SUBCUTANEOUS at 17:36

## 2018-10-05 RX ADMIN — DEXTROSE AND SODIUM CHLORIDE SCH MLS/HR: 5; 900 INJECTION, SOLUTION INTRAVENOUS at 10:01

## 2018-10-05 RX ADMIN — AMLODIPINE BESYLATE SCH MG: 10 TABLET ORAL at 10:00

## 2018-10-05 RX ADMIN — ZOLPIDEM TARTRATE PRN MG: 5 TABLET, COATED ORAL at 02:20

## 2018-10-05 NOTE — EKG
Test Reason : 

Blood Pressure : ***/*** mmHG

Vent. Rate : 084 BPM     Atrial Rate : 084 BPM

   P-R Int : 178 ms          QRS Dur : 098 ms

    QT Int : 410 ms       P-R-T Axes : 051 -48 079 degrees

   QTc Int : 484 ms

 

*** POOR DATA QUALITY, INTERPRETATION MAY BE ADVERSELY AFFECTED

NORMAL SINUS RHYTHM

INCOMPLETE RIGHT BUNDLE BRANCH BLOCK

LEFT ANTERIOR FASCICULAR BLOCK

MODERATE VOLTAGE CRITERIA FOR LVH, MAY BE NORMAL VARIANT

CANNOT RULE OUT SEPTAL INFARCT (CITED ON OR BEFORE 05-APR-2018)

ABNORMAL ECG

WHEN COMPARED WITH ECG OF 05-JUL-2018 10:13,

T WAVE INVERSION LESS EVIDENT IN LATERAL LEADS

Confirmed by TIP WAY MD ([id number]) on 10/5/2018 9:15:47 AM

 

Referred By:             Confirmed By:TIP WAY MD

## 2018-10-05 NOTE — CON.ID
Consult


Consult Specialty:: Infectious Diseases


Referred by:: Dr. Crabtree


Reason for Consultation:: Fever





- History of Present Illness


Chief Complaint: Fever


History of Present Illness: 





Mrs. Trevizo is a 68 year old female, with a significant past medical history of 

Dementia, DM, HTN, CDI. She was recently Rx with PO Levaquin a few weeks ago ( C

/s grew Klebsiella). Her daughter had submitted a urine sample last Monday, 

which again was (+) for UTI with significant pyuria and cultures again (+) 

Klebsiella. She had seen her PMD yesterday and when she got home developed 

sudden onset fever, rigors and sweats. Her daughter had called me and I advised 

in=patient admission. In the ED, she received IV Levaquin and VAnco and was 

started on IV Fluids.





- History Source


History Provided By: Family Member





- Past Medical History


CNS: Yes: Dementia


Cardio/Vascular: Yes: HTN, Hyperlipdemia


Renal/: Yes: UTI, Other (UPJ obstruction)





- Alcohol/Substance Use


Hx Alcohol Use: No


History of Substance Use: reports: None





- Smoking History


Smoking history: Former smoker


Have you smoked in the past 12 months: No


Aproximately how many cigarettes per day: 0


If you are a former smoker, when did you quit?: 6 months ago





- Social History


Usual Living Arrangement: With Child


ADL: Independent


History of Recent Travel: No





Home Medications





- Allergies


Allergies/Adverse Reactions: 


 Allergies











Allergy/AdvReac Type Severity Reaction Status Date / Time


 


Penicillins Allergy   Verified 10/04/18 23:13














- Home Medications


Home Medications: 


Ambulatory Orders





Metformin HCl 1,000 mg PO BIDAC 09/25/14 


Pravastatin Sodium 10 mg PO HS 03/28/18 


Donepezil HCl 10 mg PO HS 03/29/18 


Insulin Glargine,Hum.rec.anlog [Lantus Solostar] 30 unit SQ DAILY 03/29/18 


Pantoprazole Sodium [Protonix] 40 mg PO DAILY 03/29/18 


traZODone HCL [Desyrel -] 50 mg PO HS  tablet 05/04/18 


Aspirin [ASA -] 325 mg PO Q48H 06/29/18 


Zolpidem Tartrate [Ambien] 5 mg PO HS 06/29/18 


levETIRAcetam [Keppra -] 500 mg PO BID 06/29/18 


Metoprolol Succinate 50 mg PO 07/02/18 


Amlodipine Besylate [Norvasc -] 10 mg PO DAILY #30 tablet 07/03/18 


Vancomycin HCl 125 mg PO Q6H #28 capsule 07/06/18 











Family Disease History





- Family Disease History


Family History: Unable to Obtain





Review of Systems





- Review of Systems


Constitutional: reports: Chills, Fever





Physical Exam


Vital Signs: 


 Vital Signs











Temperature  100.9 F H  10/05/18 07:01


 


Pulse Rate  89   10/05/18 07:01


 


Respiratory Rate  20   10/05/18 07:01


 


Blood Pressure  128/69   10/05/18 07:01


 


O2 Sat by Pulse Oximetry (%)  97   10/05/18 02:30











Constitutional: Yes: Well Nourished


Neck: Yes: Supple


Cardiovascular: Yes: Regular Rate and Rhythm


Respiratory: Yes: CTA Bilaterally


Gastrointestinal: Yes: Normal Bowel Sounds, Soft


Edema: No


Labs: 


 CBC, BMP





 10/05/18 07:45 





 10/05/18 07:45 











Imaging





- Results


Chest X-ray: Report Reviewed





Problem List





- Problems


(1) Sepsis


Code(s): A41.9 - SEPSIS, UNSPECIFIED ORGANISM   


Qualifiers: 


   Sepsis type: sepsis due to unspecified organism   Qualified Code(s): A41.9 - 

Sepsis, unspecified organism   





(2) C. difficile diarrhea


Code(s): A04.72 - ENTEROCOLITIS D/T CLOSTRIDIUM DIFFICILE, NOT SPCF AS RECUR   





Assessment/Plan


Fever and UTI in a 68 yr old female with dementia


Sepsis Syndrome





Await final c/s 


Will likely be Klebsiella again


Can give [id number] mg IV Q 24.

## 2018-10-05 NOTE — PN
Progress Note (short form)





- Note


Progress Note: 





67 y/o female found lying in bed, asleep. Family present. Pt nonverbal with 

Dementia. Admitted for weakness/ fever.





 Vital Signs











 Period  Temp  Pulse  Resp  BP Sys/Silver  Pulse Ox


 


 Last 24 Hr  97.8 F-103.5 F  81-95  18-22  128-173/64-84  








 CBC, BMP





 10/05/18 07:45 





 10/05/18 07:45 





HEENT- NL


Neck- Supple


Lungs- CTAB


Heart- S1/S2


Abd- Soft, NT


Ext- No LE edema





Active Medications





Acetaminophen (Tylenol -)  650 mg PO Q4H PRN


   PRN Reason: FEVER


   Last Admin: 10/05/18 06:08 Dose:  650 mg


Amlodipine Besylate (Norvasc -)  10 mg PO DAILY [facility]


   Last Admin: 10/05/18 10:00 Dose:  10 mg


Aspirin (Asa -)  325 mg PO Q48H [facility]


   Last Admin: 10/04/18 23:42 Dose:  325 mg


Atorvastatin Calcium (Lipitor -)  10 mg PO HS [facility]


Donepezil HCl (Aricept -)  10 mg PO HS [facility]


Dextrose/Sodium Chloride (D5-Ns -)  1,000 mls @ 100 mls/hr IV ASDIR [facility]


   Last Admin: 10/05/18 10:01 Dose:  100 mls/hr


Levofloxacin (Levaquin 500 Mg Premixed Ivpb -)  500 mg in 100 mls @ 100 mls/hr 

IVPB DAILY [facility]; Protocol


   Stop: 10/05/18 11:59


Dextrose/Sodium Chloride (D5-Ns -)  1,000 mls @ 100 mls/hr IV ASDIR [facility]


   Stop: 10/05/18 23:59


Insulin Aspart (Novolog Vial Sliding Scale -)  1 vial SQ BIDAC [facility]; Protocol


   Last Admin: 10/05/18 06:09 Dose:  4 units


Levetiracetam (Keppra -)  500 mg PO BID [facility]


   Last Admin: 10/05/18 10:01 Dose:  500 mg


Metformin HCl (Glucophage -)  1,000 mg PO BIDAC [facility]


   Last Admin: 10/05/18 06:09 Dose:  1,000 mg


Metoprolol Succinate (Toprol Xl -)  50 mg PO DAILY [facility]


   Last Admin: 10/05/18 10:01 Dose:  50 mg


Pantoprazole Sodium (Protonix -)  40 mg PO DAILY [facility]


   Last Admin: 10/05/18 10:01 Dose:  40 mg


Trazodone HCl (Desyrel -)  50 mg PO HS [facility]


   Last Admin: 10/05/18 02:20 Dose:  50 mg


Zolpidem Tartrate (Ambien -)  5 mg PO HS PRN


   PRN Reason: INSOMNIA


   Last Admin: 10/05/18 02:20 Dose:  5 mg





#Febrile


Cultures pending


Temp normal now


ID consult appreciated


IV antibiotics per ID





#Elevated Lactic acid- 3.1


IV fluid increased to 200 cc for 5 hrs


Repeat level





#Dementia


At Baseline


Continue Aricept





#HTN


BP stable


Cont Amlodipine





#IDDM


Novolog sliding scale


Metformin BID

## 2018-10-06 LAB
ALBUMIN SERPL-MCNC: 2.5 G/DL (ref 3.4–5)
ALP SERPL-CCNC: 65 U/L (ref 45–117)
ALT SERPL-CCNC: 15 U/L (ref 13–61)
ANION GAP SERPL CALC-SCNC: 9 MMOL/L (ref 8–16)
AST SERPL-CCNC: 9 U/L (ref 15–37)
BASOPHILS # BLD: 0.6 % (ref 0–2)
BILIRUB SERPL-MCNC: 0.6 MG/DL (ref 0.2–1)
BUN SERPL-MCNC: 5 MG/DL (ref 7–18)
CALCIUM SERPL-MCNC: 7.4 MG/DL (ref 8.5–10.1)
CHLORIDE SERPL-SCNC: 103 MMOL/L (ref 98–107)
CO2 SERPL-SCNC: 27 MMOL/L (ref 21–32)
CREAT SERPL-MCNC: 0.6 MG/DL (ref 0.55–1.3)
DEPRECATED RDW RBC AUTO: 14.3 % (ref 11.6–15.6)
EOSINOPHIL # BLD: 0.7 % (ref 0–4.5)
GLUCOSE SERPL-MCNC: 216 MG/DL (ref 74–106)
HCT VFR BLD CALC: 30.5 % (ref 32.4–45.2)
HGB BLD-MCNC: 10.2 GM/DL (ref 10.7–15.3)
LYMPHOCYTES # BLD: 18.4 % (ref 8–40)
MCH RBC QN AUTO: 27 PG (ref 25.7–33.7)
MCHC RBC AUTO-ENTMCNC: 33.6 G/DL (ref 32–36)
MCV RBC: 80.5 FL (ref 80–96)
MONOCYTES # BLD AUTO: 8.5 % (ref 3.8–10.2)
NEUTROPHILS # BLD: 71.8 % (ref 42.8–82.8)
PLATELET # BLD AUTO: 171 K/MM3 (ref 134–434)
PMV BLD: 8.4 FL (ref 7.5–11.1)
POTASSIUM SERPLBLD-SCNC: 3 MMOL/L (ref 3.5–5.1)
PROT SERPL-MCNC: 5.8 G/DL (ref 6.4–8.2)
RBC # BLD AUTO: 3.79 M/MM3 (ref 3.6–5.2)
SODIUM SERPL-SCNC: 140 MMOL/L (ref 136–145)
WBC # BLD AUTO: 6.3 K/MM3 (ref 4–10)

## 2018-10-06 RX ADMIN — VANCOMYCIN HYDROCHLORIDE SCH MG: 250 CAPSULE ORAL at 23:30

## 2018-10-06 RX ADMIN — DEXTROSE AND SODIUM CHLORIDE SCH: 5; 900 INJECTION, SOLUTION INTRAVENOUS at 23:29

## 2018-10-06 RX ADMIN — DEXTROSE AND SODIUM CHLORIDE SCH MLS/HR: 5; 900 INJECTION, SOLUTION INTRAVENOUS at 23:09

## 2018-10-06 RX ADMIN — TRAZODONE HYDROCHLORIDE SCH MG: 50 TABLET ORAL at 22:00

## 2018-10-06 RX ADMIN — ASPIRIN 325 MG ORAL TABLET SCH MG: 325 PILL ORAL at 23:02

## 2018-10-06 RX ADMIN — ZOLPIDEM TARTRATE PRN MG: 5 TABLET, COATED ORAL at 22:00

## 2018-10-06 RX ADMIN — METFORMIN HYDROCHLORIDE SCH MG: 500 TABLET ORAL at 16:38

## 2018-10-06 RX ADMIN — INSULIN ASPART SCH UNITS: 100 INJECTION, SOLUTION INTRAVENOUS; SUBCUTANEOUS at 06:37

## 2018-10-06 RX ADMIN — VANCOMYCIN HYDROCHLORIDE SCH MG: 250 CAPSULE ORAL at 18:38

## 2018-10-06 RX ADMIN — DEXTROSE AND SODIUM CHLORIDE SCH MLS/HR: 5; 900 INJECTION, SOLUTION INTRAVENOUS at 06:28

## 2018-10-06 RX ADMIN — PANTOPRAZOLE SODIUM SCH MG: 40 TABLET, DELAYED RELEASE ORAL at 11:24

## 2018-10-06 RX ADMIN — AMLODIPINE BESYLATE SCH MG: 10 TABLET ORAL at 11:24

## 2018-10-06 RX ADMIN — METFORMIN HYDROCHLORIDE SCH MG: 500 TABLET ORAL at 06:27

## 2018-10-06 RX ADMIN — ATORVASTATIN CALCIUM SCH MG: 10 TABLET, FILM COATED ORAL at 21:59

## 2018-10-06 RX ADMIN — INSULIN ASPART SCH UNITS: 100 INJECTION, SOLUTION INTRAVENOUS; SUBCUTANEOUS at 16:38

## 2018-10-06 RX ADMIN — LEVETIRACETAM SCH MG: 500 TABLET, FILM COATED ORAL at 11:24

## 2018-10-06 RX ADMIN — DONEPEZIL HYDROCHLORIDE SCH MG: 10 TABLET, FILM COATED ORAL at 22:00

## 2018-10-06 RX ADMIN — LEVETIRACETAM SCH MG: 500 TABLET, FILM COATED ORAL at 21:59

## 2018-10-06 NOTE — PN
Progress Note, Physician


History of Present Illness: 





Mrs. Trevizo is a 68 year old female, with a significant past medical history of 

Dementia, DM, HTN, CDI. She was recently Rx with PO Levaquin a few weeks ago ( C

/s grew Klebsiella). Her daughter had submitted a urine sample last Monday, 

which again was (+) for UTI with significant pyuria and cultures again (+) 

Klebsiella. She had seen her PMD yesterday and when she got home developed 

sudden onset fever, rigors and sweats. Her daughter had called me and I advised 

inpatient admission. In the ED, she received IV Levaquin and VAnco and was 

started on IV Fluids.





- Current Medication List


Current Medications: 


Active Medications





Acetaminophen (Tylenol -)  650 mg PO Q4H PRN


   PRN Reason: FEVER


   Last Admin: 10/05/18 06:08 Dose:  650 mg


Amlodipine Besylate (Norvasc -)  10 mg PO DAILY [facility]


   Last Admin: 10/06/18 11:24 Dose:  10 mg


Aspirin (Asa -)  325 mg PO Q48H [facility]


   Last Admin: 10/04/18 23:42 Dose:  325 mg


Atorvastatin Calcium (Lipitor -)  10 mg PO HS [facility]


   Last Admin: 10/05/18 22:59 Dose:  10 mg


Donepezil HCl (Aricept -)  10 mg PO HS [facility]


   Last Admin: 10/05/18 22:59 Dose:  10 mg


Dextrose/Sodium Chloride (D5-Ns -)  1,000 mls @ 100 mls/hr IV ASDIR [facility]


   Last Admin: 10/06/18 06:28 Dose:  100 mls/hr


Insulin Aspart (Novolog Vial Sliding Scale -)  1 vial SQ BIDAC [facility]; Protocol


   Last Admin: 10/06/18 16:38 Dose:  6 units


Levetiracetam (Keppra -)  500 mg PO BID [facility]


   Last Admin: 10/06/18 11:24 Dose:  500 mg


Metformin HCl (Glucophage -)  1,000 mg PO BIDAC [facility]


   Last Admin: 10/06/18 16:38 Dose:  1,000 mg


Metoprolol Succinate (Toprol Xl -)  50 mg PO DAILY [facility]


   Last Admin: 10/06/18 11:24 Dose:  50 mg


Pantoprazole Sodium (Protonix -)  40 mg PO DAILY [facility]


   Last Admin: 10/06/18 11:24 Dose:  40 mg


Trazodone HCl (Desyrel -)  50 mg PO HS [facility]


   Last Admin: 10/05/18 22:58 Dose:  50 mg


Zolpidem Tartrate (Ambien -)  5 mg PO HS PRN


   PRN Reason: INSOMNIA


   Last Admin: 10/05/18 02:20 Dose:  5 mg











- Objective


Vital Signs: 


 Vital Signs











Temperature  97.9 F   10/06/18 14:00


 


Pulse Rate  79   10/06/18 14:00


 


Respiratory Rate  18   10/06/18 14:00


 


Blood Pressure  128/67   10/06/18 14:00


 


O2 Sat by Pulse Oximetry (%)  97   10/05/18 02:30











Constitutional: Yes: Well Nourished


Cardiovascular: Yes: Regular Rate and Rhythm


Respiratory: Yes: CTA Bilaterally


Gastrointestinal: Yes: Normal Bowel Sounds, Soft


Labs: 


 CBC, BMP





 10/06/18 07:15 





 10/06/18 07:15 





 INR, PTT











INR  1.03  (0.83-1.09)   10/04/18  20:50    














Problem List





- Problems


(1) Sepsis


Code(s): A41.9 - SEPSIS, UNSPECIFIED ORGANISM   


Qualifiers: 


   Sepsis type: sepsis due to unspecified organism   Qualified Code(s): A41.9 - 

Sepsis, unspecified organism   





(2) C. difficile diarrhea


Code(s): A04.72 - ENTEROCOLITIS D/T CLOSTRIDIUM DIFFICILE, NOT SPCF AS RECUR   





Assessment/Plan


Fever and UTI in a 68 yr old female with dementia


Sepsis Syndrome





Continue Levaquin 500 mg Q 24.


Can switch to PO in AM


She has been on Rx PO VAnco 125 mg q 6.

## 2018-10-06 NOTE — PN
Progress Note, Physician


History of Present Illness: 





No new complaints





- Current Medication List


Current Medications: 


Active Medications





Acetaminophen (Tylenol -)  650 mg PO Q4H PRN


   PRN Reason: FEVER


   Last Admin: 10/05/18 06:08 Dose:  650 mg


Amlodipine Besylate (Norvasc -)  10 mg PO DAILY [facility]


   Last Admin: 10/06/18 11:24 Dose:  10 mg


Aspirin (Asa -)  325 mg PO Q48H [facility]


   Last Admin: 10/06/18 23:02 Dose:  325 mg


Atorvastatin Calcium (Lipitor -)  10 mg PO HS [facility]


   Last Admin: 10/06/18 21:59 Dose:  10 mg


Donepezil HCl (Aricept -)  10 mg PO HS [facility]


   Last Admin: 10/06/18 22:00 Dose:  10 mg


Dextrose/Sodium Chloride (D5-Ns -)  1,000 mls @ 100 mls/hr IV ASDIR [facility]


   Last Admin: 10/06/18 23:09 Dose:  100 mls/hr


Insulin Aspart (Novolog Vial Sliding Scale -)  1 vial SQ BIDAC [facility]; Protocol


   Last Admin: 10/06/18 16:38 Dose:  6 units


Levetiracetam (Keppra -)  500 mg PO BID [facility]


   Last Admin: 10/06/18 21:59 Dose:  500 mg


Metformin HCl (Glucophage -)  1,000 mg PO BIDAC [facility]


   Last Admin: 10/06/18 16:38 Dose:  1,000 mg


Metoprolol Succinate (Toprol Xl -)  50 mg PO DAILY [facility]


   Last Admin: 10/06/18 11:24 Dose:  50 mg


Pantoprazole Sodium (Protonix -)  40 mg PO DAILY [facility]


   Last Admin: 10/06/18 11:24 Dose:  40 mg


Trazodone HCl (Desyrel -)  50 mg PO HS [facility]


   Last Admin: 10/06/18 22:00 Dose:  50 mg


Vancomycin HCl (Vancomycin Oral Solution)  125 mg PO Q6HPO [facility]


   Last Admin: 10/06/18 18:38 Dose:  125 mg


Zolpidem Tartrate (Ambien -)  5 mg PO HS PRN


   PRN Reason: INSOMNIA


   Last Admin: 10/06/18 22:00 Dose:  5 mg











- Objective


Vital Signs: 


 Vital Signs











Temperature  97.9 F   10/06/18 14:00


 


Pulse Rate  79   10/06/18 14:00


 


Respiratory Rate  18   10/06/18 14:00


 


Blood Pressure  128/67   10/06/18 14:00


 


O2 Sat by Pulse Oximetry (%)  97   10/05/18 02:30











Neck: Yes: WNL, Supple


Cardiovascular: Yes: WNL, Regular Rate and Rhythm


Respiratory: Yes: WNL, Regular, CTA Bilaterally


Gastrointestinal: Yes: WNL, Normal Bowel Sounds, Soft


Labs: 


 CBC, BMP





 10/06/18 07:15 





 10/06/18 07:15 





 INR, PTT











INR  1.03  (0.83-1.09)   10/04/18  20:50    














Problem List





- Problems


(1) Sepsis


Assessment/Plan: 


Repeat lactic acid level in am


Cont IV antibxs


Code(s): A41.9 - SEPSIS, UNSPECIFIED ORGANISM   


Qualifiers: 


   Sepsis type: sepsis due to unspecified organism   Qualified Code(s): A41.9 - 

Sepsis, unspecified organism   





(2) Alzheimer disease


Code(s): G30.9 - ALZHEIMER'S DISEASE, UNSPECIFIED; F02.80 - DEMENTIA IN OTH 

DISEASES CLASSD ELSWHR W/O BEHAVRL DISTURB   





(3) Diabetes mellitus


Code(s): E11.9 - TYPE 2 DIABETES MELLITUS WITHOUT COMPLICATIONS   





(4) HTN (hypertension)


Code(s): I10 - ESSENTIAL (PRIMARY) HYPERTENSION

## 2018-10-07 LAB
ANION GAP SERPL CALC-SCNC: 10 MMOL/L (ref 8–16)
BUN SERPL-MCNC: 9 MG/DL (ref 7–18)
CALCIUM SERPL-MCNC: 8 MG/DL (ref 8.5–10.1)
CHLORIDE SERPL-SCNC: 102 MMOL/L (ref 98–107)
CO2 SERPL-SCNC: 27 MMOL/L (ref 21–32)
CREAT SERPL-MCNC: 0.7 MG/DL (ref 0.55–1.3)
DEPRECATED RDW RBC AUTO: 14.3 % (ref 11.6–15.6)
GLUCOSE SERPL-MCNC: 255 MG/DL (ref 74–106)
HCT VFR BLD CALC: 34.3 % (ref 32.4–45.2)
HGB BLD-MCNC: 11.4 GM/DL (ref 10.7–15.3)
MCH RBC QN AUTO: 26.7 PG (ref 25.7–33.7)
MCHC RBC AUTO-ENTMCNC: 33.4 G/DL (ref 32–36)
MCV RBC: 80 FL (ref 80–96)
PLATELET # BLD AUTO: 193 K/MM3 (ref 134–434)
PMV BLD: 8 FL (ref 7.5–11.1)
POTASSIUM SERPLBLD-SCNC: 3.2 MMOL/L (ref 3.5–5.1)
RBC # BLD AUTO: 4.28 M/MM3 (ref 3.6–5.2)
SODIUM SERPL-SCNC: 139 MMOL/L (ref 136–145)
WBC # BLD AUTO: 6.8 K/MM3 (ref 4–10)

## 2018-10-07 RX ADMIN — LEVETIRACETAM SCH MG: 500 TABLET, FILM COATED ORAL at 10:59

## 2018-10-07 RX ADMIN — VANCOMYCIN HYDROCHLORIDE SCH MG: 250 CAPSULE ORAL at 11:45

## 2018-10-07 RX ADMIN — AMLODIPINE BESYLATE SCH MG: 10 TABLET ORAL at 10:59

## 2018-10-07 RX ADMIN — INSULIN ASPART SCH: 100 INJECTION, SOLUTION INTRAVENOUS; SUBCUTANEOUS at 06:42

## 2018-10-07 RX ADMIN — INSULIN ASPART SCH UNITS: 100 INJECTION, SOLUTION INTRAVENOUS; SUBCUTANEOUS at 16:43

## 2018-10-07 RX ADMIN — DEXTROSE AND SODIUM CHLORIDE SCH: 5; 900 INJECTION, SOLUTION INTRAVENOUS at 23:17

## 2018-10-07 RX ADMIN — METFORMIN HYDROCHLORIDE SCH MG: 500 TABLET ORAL at 06:42

## 2018-10-07 RX ADMIN — METFORMIN HYDROCHLORIDE SCH MG: 500 TABLET ORAL at 16:42

## 2018-10-07 RX ADMIN — ATORVASTATIN CALCIUM SCH MG: 10 TABLET, FILM COATED ORAL at 23:16

## 2018-10-07 RX ADMIN — PANTOPRAZOLE SODIUM SCH MG: 40 TABLET, DELAYED RELEASE ORAL at 10:59

## 2018-10-07 RX ADMIN — TRAZODONE HYDROCHLORIDE SCH MG: 50 TABLET ORAL at 23:17

## 2018-10-07 RX ADMIN — VANCOMYCIN HYDROCHLORIDE SCH MG: 250 CAPSULE ORAL at 23:15

## 2018-10-07 RX ADMIN — VANCOMYCIN HYDROCHLORIDE SCH MG: 250 CAPSULE ORAL at 17:45

## 2018-10-07 RX ADMIN — ZOLPIDEM TARTRATE PRN MG: 5 TABLET, COATED ORAL at 23:16

## 2018-10-07 RX ADMIN — LEVETIRACETAM SCH MG: 500 TABLET, FILM COATED ORAL at 23:16

## 2018-10-07 RX ADMIN — VANCOMYCIN HYDROCHLORIDE SCH MG: 250 CAPSULE ORAL at 06:41

## 2018-10-07 RX ADMIN — Medication SCH TAB: at 23:17

## 2018-10-07 RX ADMIN — DEXTROSE AND SODIUM CHLORIDE SCH MLS/HR: 5; 900 INJECTION, SOLUTION INTRAVENOUS at 17:45

## 2018-10-07 RX ADMIN — DONEPEZIL HYDROCHLORIDE SCH MG: 10 TABLET, FILM COATED ORAL at 23:16

## 2018-10-07 NOTE — PN
Progress Note (short form)





- Note


Progress Note: 





67 y/o female found lying in bed. Sister present at bedside. No apparent pain 

or distress. Pt nonverbal.





 Vital Signs











 Period  Temp  Pulse  Resp  BP Sys/Silver  Pulse Ox


 


 Last 24 Hr  97.6 F-98.9 F  68-88  18-20  122-132/63-68  








 CBC, BMP





 10/07/18 09:34 





 10/07/18 09:34 





HEENT- NL


Neck- Supple


Lungs- CTAB


Heart- S1/S2


Abd- soft, NT


Ext- No LE edema





Active Medications





Acetaminophen (Tylenol -)  650 mg PO Q4H PRN


   PRN Reason: FEVER


   Last Admin: 10/05/18 06:08 Dose:  650 mg


Amlodipine Besylate (Norvasc -)  10 mg PO DAILY [facility]


   Last Admin: 10/07/18 10:59 Dose:  10 mg


Aspirin (Asa -)  325 mg PO Q48H [facility]


   Last Admin: 10/06/18 23:02 Dose:  325 mg


Atorvastatin Calcium (Lipitor -)  10 mg PO HS [facility]


   Last Admin: 10/06/18 21:59 Dose:  10 mg


Donepezil HCl (Aricept -)  10 mg PO HS [facility]


   Last Admin: 10/06/18 22:00 Dose:  10 mg


Dextrose/Sodium Chloride (D5-Ns -)  1,000 mls @ 100 mls/hr IV ASDIR [facility]


   Last Admin: 10/06/18 23:29 Dose:  Not Given


Insulin Aspart (Novolog Vial Sliding Scale -)  1 vial SQ BIDAC [facility]; Protocol


   Last Admin: 10/07/18 06:42 Dose:  Not Given


Lactobacillus Acidophilus (Bacid -)  1 tab PO BID [facility]


Levetiracetam (Keppra -)  500 mg PO BID [facility]


   Last Admin: 10/07/18 10:59 Dose:  500 mg


Metformin HCl (Glucophage -)  1,000 mg PO BIDAC [facility]


   Last Admin: 10/07/18 06:42 Dose:  1,000 mg


Metoprolol Succinate (Toprol Xl -)  50 mg PO DAILY [facility]


   Last Admin: 10/07/18 10:59 Dose:  50 mg


Pantoprazole Sodium (Protonix -)  40 mg PO DAILY [facility]


   Last Admin: 10/07/18 10:59 Dose:  40 mg


Trazodone HCl (Desyrel -)  50 mg PO HS [facility]


   Last Admin: 10/06/18 22:00 Dose:  50 mg


Vancomycin HCl (Vancomycin Oral Solution)  125 mg PO Q6HPO [facility]


   Last Admin: 10/07/18 06:41 Dose:  125 mg


Zolpidem Tartrate (Ambien -)  5 mg PO HS PRN


   PRN Reason: INSOMNIA


   Last Admin: 10/06/18 22:00 Dose:  5 mg





#Febrile


Urine culture Neg


Blood cultures pending


Temp normal now


IV antibiotics per ID





#Hypopotassemia 


Potassium replaced





#c-diff


Bacid BID ordered





#Elevated Lactic acid


Repeat level


Cont IV fluids





#Dementia


At Baseline


Continue Aricept





#HTN


BP stable


Cont Amlodipine





#IDDM- 


Cont Novolog sliding scale


Metformin BID

## 2018-10-08 LAB
ALBUMIN SERPL-MCNC: 2.9 G/DL (ref 3.4–5)
ALP SERPL-CCNC: 79 U/L (ref 45–117)
ALT SERPL-CCNC: 22 U/L (ref 13–61)
ANION GAP SERPL CALC-SCNC: 12 MMOL/L (ref 8–16)
AST SERPL-CCNC: 10 U/L (ref 15–37)
BASOPHILS # BLD: 0.6 % (ref 0–2)
BILIRUB SERPL-MCNC: 0.4 MG/DL (ref 0.2–1)
BUN SERPL-MCNC: 8 MG/DL (ref 7–18)
CALCIUM SERPL-MCNC: 8.3 MG/DL (ref 8.5–10.1)
CHLORIDE SERPL-SCNC: 102 MMOL/L (ref 98–107)
CO2 SERPL-SCNC: 27 MMOL/L (ref 21–32)
CREAT SERPL-MCNC: 0.6 MG/DL (ref 0.55–1.3)
DEPRECATED RDW RBC AUTO: 14 % (ref 11.6–15.6)
EOSINOPHIL # BLD: 0.8 % (ref 0–4.5)
GLUCOSE SERPL-MCNC: 242 MG/DL (ref 74–106)
HCT VFR BLD CALC: 31.9 % (ref 32.4–45.2)
HGB BLD-MCNC: 10.8 GM/DL (ref 10.7–15.3)
LYMPHOCYTES # BLD: 24.5 % (ref 8–40)
MCH RBC QN AUTO: 27 PG (ref 25.7–33.7)
MCHC RBC AUTO-ENTMCNC: 34 G/DL (ref 32–36)
MCV RBC: 79.3 FL (ref 80–96)
MONOCYTES # BLD AUTO: 6.9 % (ref 3.8–10.2)
NEUTROPHILS # BLD: 67.2 % (ref 42.8–82.8)
PLATELET # BLD AUTO: 212 K/MM3 (ref 134–434)
PMV BLD: 7.3 FL (ref 7.5–11.1)
POTASSIUM SERPLBLD-SCNC: 3.1 MMOL/L (ref 3.5–5.1)
PROT SERPL-MCNC: 6.5 G/DL (ref 6.4–8.2)
RBC # BLD AUTO: 4.02 M/MM3 (ref 3.6–5.2)
SODIUM SERPL-SCNC: 140 MMOL/L (ref 136–145)
WBC # BLD AUTO: 6.5 K/MM3 (ref 4–10)

## 2018-10-08 RX ADMIN — INSULIN ASPART SCH UNITS: 100 INJECTION, SOLUTION INTRAVENOUS; SUBCUTANEOUS at 06:22

## 2018-10-08 RX ADMIN — VANCOMYCIN HYDROCHLORIDE SCH MG: 250 CAPSULE ORAL at 17:34

## 2018-10-08 RX ADMIN — VANCOMYCIN HYDROCHLORIDE SCH MG: 250 CAPSULE ORAL at 05:53

## 2018-10-08 RX ADMIN — PANTOPRAZOLE SODIUM SCH MG: 40 TABLET, DELAYED RELEASE ORAL at 11:06

## 2018-10-08 RX ADMIN — LEVETIRACETAM SCH MG: 500 TABLET, FILM COATED ORAL at 11:06

## 2018-10-08 RX ADMIN — DEXTROSE AND SODIUM CHLORIDE SCH MLS/HR: 5; 900 INJECTION, SOLUTION INTRAVENOUS at 00:35

## 2018-10-08 RX ADMIN — LEVETIRACETAM SCH MG: 500 TABLET, FILM COATED ORAL at 21:23

## 2018-10-08 RX ADMIN — VANCOMYCIN HYDROCHLORIDE SCH MG: 250 CAPSULE ORAL at 11:16

## 2018-10-08 RX ADMIN — TRAZODONE HYDROCHLORIDE SCH MG: 50 TABLET ORAL at 21:23

## 2018-10-08 RX ADMIN — METFORMIN HYDROCHLORIDE SCH MG: 500 TABLET ORAL at 06:25

## 2018-10-08 RX ADMIN — METFORMIN HYDROCHLORIDE SCH MG: 500 TABLET ORAL at 16:54

## 2018-10-08 RX ADMIN — DONEPEZIL HYDROCHLORIDE SCH MG: 10 TABLET, FILM COATED ORAL at 21:24

## 2018-10-08 RX ADMIN — Medication SCH TAB: at 21:23

## 2018-10-08 RX ADMIN — ASPIRIN 325 MG ORAL TABLET SCH MG: 325 PILL ORAL at 23:27

## 2018-10-08 RX ADMIN — AMLODIPINE BESYLATE SCH: 10 TABLET ORAL at 11:09

## 2018-10-08 RX ADMIN — Medication SCH TAB: at 11:05

## 2018-10-08 RX ADMIN — DEXTROSE AND SODIUM CHLORIDE SCH: 5; 900 INJECTION, SOLUTION INTRAVENOUS at 23:28

## 2018-10-08 RX ADMIN — ATORVASTATIN CALCIUM SCH MG: 10 TABLET, FILM COATED ORAL at 21:24

## 2018-10-08 RX ADMIN — INSULIN ASPART SCH UNITS: 100 INJECTION, SOLUTION INTRAVENOUS; SUBCUTANEOUS at 16:54

## 2018-10-08 RX ADMIN — VANCOMYCIN HYDROCHLORIDE SCH MG: 250 CAPSULE ORAL at 23:28

## 2018-10-08 RX ADMIN — DEXTROSE AND SODIUM CHLORIDE SCH MLS/HR: 5; 900 INJECTION, SOLUTION INTRAVENOUS at 17:33

## 2018-10-08 NOTE — PN
Progress Note, Physician





- Current Medication List


Current Medications: 


Active Medications





Acetaminophen (Tylenol -)  650 mg PO Q4H PRN


   PRN Reason: FEVER


   Last Admin: 10/05/18 06:08 Dose:  650 mg


Amlodipine Besylate (Norvasc -)  10 mg PO DAILY [facility]


   Last Admin: 10/08/18 11:09 Dose:  Not Given


Aspirin (Asa -)  325 mg PO Q48H [facility]


   Last Admin: 10/06/18 23:02 Dose:  325 mg


Atorvastatin Calcium (Lipitor -)  10 mg PO HS [facility]


   Last Admin: 10/08/18 21:24 Dose:  10 mg


Donepezil HCl (Aricept -)  10 mg PO HS [facility]


   Last Admin: 10/08/18 21:24 Dose:  10 mg


Dextrose/Sodium Chloride (D5-Ns -)  1,000 mls @ 100 mls/hr IV ASDIR [facility]


   Last Admin: 10/08/18 17:33 Dose:  100 mls/hr


Insulin Aspart (Novolog Vial Sliding Scale -)  1 vial SQ BIDAC [facility]; Protocol


   Last Admin: 10/08/18 16:54 Dose:  8 units


Lactobacillus Acidophilus (Bacid -)  1 tab PO BID [facility]


   Last Admin: 10/08/18 21:23 Dose:  1 tab


Levetiracetam (Keppra -)  500 mg PO BID [facility]


   Last Admin: 10/08/18 21:23 Dose:  500 mg


Metformin HCl (Glucophage -)  1,000 mg PO BIDAC [facility]


   Last Admin: 10/08/18 16:54 Dose:  1,000 mg


Metoprolol Succinate (Toprol Xl -)  50 mg PO DAILY [facility]


   Last Admin: 10/08/18 11:09 Dose:  Not Given


Pantoprazole Sodium (Protonix -)  40 mg PO DAILY [facility]


   Last Admin: 10/08/18 11:06 Dose:  40 mg


Trazodone HCl (Desyrel -)  50 mg PO HS [facility]


   Last Admin: 10/08/18 21:23 Dose:  50 mg


Vancomycin HCl (Vancomycin Oral Solution)  125 mg PO Q6HPO [facility]


   Last Admin: 10/08/18 17:34 Dose:  125 mg











- Objective


Vital Signs: 


 Vital Signs











Temperature  96.8 F L  10/08/18 14:25


 


Pulse Rate  75   10/08/18 14:25


 


Respiratory Rate  18   10/08/18 14:25


 


Blood Pressure  133/80   10/08/18 14:25


 


O2 Sat by Pulse Oximetry (%)  97   10/05/18 02:30











HENT: Yes: WNL


Neck: Yes: WNL, Supple


Cardiovascular: Yes: WNL, Regular Rate and Rhythm


Respiratory: Yes: WNL, Regular, CTA Bilaterally


Gastrointestinal: Yes: WNL, Normal Bowel Sounds, Soft


Labs: 


 CBC, BMP





 10/08/18 07:00 





 10/08/18 07:00 





 INR, PTT











INR  1.03  (0.83-1.09)   10/04/18  20:50    














Problem List





- Problems


(1) Sepsis


Assessment/Plan: 


Lactic acid still elevated


Cont IV antibxs


Code(s): A41.9 - SEPSIS, UNSPECIFIED ORGANISM   


Qualifiers: 


   Sepsis type: sepsis due to unspecified organism   Qualified Code(s): A41.9 - 

Sepsis, unspecified organism   





(2) Alzheimer disease


Code(s): G30.9 - ALZHEIMER'S DISEASE, UNSPECIFIED; F02.80 - DEMENTIA IN OTH 

DISEASES CLASSD ELSWHR W/O BEHAVRL DISTURB   





(3) Diabetes mellitus


Code(s): E11.9 - TYPE 2 DIABETES MELLITUS WITHOUT COMPLICATIONS   





(4) HTN (hypertension)


Code(s): I10 - ESSENTIAL (PRIMARY) HYPERTENSION

## 2018-10-09 LAB
ALBUMIN SERPL-MCNC: 2.9 G/DL (ref 3.4–5)
ALP SERPL-CCNC: 76 U/L (ref 45–117)
ALT SERPL-CCNC: 19 U/L (ref 13–61)
ANION GAP SERPL CALC-SCNC: 7 MMOL/L (ref 8–16)
AST SERPL-CCNC: 9 U/L (ref 15–37)
BASOPHILS # BLD: 0.8 % (ref 0–2)
BILIRUB SERPL-MCNC: 0.4 MG/DL (ref 0.2–1)
BUN SERPL-MCNC: 10 MG/DL (ref 7–18)
CALCIUM SERPL-MCNC: 8.3 MG/DL (ref 8.5–10.1)
CHLORIDE SERPL-SCNC: 104 MMOL/L (ref 98–107)
CO2 SERPL-SCNC: 27 MMOL/L (ref 21–32)
CREAT SERPL-MCNC: 0.6 MG/DL (ref 0.55–1.3)
DEPRECATED RDW RBC AUTO: 13.9 % (ref 11.6–15.6)
EOSINOPHIL # BLD: 0.8 % (ref 0–4.5)
GLUCOSE SERPL-MCNC: 205 MG/DL (ref 74–106)
HCT VFR BLD CALC: 32.3 % (ref 32.4–45.2)
HGB BLD-MCNC: 10.9 GM/DL (ref 10.7–15.3)
LYMPHOCYTES # BLD: 21.1 % (ref 8–40)
MCH RBC QN AUTO: 26.7 PG (ref 25.7–33.7)
MCHC RBC AUTO-ENTMCNC: 33.8 G/DL (ref 32–36)
MCV RBC: 79 FL (ref 80–96)
MONOCYTES # BLD AUTO: 6.6 % (ref 3.8–10.2)
NEUTROPHILS # BLD: 70.7 % (ref 42.8–82.8)
PLATELET # BLD AUTO: 220 K/MM3 (ref 134–434)
PMV BLD: 8.3 FL (ref 7.5–11.1)
POTASSIUM SERPLBLD-SCNC: 3.5 MMOL/L (ref 3.5–5.1)
PROT SERPL-MCNC: 6.3 G/DL (ref 6.4–8.2)
RBC # BLD AUTO: 4.09 M/MM3 (ref 3.6–5.2)
SODIUM SERPL-SCNC: 139 MMOL/L (ref 136–145)
WBC # BLD AUTO: 6.7 K/MM3 (ref 4–10)

## 2018-10-09 RX ADMIN — DONEPEZIL HYDROCHLORIDE SCH MG: 10 TABLET, FILM COATED ORAL at 21:46

## 2018-10-09 RX ADMIN — Medication SCH TAB: at 21:45

## 2018-10-09 RX ADMIN — AMLODIPINE BESYLATE SCH MG: 10 TABLET ORAL at 11:05

## 2018-10-09 RX ADMIN — ATORVASTATIN CALCIUM SCH MG: 10 TABLET, FILM COATED ORAL at 21:45

## 2018-10-09 RX ADMIN — DEXTROSE AND SODIUM CHLORIDE SCH MLS/HR: 5; 900 INJECTION, SOLUTION INTRAVENOUS at 17:58

## 2018-10-09 RX ADMIN — VANCOMYCIN HYDROCHLORIDE SCH MG: 250 CAPSULE ORAL at 12:20

## 2018-10-09 RX ADMIN — VANCOMYCIN HYDROCHLORIDE SCH MG: 250 CAPSULE ORAL at 23:15

## 2018-10-09 RX ADMIN — LEVETIRACETAM SCH MG: 500 TABLET, FILM COATED ORAL at 21:45

## 2018-10-09 RX ADMIN — INSULIN ASPART SCH UNITS: 100 INJECTION, SOLUTION INTRAVENOUS; SUBCUTANEOUS at 16:52

## 2018-10-09 RX ADMIN — LEVETIRACETAM SCH MG: 500 TABLET, FILM COATED ORAL at 11:05

## 2018-10-09 RX ADMIN — VANCOMYCIN HYDROCHLORIDE SCH MG: 250 CAPSULE ORAL at 06:32

## 2018-10-09 RX ADMIN — VANCOMYCIN HYDROCHLORIDE SCH MG: 250 CAPSULE ORAL at 17:58

## 2018-10-09 RX ADMIN — METFORMIN HYDROCHLORIDE SCH MG: 500 TABLET ORAL at 06:32

## 2018-10-09 RX ADMIN — INSULIN ASPART SCH UNITS: 100 INJECTION, SOLUTION INTRAVENOUS; SUBCUTANEOUS at 06:32

## 2018-10-09 RX ADMIN — Medication SCH TAB: at 11:04

## 2018-10-09 RX ADMIN — DEXTROSE AND SODIUM CHLORIDE SCH MLS/HR: 5; 900 INJECTION, SOLUTION INTRAVENOUS at 12:20

## 2018-10-09 RX ADMIN — METFORMIN HYDROCHLORIDE SCH MG: 500 TABLET ORAL at 16:51

## 2018-10-09 RX ADMIN — PANTOPRAZOLE SODIUM SCH MG: 40 TABLET, DELAYED RELEASE ORAL at 11:04

## 2018-10-09 RX ADMIN — TRAZODONE HYDROCHLORIDE SCH MG: 50 TABLET ORAL at 21:46

## 2018-10-09 NOTE — PN
Progress Note, Physician


History of Present Illness: 





Mrs. Trevizo is a 68 year old female, with a significant past medical history of 

Dementia, DM, HTN, CDI. She was recently Rx with PO Levaquin a few weeks ago ( C

/s grew Klebsiella). Her daughter had submitted a urine sample Oct 1,2018 which 

again was (+) for UTI with significant pyuria and cultures again (+) 

Klebsiella. She had seen her PMD several days ago, and when she got home 

developed sudden onset fever, rigors and sweats. Her daughter had called me and 

I advised inpatient admission. In the ED, she received IV Levaquin and VAnco 

and was started on IV Fluids.





- Current Medication List


Current Medications: 


Active Medications





Acetaminophen (Tylenol -)  650 mg PO Q4H PRN


   PRN Reason: FEVER


   Last Admin: 10/05/18 06:08 Dose:  650 mg


Amlodipine Besylate (Norvasc -)  10 mg PO DAILY [facility]


   Last Admin: 10/09/18 11:05 Dose:  10 mg


Aspirin (Asa -)  325 mg PO Q48H [facility]


   Last Admin: 10/08/18 23:27 Dose:  325 mg


Atorvastatin Calcium (Lipitor -)  10 mg PO HS [facility]


   Last Admin: 10/08/18 21:24 Dose:  10 mg


Donepezil HCl (Aricept -)  10 mg PO HS [facility]


   Last Admin: 10/08/18 21:24 Dose:  10 mg


Dextrose/Sodium Chloride (D5-Ns -)  1,000 mls @ 100 mls/hr IV ASDIR [facility]


   Last Admin: 10/09/18 17:58 Dose:  100 mls/hr


Insulin Aspart (Novolog Vial Sliding Scale -)  1 vial SQ BIDAC [facility]; Protocol


   Last Admin: 10/09/18 16:52 Dose:  10 units


Lactobacillus Acidophilus (Bacid -)  1 tab PO BID [facility]


   Last Admin: 10/09/18 11:04 Dose:  1 tab


Levetiracetam (Keppra -)  500 mg PO BID [facility]


   Last Admin: 10/09/18 11:05 Dose:  500 mg


Metformin HCl (Glucophage -)  1,000 mg PO BIDAC [facility]


   Last Admin: 10/09/18 16:51 Dose:  1,000 mg


Metoprolol Succinate (Toprol Xl -)  50 mg PO DAILY [facility]


   Last Admin: 10/09/18 11:04 Dose:  50 mg


Pantoprazole Sodium (Protonix -)  40 mg PO DAILY [facility]


   Last Admin: 10/09/18 11:04 Dose:  40 mg


Trazodone HCl (Desyrel -)  50 mg PO HS [facility]


   Last Admin: 10/08/18 21:23 Dose:  50 mg


Vancomycin HCl (Vancomycin Oral Solution)  125 mg PO Q6HPO [facility]


   Last Admin: 10/09/18 17:58 Dose:  125 mg











- Objective


Vital Signs: 


 Vital Signs











Temperature  97.7 F   10/09/18 05:54


 


Pulse Rate  72   10/09/18 09:15


 


Respiratory Rate  18   10/09/18 09:15


 


Blood Pressure  156/72   10/09/18 09:15


 


O2 Sat by Pulse Oximetry (%)  97   10/05/18 02:30











Constitutional: Yes: Well Nourished


Neck: Yes: Supple


Cardiovascular: Yes: Regular Rate and Rhythm


Respiratory: Yes: CTA Bilaterally


Gastrointestinal: Yes: Normal Bowel Sounds, Soft


Labs: 


 CBC, BMP





 10/09/18 06:45 





 10/09/18 06:45 





 INR, PTT











INR  1.03  (0.83-1.09)   10/04/18  20:50    














Problem List





- Problems


(1) Sepsis


Code(s): A41.9 - SEPSIS, UNSPECIFIED ORGANISM   


Qualifiers: 


   Sepsis type: sepsis due to unspecified organism   Qualified Code(s): A41.9 - 

Sepsis, unspecified organism   





(2) C. difficile diarrhea


Code(s): A04.72 - ENTEROCOLITIS D/T CLOSTRIDIUM DIFFICILE, NOT SPCF AS RECUR   





Assessment/Plan


Fever and UTI in a 68 yr old female with dementia


Sepsis Syndrome





Can DC in AM and Rx Levaquin for 3 more days.

## 2018-10-09 NOTE — PN
Progress Note (short form)





- Note


Progress Note: 





67 y/o female lying in bed. Dtr present. Pt appears comfortable but rubbing her 

stomach.





 Vital Signs











 Period  Temp  Pulse  Resp  BP Sys/Silver  Pulse Ox


 


 Last 24 Hr  96.8 F-98.2 F  70-85  18-20  102-140/43-80  








 CBC, BMP





 10/09/18 06:45 





 10/09/18 06:45 





HEENT- NL


Neck- supple


Lungs- CTAB


Heart- S1/S2


Abd- Soft, nt


Ext- No LE edema





Active Medications





Acetaminophen (Tylenol -)  650 mg PO Q4H PRN


   PRN Reason: FEVER


   Last Admin: 10/05/18 06:08 Dose:  650 mg


Amlodipine Besylate (Norvasc -)  10 mg PO DAILY [facility]


   Last Admin: 10/08/18 11:09 Dose:  Not Given


Aspirin (Asa -)  325 mg PO Q48H [facility]


   Last Admin: 10/08/18 23:27 Dose:  325 mg


Atorvastatin Calcium (Lipitor -)  10 mg PO HS [facility]


   Last Admin: 10/08/18 21:24 Dose:  10 mg


Donepezil HCl (Aricept -)  10 mg PO HS [facility]


   Last Admin: 10/08/18 21:24 Dose:  10 mg


Insulin Aspart (Novolog Vial Sliding Scale -)  1 vial SQ BIDAC [facility]; Protocol


   Last Admin: 10/09/18 06:32 Dose:  2 units


Lactobacillus Acidophilus (Bacid -)  1 tab PO BID [facility]


   Last Admin: 10/08/18 21:23 Dose:  1 tab


Levetiracetam (Keppra -)  500 mg PO BID [facility]


   Last Admin: 10/08/18 21:23 Dose:  500 mg


Metformin HCl (Glucophage -)  1,000 mg PO BIDAC [facility]


   Last Admin: 10/09/18 06:32 Dose:  1,000 mg


Metoprolol Succinate (Toprol Xl -)  50 mg PO DAILY [facility]


   Last Admin: 10/08/18 11:09 Dose:  Not Given


Pantoprazole Sodium (Protonix -)  40 mg PO DAILY [facility]


   Last Admin: 10/08/18 11:06 Dose:  40 mg


Trazodone HCl (Desyrel -)  50 mg PO HS [facility]


   Last Admin: 10/08/18 21:23 Dose:  50 mg


Vancomycin HCl (Vancomycin Oral Solution)  125 mg PO Q6HPO [facility]


   Last Admin: 10/09/18 06:32 Dose:  125 mg





#Nausea/vomiting


Zofran 4mg ordered





#Afebrile- WBC ct normal


Blood cultures pending


IV antibiotics per ID





#Hypopotassemia 


Potassium replaced, now normal





#c-diff


Continue Bacid BID 





#Elevated Lactic acid


Repeat level- now 2.4


Replace line for IV fluids





#Dementia


At Baseline


Continue Aricept





#HTN


BP stable


Cont Amlodipine





#IDDM- 


Cont Novolog sliding scale


Metformin BID

## 2018-10-10 LAB
ALBUMIN SERPL-MCNC: 2.9 G/DL (ref 3.4–5)
ALP SERPL-CCNC: 77 U/L (ref 45–117)
ALT SERPL-CCNC: 20 U/L (ref 13–61)
ANION GAP SERPL CALC-SCNC: 13 MMOL/L (ref 8–16)
AST SERPL-CCNC: 7 U/L (ref 15–37)
BASOPHILS # BLD: 0.4 % (ref 0–2)
BILIRUB SERPL-MCNC: 0.4 MG/DL (ref 0.2–1)
BUN SERPL-MCNC: 8 MG/DL (ref 7–18)
CALCIUM SERPL-MCNC: 8.2 MG/DL (ref 8.5–10.1)
CHLORIDE SERPL-SCNC: 101 MMOL/L (ref 98–107)
CO2 SERPL-SCNC: 27 MMOL/L (ref 21–32)
CREAT SERPL-MCNC: 0.7 MG/DL (ref 0.55–1.3)
DEPRECATED RDW RBC AUTO: 14.2 % (ref 11.6–15.6)
EOSINOPHIL # BLD: 0.7 % (ref 0–4.5)
GLUCOSE SERPL-MCNC: 253 MG/DL (ref 74–106)
HCT VFR BLD CALC: 31.5 % (ref 32.4–45.2)
HGB BLD-MCNC: 10.8 GM/DL (ref 10.7–15.3)
LYMPHOCYTES # BLD: 15.1 % (ref 8–40)
MCH RBC QN AUTO: 27 PG (ref 25.7–33.7)
MCHC RBC AUTO-ENTMCNC: 34.2 G/DL (ref 32–36)
MCV RBC: 79.2 FL (ref 80–96)
MONOCYTES # BLD AUTO: 5.9 % (ref 3.8–10.2)
NEUTROPHILS # BLD: 77.9 % (ref 42.8–82.8)
PLATELET # BLD AUTO: 218 K/MM3 (ref 134–434)
PMV BLD: 6.9 FL (ref 7.5–11.1)
POTASSIUM SERPLBLD-SCNC: 3 MMOL/L (ref 3.5–5.1)
PROT SERPL-MCNC: 6.4 G/DL (ref 6.4–8.2)
RBC # BLD AUTO: 3.98 M/MM3 (ref 3.6–5.2)
SODIUM SERPL-SCNC: 140 MMOL/L (ref 136–145)
WBC # BLD AUTO: 8 K/MM3 (ref 4–10)

## 2018-10-10 RX ADMIN — LEVETIRACETAM SCH MG: 500 TABLET, FILM COATED ORAL at 10:57

## 2018-10-10 RX ADMIN — VANCOMYCIN HYDROCHLORIDE SCH MG: 250 CAPSULE ORAL at 17:21

## 2018-10-10 RX ADMIN — DEXTROSE AND SODIUM CHLORIDE SCH: 5; 900 INJECTION, SOLUTION INTRAVENOUS at 13:21

## 2018-10-10 RX ADMIN — SODIUM CHLORIDE SCH MLS/HR: 9 INJECTION, SOLUTION INTRAVENOUS at 16:29

## 2018-10-10 RX ADMIN — Medication SCH TAB: at 10:56

## 2018-10-10 RX ADMIN — DEXTROSE AND SODIUM CHLORIDE SCH MLS/HR: 5; 900 INJECTION, SOLUTION INTRAVENOUS at 06:38

## 2018-10-10 RX ADMIN — VANCOMYCIN HYDROCHLORIDE SCH MG: 250 CAPSULE ORAL at 13:22

## 2018-10-10 RX ADMIN — METFORMIN HYDROCHLORIDE SCH MG: 500 TABLET ORAL at 06:33

## 2018-10-10 RX ADMIN — ASPIRIN 325 MG ORAL TABLET SCH MG: 325 PILL ORAL at 22:43

## 2018-10-10 RX ADMIN — ATORVASTATIN CALCIUM SCH MG: 10 TABLET, FILM COATED ORAL at 22:43

## 2018-10-10 RX ADMIN — PANTOPRAZOLE SODIUM SCH MG: 40 TABLET, DELAYED RELEASE ORAL at 10:58

## 2018-10-10 RX ADMIN — INSULIN ASPART SCH UNITS: 100 INJECTION, SOLUTION INTRAVENOUS; SUBCUTANEOUS at 16:29

## 2018-10-10 RX ADMIN — LEVETIRACETAM SCH MG: 500 TABLET, FILM COATED ORAL at 22:42

## 2018-10-10 RX ADMIN — Medication SCH TAB: at 22:42

## 2018-10-10 RX ADMIN — AMLODIPINE BESYLATE SCH MG: 10 TABLET ORAL at 10:57

## 2018-10-10 RX ADMIN — METFORMIN HYDROCHLORIDE SCH MG: 500 TABLET ORAL at 17:21

## 2018-10-10 RX ADMIN — VANCOMYCIN HYDROCHLORIDE SCH MG: 250 CAPSULE ORAL at 06:32

## 2018-10-10 RX ADMIN — TRAZODONE HYDROCHLORIDE SCH MG: 50 TABLET ORAL at 22:43

## 2018-10-10 RX ADMIN — DONEPEZIL HYDROCHLORIDE SCH MG: 10 TABLET, FILM COATED ORAL at 22:43

## 2018-10-10 RX ADMIN — INSULIN ASPART SCH UNITS: 100 INJECTION, SOLUTION INTRAVENOUS; SUBCUTANEOUS at 06:33

## 2018-10-10 NOTE — PN
Progress Note (short form)





- Note


Progress Note: 





67 y/o female found lying in bed. Family present.





 Vital Signs











 Period  Temp  Pulse  Resp  BP Sys/Silver  Pulse Ox


 


 Last 24 Hr  98.9 F  68  20  154/78  








 CBC, BMP





 10/10/18 07:30 





 10/10/18 07:30 


HEENT- NL


Neck- supple


Lungs- CTAB


Heart- S1/S2


Abd-soft, nt


Ext- No LE edema


Active Medications





Acetaminophen (Tylenol -)  650 mg PO Q4H PRN


   PRN Reason: FEVER


   Last Admin: 10/05/18 06:08 Dose:  650 mg


Amlodipine Besylate (Norvasc -)  10 mg PO DAILY [facility]


   Last Admin: 10/10/18 10:57 Dose:  10 mg


Aspirin (Asa -)  325 mg PO Q48H [facility]


   Last Admin: 10/08/18 23:27 Dose:  325 mg


Atorvastatin Calcium (Lipitor -)  10 mg PO HS [facility]


   Last Admin: 10/09/18 21:45 Dose:  10 mg


Donepezil HCl (Aricept -)  10 mg PO HS [facility]


   Last Admin: 10/09/18 21:46 Dose:  10 mg


Dextrose/Sodium Chloride (D5-Ns -)  1,000 mls @ 100 mls/hr IV [facility]IR [facility]


   Last Admin: 10/10/18 13:21 Dose:  Not Given


Sodium Chloride (Normal Saline -)  1,000 mls @ 100 mls/hr IV [facility]


   Last Admin: 10/10/18 16:29 Dose:  100 mls/hr


Insulin Aspart (Novolog Vial Sliding Scale -)  1 vial SQ BIDAC [facility]; Protocol


   Last Admin: 10/10/18 16:29 Dose:  4 units


Lactobacillus Acidophilus (Bacid -)  1 tab PO BID [facility]


   Last Admin: 10/10/18 10:56 Dose:  1 tab


Levetiracetam (Keppra -)  500 mg PO BID [facility]


   Last Admin: 10/10/18 10:57 Dose:  500 mg


Metformin HCl (Glucophage -)  1,000 mg PO BIDAC [facility]


   Last Admin: 10/10/18 17:21 Dose:  1,000 mg


Metoprolol Succinate (Toprol Xl -)  50 mg PO DAILY [facility]


   Last Admin: 10/10/18 10:58 Dose:  50 mg


Pantoprazole Sodium (Protonix -)  40 mg PO DAILY [facility]


   Last Admin: 10/10/18 10:58 Dose:  40 mg


Trazodone HCl (Desyrel -)  50 mg PO HS [facility]


   Last Admin: 10/09/18 21:46 Dose:  50 mg


Vancomycin HCl (Vancomycin Oral Solution)  125 mg PO Q6HPO [facility]


   Last Admin: 10/10/18 17:21 Dose:  125 mg


Zolpidem Tartrate (Ambien -)  5 mg PO HS PRN


   PRN Reason: INSOMNIA








#Afebrile- WBC ct normal


Blood cultures neg


antibiotics per ID





#c-diff


Continue Bacid BID 





#Elevated Lactic acid


Repeat level- now 4.8





#Dementia


At Baseline


Continue Aricept





#HTN


BP stable


Cont Amlodipine





#IDDM- 


Cont Novolog sliding scale


Metformin BID





Plan- DC home tomorrow.

## 2018-10-11 VITALS — DIASTOLIC BLOOD PRESSURE: 76 MMHG | SYSTOLIC BLOOD PRESSURE: 148 MMHG | TEMPERATURE: 98.3 F

## 2018-10-11 VITALS — HEART RATE: 73 BPM

## 2018-10-11 RX ADMIN — AMLODIPINE BESYLATE SCH MG: 10 TABLET ORAL at 09:45

## 2018-10-11 RX ADMIN — LEVETIRACETAM SCH MG: 500 TABLET, FILM COATED ORAL at 09:44

## 2018-10-11 RX ADMIN — Medication SCH TAB: at 09:45

## 2018-10-11 RX ADMIN — VANCOMYCIN HYDROCHLORIDE SCH MG: 250 CAPSULE ORAL at 00:09

## 2018-10-11 RX ADMIN — VANCOMYCIN HYDROCHLORIDE SCH MG: 250 CAPSULE ORAL at 06:16

## 2018-10-11 RX ADMIN — INSULIN ASPART SCH UNITS: 100 INJECTION, SOLUTION INTRAVENOUS; SUBCUTANEOUS at 06:17

## 2018-10-11 RX ADMIN — PANTOPRAZOLE SODIUM SCH MG: 40 TABLET, DELAYED RELEASE ORAL at 09:45

## 2018-10-11 RX ADMIN — SODIUM CHLORIDE SCH MLS/HR: 9 INJECTION, SOLUTION INTRAVENOUS at 00:09

## 2018-10-11 NOTE — DS
Physical Examination


Vital Signs: 


 Vital Signs











Temperature  97 F L  10/11/18 06:00


 


Pulse Rate  73   10/11/18 06:00


 


Respiratory Rate  20   10/11/18 06:00


 


Blood Pressure  142/74   10/11/18 06:00


 


O2 Sat by Pulse Oximetry (%)  97   10/05/18 02:30











Constitutional: Yes: Well Nourished


Eyes: Yes: Conjunctiva Clear


HENT: Yes: Atraumatic, Normocephalic


Neck: Yes: Supple, Trachea Midline


Cardiovascular: Yes: Regular Rate and Rhythm


Respiratory: Yes: CTA Bilaterally


Gastrointestinal: Yes: Normal Bowel Sounds, Soft


...Rectal Exam: Yes: Deferred


Renal/: Yes: WNL


Breast(s): Yes: WNL


Musculoskeletal: Yes: WNL


Extremities: Yes: WNL


Edema: No


Peripheral Pulses WNL: No


Integumentary: Yes: WNL


Neurological: Yes: Alert


...Motor Strength: WNL


Psychiatric: Yes: Alert


Labs: 


 CBC, BMP





 10/10/18 07:30 





 10/10/18 07:30 











Discharge Summary


Reason For Visit: SEPSIS


Current Active Problems





Sepsis (Acute)








Hospital Course: 





The patient is a 68 year old female, with a past medical history of nonverbal (

baseline), diabetes, htn, dementia, chronic C.Diff, and lymphoma who presents 

to the emergency department with family for  sudden onset weakness, chills and 

fever. Treated for UTI.





Condition: Fair





- Instructions


Diet, Activity, Other Instructions: 


Please return to the emergency department with any new or worsening symptoms or 

concerns. Please follow up with your primary care physician within 72 hours.








Referrals: 


Reuben Blackmon MD [Primary Care Provider] - 


Disposition: HOME





- Home Medications


Comprehensive Discharge Medication List: 


Ambulatory Orders





Metformin HCl 1,000 mg PO BIDAC 09/25/14 


Pravastatin Sodium 10 mg PO HS 03/28/18 


Donepezil HCl 10 mg PO HS 03/29/18 


Insulin Glargine,Hum.rec.anlog [Lantus Solostar] 30 unit SQ DAILY 03/29/18 


Pantoprazole Sodium [Protonix] 40 mg PO DAILY 03/29/18 


traZODone HCL [Desyrel -] 50 mg PO HS  tablet 05/04/18 


Aspirin [ASA -] 325 mg PO Q48H 06/29/18 


Zolpidem Tartrate [Ambien] 5 mg PO HS 06/29/18 


levETIRAcetam [Keppra -] 500 mg PO BID 06/29/18 


Metoprolol Succinate 50 mg PO 07/02/18 


Amlodipine Besylate [Norvasc -] 10 mg PO DAILY #30 tablet 07/03/18 


Vancomycin HCl 125 mg PO Q6H #28 capsule 07/06/18

## 2019-10-03 ENCOUNTER — HOSPITAL ENCOUNTER (EMERGENCY)
Dept: HOSPITAL 74 - JER | Age: 69
Discharge: HOME | End: 2019-10-03
Payer: COMMERCIAL

## 2019-10-03 VITALS — HEART RATE: 74 BPM | SYSTOLIC BLOOD PRESSURE: 156 MMHG | DIASTOLIC BLOOD PRESSURE: 80 MMHG

## 2019-10-03 VITALS — TEMPERATURE: 98.4 F

## 2019-10-03 VITALS — BODY MASS INDEX: 17.2 KG/M2
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
## 2019-10-03 DIAGNOSIS — S09.8XXA: ICD-10-CM

## 2019-10-03 DIAGNOSIS — B96.89: ICD-10-CM

## 2019-10-03 DIAGNOSIS — R13.19: ICD-10-CM

## 2019-10-03 DIAGNOSIS — Z86.69: ICD-10-CM

## 2019-10-03 DIAGNOSIS — E11.9: ICD-10-CM
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
## 2019-10-03 DIAGNOSIS — Z79.4: ICD-10-CM

## 2019-10-03 DIAGNOSIS — Z96.0: ICD-10-CM
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
## 2019-10-03 DIAGNOSIS — F03.90: ICD-10-CM
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
## 2019-10-03 DIAGNOSIS — N39.0: Primary | ICD-10-CM

## 2019-10-03 DIAGNOSIS — C85.90: ICD-10-CM

## 2019-10-03 DIAGNOSIS — I10: ICD-10-CM

## 2019-10-03 LAB
ALBUMIN SERPL-MCNC: 4.1 G/DL (ref 3.4–5)
ALP SERPL-CCNC: 87 U/L (ref 45–117)
ALT SERPL-CCNC: 35 U/L (ref 13–61)
ANION GAP SERPL CALC-SCNC: 10 MMOL/L (ref 8–16)
APPEARANCE UR: CLEAR
APTT BLD: 33.2 SECONDS (ref 25.2–36.5)
AST SERPL-CCNC: 19 U/L (ref 15–37)
BACTERIA # UR AUTO: 20.8 /HPF
BASOPHILS # BLD: 0.5 % (ref 0–2)
BILIRUB SERPL-MCNC: 0.4 MG/DL (ref 0.2–1)
BILIRUB UR STRIP.AUTO-MCNC: NEGATIVE MG/DL
BUN SERPL-MCNC: 21.6 MG/DL (ref 7–18)
CALCIUM SERPL-MCNC: 9.2 MG/DL (ref 8.5–10.1)
CASTS URNS QL MICRO: 5 /LPF (ref 0–8)
CHLORIDE SERPL-SCNC: 101 MMOL/L (ref 98–107)
CO2 SERPL-SCNC: 27 MMOL/L (ref 21–32)
COLOR UR: YELLOW
CREAT SERPL-MCNC: 0.8 MG/DL (ref 0.55–1.3)
DEPRECATED RDW RBC AUTO: 13.7 % (ref 11.6–15.6)
EOSINOPHIL # BLD: 0.4 % (ref 0–4.5)
EPITH CASTS URNS QL MICRO: 13.7 /HPF
GLUCOSE SERPL-MCNC: 164 MG/DL (ref 74–106)
HCT VFR BLD CALC: 40.8 % (ref 32.4–45.2)
HGB BLD-MCNC: 14 GM/DL (ref 10.7–15.3)
INR BLD: 0.95 (ref 0.83–1.09)
KETONES UR QL STRIP: (no result)
LEUKOCYTE ESTERASE UR QL STRIP.AUTO: (no result)
LYMPHOCYTES # BLD: 23.9 % (ref 8–40)
MCH RBC QN AUTO: 29.3 PG (ref 25.7–33.7)
MCHC RBC AUTO-ENTMCNC: 34.4 G/DL (ref 32–36)
MCV RBC: 85.2 FL (ref 80–96)
MONOCYTES # BLD AUTO: 5.2 % (ref 3.8–10.2)
NEUTROPHILS # BLD: 70 % (ref 42.8–82.8)
NITRITE UR QL STRIP: NEGATIVE
PH UR: 7.5 [PH] (ref 5–8)
PLATELET # BLD AUTO: 191 K/MM3 (ref 134–434)
PMV BLD: 7.8 FL (ref 7.5–11.1)
POTASSIUM SERPLBLD-SCNC: 4.1 MMOL/L (ref 3.5–5.1)
PROT SERPL-MCNC: 8 G/DL (ref 6.4–8.2)
PROT UR QL STRIP: NEGATIVE
PROT UR QL STRIP: NEGATIVE
PT PNL PPP: 11.2 SEC (ref 9.7–13)
RBC # BLD AUTO: 0 /HPF (ref 0–4)
RBC # BLD AUTO: 4.79 M/MM3 (ref 3.6–5.2)
SODIUM SERPL-SCNC: 138 MMOL/L (ref 136–145)
SP GR UR: 1.01 (ref 1.01–1.03)
UROBILINOGEN UR STRIP-MCNC: 0.2 MG/DL (ref 0.2–1)
WBC # BLD AUTO: 7.2 K/MM3 (ref 4–10)
WBC # UR AUTO: 6 /HPF (ref 0–5)

## 2019-10-03 NOTE — PDOC
History of Present Illness





- General


Chief Complaint: Injury


Stated Complaint: FALL


Time Seen by Provider: 10/03/19 19:44


History Source: Family (daughter, son)


Exam Limitations: Clinical Condition (nonverbal)





- History of Present Illness


Initial Comments: 


70 yo F PMH Alzheimer's, HTN, IDDM, hx UTI sepsis c/b seizures, now nonverbal 

and with dysphagia, presenting after fall. All history per son and daughter, 

who take care of her at home. They state that she was laying on the couch and 

fell onto her L side, hit her head. No apparent LOC. Reports that for the past 

week, she has been coughing and having low grade fevers to the 99s-100s. 

Patient went to see GI doctor this morning due to having blood when wiping 

after bowel movement. Family uncertain whether patient has been having dark 

stools, but reports that she has not been having any active rectal bleeding. 

Otherwise appears to be at her baseline.


10/03/19 20:32








Past History





- Past Medical History


Allergies/Adverse Reactions: 


 Allergies











Allergy/AdvReac Type Severity Reaction Status Date / Time


 


Penicillins Allergy   Verified 10/03/19 20:17











Home Medications: 


Ambulatory Orders





Donepezil HCl 10 mg PO HS 03/29/18 


traZODone HCL [Desyrel -] 50 mg PO HS  tablet 05/04/18 


Zolpidem Tartrate [Ambien] 5 mg PO HS 06/29/18 


levETIRAcetam [Keppra -] 500 mg PO BID 06/29/18 


Metoprolol Succinate [Toprol XL -] 50 mg PO DAILY  tab.sr.24h 10/11/18 


metFORMIN HCL [Glucophage -] 1,000 mg PO BIDAC  tablet 10/11/18 


Amlodipine Besylate [Norvasc -] 5 mg PO DAILY 10/03/19 


Ascorbic Acid [Vitamin C] 1,500 mg PO DAILY 10/03/19 


Folic Acid - 1 mg PO DAILY 10/03/19 


Insulin Glargine,Hum.rec.anlog [Lantus Solostar PEN -] 10 unit SQ AC 10/03/19 


Nitrofurantoin 100 mg PO BID #250 ml 10/03/19 


Nitrofurantoin Macrocrystal [Macrodantin -] 100 mg PO ONCE  capsule 10/03/19 


Pravastatin Sodium 20 mg PO HS 10/03/19 








Cancer: Yes (NON-HODGKINS)


Cardiac Disorders: Yes (CAD,)


COPD: No


Dementia: Yes (severe dementia (non-verbal at baseline))


Diabetes: Yes


 Disorders: Yes (UTERUR STENT)


HTN: Yes


Hypercholesterolemia: Yes


Psychiatric Problems: Yes (dementia)


Seizures: Yes


Other medical history: DYSPHAGIA





- Surgical History


Abdominal Surgery: Yes (Kidney stent)





- Immunization History


Immunization Up to Date: Yes





- Psycho Social/Smoking Cessation Hx


Smoking History: Former smoker


Have you smoked in the past 12 months: No


Number of Cigarettes Smoked Daily: 0


If you are a former smoker, when did you quit?: 6 months ago


Information on smoking cessation initiated: No


'Breaking Loose' booklet given: 03/29/18


Hx Alcohol Use: No


Drug/Substance Use Hx: No


Substance Use Type: None





**Review of Systems





- Review of Systems


Able to Perform ROS?: No (nonverbal)





*Physical Exam





- Vital Signs


 Last Vital Signs











Temp Pulse Resp BP Pulse Ox


 


 97.9 F   74   18   156/80   97 


 


 10/03/19 20:11  10/03/19 20:11  10/03/19 20:11  10/03/19 20:11  10/03/19 20:11














- Physical Exam


Comments: 


10/03/19 20:55





Gen: NAD, well-appearing


Neuro: does not respond, PERRLA, EOMI, moving limbs spontaneously. Unable to 

accurately assess cranial nerves, sensation, or strength.


HEENT: normocephalic, minor contusion in upper left head


Neck: trachea midline, supple


CV: regular rate, regular rhythm


Pulm: CTA b/l, no wheezing


Abd: soft, non-distended, non-tender


MSK: normal musculature, pulses intact


Skin: warm, dry


Extr: no edema, no deformities








ED Treatment Course





- LABORATORY


CBC & Chemistry Diagram: 


 10/03/19 20:42





 10/03/19 20:42





- RADIOLOGY


Radiology Studies Ordered: 














 Category Date Time Status


 


 HEAD CT WITHOUT CONTRAST [CT] Stat CT Scan  10/03/19 19:56 Ordered














Medical Decision Making





- Medical Decision Making


10/03/19 21:01





70 yo F nonverbal presenting after fall.


- CT head non con, CT C-spine


- CBC, CMP


- EKG, trop


- CXR port


- coags, T+S


- UA/UC


- rectal temp 98.9


- stool for occult blood





10/03/19 21:15


EKG poor baseline, 74 bpm, normal sinus, LAD





10/03/19 21:46


CXR does not appear to have acute pathology.





10/03/19 22:54


CT head with marked periventricular and subcortical chronic microvascular 

ischemic changes. No acute C-spine fx. Will dc home with Macrobid suspension.








Discharge





- Discharge Information


Problems reviewed: Yes


Clinical Impression/Diagnosis: 


 UTI (urinary tract infection)





Condition: Good


Disposition: HOME





- Admission


No





- Additional Discharge Information


Prescriptions: 


Nitrofurantoin 100 mg PO BID #250 ml





- Follow up/Referral


Referrals: 


Precious Ziegler [Primary Care Provider] - 





- Patient Discharge Instructions


Patient Printed Discharge Instructions:  DI for Urinary Tract Infection (UTI)


Additional Instructions: 


You were seen after a fall. Your CT scans did not show any acute changes. 

However, you were found to have a urinary tract infection (UTI). Please take 

your Macrobid twice a day for the next 5 days. Follow up with your primary care 

doctor. Return to the ED if you develop shortness of breath or have any falls.





- Post Discharge Activity

## 2019-10-03 NOTE — PDOC
Documentation entered by Farhad Dunbar SCRIBE, acting as scribe for Maria Esther Lam DO.








Maria Esther Lam DO:  This documentation has been prepared by the Manjinder sabillon Daniel, SCRIBE, under my direction and personally reviewed by me in its 

entirety.  I confirm that the documentation accurately reflects all work, 

treatment, procedures, and medical decision making performed by me.  





Attending Attestation





- Resident


Resident Name: Abdulaziz Bone





- ED Attending Attestation


I have performed the following: I have examined & evaluated the patient, The 

case was reviewed & discussed with the resident, I agree w/resident's findings 

& plan, Exceptions are as noted





- HPI


HPI: 





10/03/19 20:12


The patient is a 69 year old female with a past medical history of being 

nonverbal (s/p seizures last seizure 1 year ago), diabetes, HTN, dementia, 

chronic C. Diff, and lymphoma here today for evaluation s/p fall. Patient is 

nonverbal and history is provided by children. Daughter reports that she found 

the patient on the ground after what she believes was the patient rolling off 

of her couch. Daughter denies any seizure activity. She notes that the patient 

has had a worsening cough since saturday (9/2/19) and notes a fever of 100.2 

for which the patient was given advil yesterday. Daughter also notes that the 

patient fell one other time this week and describes it as the patient suddenly 

falling forward. Patients blood sugar was in the 200s prior to arrival.





Allergies: penicillins


PCP: Precious Ziegler








- Physicial Exam


PE: 





10/03/19 20:12





Constitutional: Awake, alert.  No acute distress.


Head:  Normocephalic.  Atraumatic


Eyes:  PERRL. EOMI.  Conjunctivae are not pale.


ENT:  Mucous membranes are moist and intact. Posterior pharynx without exudates 

or erythema. Uvula midline.


Neck:  Supple.  Full ROM. No lymphadenopathy.


Cardiovascular:  Regular rate.  Regular rhythm. S1, S2 regular.  Distal pulses 

are 2+ and symmetric.  


Pulmonary/Chest:  No evidence of respiratory distress.  Clear to auscultation 

bilaterally  No wheezing, rales or rhonchi.


Abdominal:  Soft and non-distended.  There is no tenderness.  No rebound, 

guarding or rigidity.  No organomegaly. No palpable masses. Good bowel sounds.


Back:  No CVA tenderness.


Musculoskeletal:  No edema.  No cyanosis.  No clubbing.  Full range of motion 

in all extremities.  Nocalf tenderness. Radial/pedal pulses are intact and 2+ 

bilaterally


Skin:  Skin is warm and dry.  No petechiae.  No purpura.  


Neurological:  +nonverbal, at baseline mental status. +tremor. +Negative 

Babinski lower extremities. Alert.  Cranial nerves II-XII are grossly intact. 

Strength is grossly symmetric. No sensory deficits.


Psychiatric:  Good eye contact.








- Medical Decision Making





10/03/19 20:35








I, Dr. Maria Esther Lam, DO, attest that this document has been prepared under 

my direction and personally reviewed by me in its entirety.   I further attest, 

that it accurately reflects all work, treatment, procedures and medical decision

-making performed by me.  





a/p: 68yo demented female with a fall off the sofa at home


-cared for by [facility] at home


-nonverbal


-rolled off the sofa


-no seizure activity


-has been coughing with low grade temp all week - tmax 100.2


-last motrin was yesterday


-no foul smell to urine


-eating and drinking normally - no n/v/d


-will send for head and c spine imaging given fall and nonverbal


-will obtain labs, cxr given cough and 2nd fall this week - apparently was 

standing, got weak this week and fell, no head injury


-will monitor and reassess


10/03/19 21:52


cxr clear


no elevated wbc


pt with UTI


10/03/19 23:02


chronic changes to head, no acute fx on c spine


will start abx


resident discussed findings with the [facility]


pt stable for dc to home on oral abx for uti





**Heart Score/ECG Review





- ECG Intrepretation


Comment:: 





10/03/19 20:42


sinus at 74, L axis, nl interval, lvh, no acute st/t wave findings, baseline 

artifact

## 2019-10-04 NOTE — EKG
Test Reason : 

Blood Pressure : ***/*** mmHG

Vent. Rate : 074 BPM     Atrial Rate : 074 BPM

   P-R Int : 174 ms          QRS Dur : 080 ms

    QT Int : 412 ms       P-R-T Axes : 014 -44 066 degrees

   QTc Int : 457 ms

 

*** POOR DATA QUALITY, INTERPRETATION MAY BE ADVERSELY AFFECTED

NORMAL SINUS RHYTHM

LEFT AXIS DEVIATION

MODERATE VOLTAGE CRITERIA FOR LVH, MAY BE NORMAL VARIANT

ABNORMAL ECG

 

Confirmed by RAYNA GONZALEZ, TIP ([id number]) on 10/4/2019 12:17:58 PM

 

Referred By:             Confirmed By:TIP WAY MD

## 2025-02-05 NOTE — PN
PA required for Repatha. Initiated through Epic.   Teaching Attending Note


Name of Resident: Marlo Sánchez





ATTENDING PHYSICIAN STATEMENT





I saw and evaluated the patient.


I reviewed the resident's note and discussed the case with the resident.


I agree with the resident's findings and plan as documented.








SUBJECTIVE:





No events over night 


OBJECTIVE:





NAD , non verbal


MMM


CV: RRR, no MRG


lungs: CTAB 


Ext: no edema 





ASSESSMENT AND PLAN:





69 y/o lady with h/o  dementia , non verbal, treated Non Hodgkin's lymphhoma , 

DM , HTN, recent hospitalization for ESBL klebsiella UTI, recent seizures who 

presented with fever . 





1- Sepsis due to UTI: cx with Klebsiella ESBL. 


- Cont ertapenam day 3/7 


-d/w ID 


- no mechanical obstruction , and no intervention 





2- DM : cont SSI and increase levemir 





3- H/o recent seizures. Cont keppra . 





4- HTN:  cont BB and increase  norvasc to 10.





5- DVT PX: heparin sq 





Dc is pending arrangements for home IV bx and PICC line